# Patient Record
Sex: FEMALE | Race: WHITE | NOT HISPANIC OR LATINO | Employment: FULL TIME | URBAN - METROPOLITAN AREA
[De-identification: names, ages, dates, MRNs, and addresses within clinical notes are randomized per-mention and may not be internally consistent; named-entity substitution may affect disease eponyms.]

---

## 2017-02-13 ENCOUNTER — PATIENT MESSAGE (OUTPATIENT)
Dept: OBSTETRICS AND GYNECOLOGY | Facility: CLINIC | Age: 27
End: 2017-02-13

## 2017-02-13 RX ORDER — NORETHINDRONE ACETATE AND ETHINYL ESTRADIOL .02; 1 MG/1; MG/1
1 TABLET ORAL DAILY
Qty: 84 TABLET | Refills: 1 | Status: SHIPPED | OUTPATIENT
Start: 2017-02-13 | End: 2017-05-09 | Stop reason: SDUPTHER

## 2017-04-25 ENCOUNTER — TELEPHONE (OUTPATIENT)
Dept: OPTOMETRY | Facility: CLINIC | Age: 27
End: 2017-04-25

## 2017-04-25 NOTE — TELEPHONE ENCOUNTER
Called to confirmed  their appointment for tomorrow at [facility] location, asked pt to bring a copy of their current ctl rx

## 2017-04-26 ENCOUNTER — OFFICE VISIT (OUTPATIENT)
Dept: OPTOMETRY | Facility: CLINIC | Age: 27
End: 2017-04-26
Payer: COMMERCIAL

## 2017-04-26 DIAGNOSIS — H52.13 MYOPIA, BILATERAL: ICD-10-CM

## 2017-04-26 DIAGNOSIS — H52.13 MYOPIA, BILATERAL: Primary | ICD-10-CM

## 2017-04-26 DIAGNOSIS — Z01.00 ROUTINE EYE EXAM: Primary | ICD-10-CM

## 2017-04-26 PROCEDURE — 92310 CONTACT LENS FITTING OU: CPT | Mod: ,,, | Performed by: OPTOMETRIST

## 2017-04-26 PROCEDURE — 92004 COMPRE OPH EXAM NEW PT 1/>: CPT | Mod: S$GLB,,, | Performed by: OPTOMETRIST

## 2017-04-26 PROCEDURE — 99499 UNLISTED E&M SERVICE: CPT | Mod: S$GLB,,, | Performed by: OPTOMETRIST

## 2017-04-26 PROCEDURE — 99999 PR PBB SHADOW E&M-EST. PATIENT-LVL II: CPT | Mod: PBBFAC,,, | Performed by: OPTOMETRIST

## 2017-04-26 PROCEDURE — 92015 DETERMINE REFRACTIVE STATE: CPT | Mod: S$GLB,,, | Performed by: OPTOMETRIST

## 2017-04-26 NOTE — PROGRESS NOTES
HPI     Patient's last dilated exam was: 1 1/2 years ago  Pt states: here for updated ctl and glasses rx. Patient denies flashes,   floaters, pain and double vision.   No other ocular complaints        Last edited by BOBBY Ojeda on 4/26/2017 10:34 AM.     ROS     Negative for: Constitutional, Gastrointestinal, Neurological, Skin,   Genitourinary, Musculoskeletal, HENT, Endocrine, Cardiovascular, Eyes,   Respiratory, Psychiatric, Allergic/Imm, Heme/Lymph    Last edited by Yola Siddiqui, OD on 4/26/2017  1:19 PM. (History)        Assessment /Plan     For exam results, see Encounter Report.    Routine eye exam    Myopia, bilateral            1-2.  Contact lens trials ordered.  Continue with current glasses.  Eye health normal OU.  RTC 1 week for cl dispense.

## 2017-05-02 ENCOUNTER — OFFICE VISIT (OUTPATIENT)
Dept: OPTOMETRY | Facility: CLINIC | Age: 27
End: 2017-05-02
Payer: COMMERCIAL

## 2017-05-02 DIAGNOSIS — H52.13 MYOPIA, BILATERAL: Primary | ICD-10-CM

## 2017-05-02 PROCEDURE — 92499 UNLISTED OPH SVC/PROCEDURE: CPT | Mod: ,,, | Performed by: OPTOMETRIST

## 2017-05-02 NOTE — PROGRESS NOTES
HPI     Contact Lens Follow Up    Additional comments: CL pickup           Comments   Last eye exam was 4/26/17 with Dr. Siddiqui.         Last edited by Phyllis Lima on 5/2/2017  3:54 PM. (History)        ROS     Negative for: Constitutional, Gastrointestinal, Neurological, Skin,   Genitourinary, Musculoskeletal, HENT, Endocrine, Cardiovascular, Eyes,   Respiratory, Psychiatric, Allergic/Imm, Heme/Lymph    Last edited by Yola Siddiqui, OD on 5/2/2017  4:25 PM. (History)        Assessment /Plan     For exam results, see Encounter Report.    Myopia, bilateral            1.  Biofinity and Oasys trials dispensed.  Ok for patient to call in preference to finalize contact lens rx.  RTC 1 year for routine exam.

## 2017-05-09 RX ORDER — NORETHINDRONE ACETATE AND ETHINYL ESTRADIOL .02; 1 MG/1; MG/1
1 TABLET ORAL DAILY
Qty: 84 TABLET | Refills: 4 | Status: SHIPPED | OUTPATIENT
Start: 2017-05-09 | End: 2017-11-15 | Stop reason: SDUPTHER

## 2017-09-15 ENCOUNTER — PATIENT MESSAGE (OUTPATIENT)
Dept: OPTOMETRY | Facility: CLINIC | Age: 27
End: 2017-09-15

## 2017-09-18 ENCOUNTER — PATIENT MESSAGE (OUTPATIENT)
Dept: OPTOMETRY | Facility: CLINIC | Age: 27
End: 2017-09-18

## 2017-11-15 RX ORDER — NORETHINDRONE ACETATE AND ETHINYL ESTRADIOL .02; 1 MG/1; MG/1
1 TABLET ORAL DAILY
Qty: 84 TABLET | Refills: 0 | Status: SHIPPED | OUTPATIENT
Start: 2017-11-15 | End: 2018-01-16 | Stop reason: SDUPTHER

## 2018-01-16 ENCOUNTER — OFFICE VISIT (OUTPATIENT)
Dept: OBSTETRICS AND GYNECOLOGY | Facility: CLINIC | Age: 28
End: 2018-01-16
Attending: OBSTETRICS & GYNECOLOGY
Payer: COMMERCIAL

## 2018-01-16 VITALS
SYSTOLIC BLOOD PRESSURE: 128 MMHG | BODY MASS INDEX: 27.1 KG/M2 | DIASTOLIC BLOOD PRESSURE: 80 MMHG | HEIGHT: 64 IN | WEIGHT: 158.75 LBS

## 2018-01-16 DIAGNOSIS — Z12.4 PAP SMEAR FOR CERVICAL CANCER SCREENING: ICD-10-CM

## 2018-01-16 DIAGNOSIS — Z30.41 ENCOUNTER FOR SURVEILLANCE OF CONTRACEPTIVE PILLS: ICD-10-CM

## 2018-01-16 DIAGNOSIS — Z01.419 ENCOUNTER FOR GYNECOLOGICAL EXAMINATION WITHOUT ABNORMAL FINDING: Primary | ICD-10-CM

## 2018-01-16 PROCEDURE — 88175 CYTOPATH C/V AUTO FLUID REDO: CPT

## 2018-01-16 PROCEDURE — 99395 PREV VISIT EST AGE 18-39: CPT | Mod: S$GLB,,, | Performed by: OBSTETRICS & GYNECOLOGY

## 2018-01-16 PROCEDURE — 99999 PR PBB SHADOW E&M-EST. PATIENT-LVL III: CPT | Mod: PBBFAC,,, | Performed by: OBSTETRICS & GYNECOLOGY

## 2018-01-16 RX ORDER — NORETHINDRONE ACETATE AND ETHINYL ESTRADIOL .02; 1 MG/1; MG/1
1 TABLET ORAL DAILY
Qty: 84 TABLET | Refills: 3 | Status: SHIPPED | OUTPATIENT
Start: 2018-01-16 | End: 2019-01-09 | Stop reason: SDUPTHER

## 2018-01-16 NOTE — PROGRESS NOTES
Subjective:       Patient ID: Roxanne Hardy is a 27 y.o. female.    Chief Complaint:  Gynecologic Exam      History of Present Illness  HPI    Roxanne Hardy is a 27 y.o. female  NEW TO ME here for her annual GYN exam.    She describes her periods as regular, normal, light flow, lasting 4 days. (lighter since on OCP's)  denies break through bleeding.   denies vaginal itching or irritation.  Denies vaginal discharge.  She is sexually active. She has had 1 partner for the past 2 years .  She uses oral contraceptives (estrogen/progesterone) for contraception.   History of abnormal pap: No  Last Pap: approximate date  and was normal  denies domestic violence. She does feel safe at home.     Past Medical History:   Diagnosis Date    Bee sting allergy     Chronic idiopathic urticaria      History reviewed. No pertinent surgical history.  Social History     Social History    Marital status: Single     Spouse name: N/A    Number of children: N/A    Years of education: N/A     Occupational History    Not on file.     Social History Main Topics    Smoking status: Never Smoker    Smokeless tobacco: Never Used    Alcohol use 2.4 oz/week     4 Glasses of wine per week    Drug use: No    Sexual activity: Yes     Partners: Male     Birth control/ protection: OCP      Comment: Partner [id number]     Other Topics Concern    Not on file     Social History Narrative    No narrative on file     Family History   Problem Relation Age of Onset    Hyperlipidemia Mother     Hypothyroidism Mother     Hyperlipidemia Father     Hypertension Father     Ovarian cancer Maternal Grandmother 70    Skin cancer Paternal Grandmother     Diabetes Paternal Grandmother     Prostate cancer Paternal Grandfather     Pancreatic cancer Paternal Grandfather 83    Breast cancer Neg Hx     Colon cancer Neg Hx     Stroke Neg Hx      OB History      Para Term  AB Living    0 0 0 0 0 0    SAB TAB Ectopic Multiple Live  "Births    0 0 0 0            /80   Ht 5' 4" (1.626 m)   Wt 72 kg (158 lb 11.7 oz)   LMP 01/10/2018   BMI 27.25 kg/m²         GYN & OB History  Patient's last menstrual period was 01/10/2018.   Date of Last Pap: No result found    OB History    Para Term  AB Living   0 0 0 0 0 0   SAB TAB Ectopic Multiple Live Births   0 0 0 0               Review of Systems  Review of Systems   Constitutional: Negative for activity change, appetite change, fatigue and unexpected weight change.   HENT: Negative.    Eyes: Negative for visual disturbance.   Respiratory: Negative for shortness of breath and wheezing.    Cardiovascular: Negative for chest pain, palpitations and leg swelling.   Gastrointestinal: Negative for abdominal pain, bloating and blood in stool.   Endocrine: Negative for diabetes and hair loss.   Genitourinary: Negative for decreased libido, dyspareunia and menorrhagia.   Musculoskeletal: Negative for back pain and joint swelling.   Skin:  Negative for no acne and hair changes.   Neurological: Negative for headaches.   Hematological: Does not bruise/bleed easily.   Psychiatric/Behavioral: Negative for depression and sleep disturbance. The patient is not nervous/anxious.    Breast: Negative for breast pain and nipple discharge          Objective:    Physical Exam:   Constitutional: She is oriented to person, place, and time. She appears well-developed and well-nourished.    HENT:   Head: Normocephalic and atraumatic.    Eyes: EOM are normal. Pupils are equal, round, and reactive to light.    Neck: Normal range of motion. Neck supple.    Cardiovascular: Normal rate and regular rhythm.     Pulmonary/Chest: Effort normal and breath sounds normal.   BREASTS: Symmetrical, no skin changes or visible lesions.  No palpable masses, nipple discharge bilaterally.            Abdominal: Soft. Bowel sounds are normal.     Genitourinary: Pelvic exam was performed with patient supine.   Genitourinary " Comments: PELVIC: Normal external genitalia without lesions.  Normal hair distribution.  Adequate perineal body, normal urethral meatus.  Vagina moist and well rugated without lesions or discharge.  Cervix pink, with ectropion, without lesions, discharge or tenderness.  No significant cystocele or rectocele.  Bimanual exam shows uterus to be normal size, regular, mobile and nontender.  Adnexa without masses or tenderness.               Musculoskeletal: Normal range of motion and moves all extremeties.       Neurological: She is alert and oriented to person, place, and time.    Skin: Skin is warm and dry.    Psychiatric: She has a normal mood and affect.          Assessment:        1. Encounter for gynecological examination without abnormal finding    2. Pap smear for cervical cancer screening    3. Encounter for surveillance of contraceptive pills               Plan:        1. Encounter for gynecological examination without abnormal finding  COUNSELING:  The patient was counseled today on osteoporosis prevention, calcium supplementation, and regular weight bearing exercise. The patient was also counseled today on ACS PAP guidelines, with recommendations for yearly pelvic exams unless their uterus, cervix, and ovaries were removed for benign reasons; in that case, examinations every 3-5 years are recommended. The patient was also counseled regarding monthly breast self-examination, routine STD screening for at-risk populations, prophylactic immunizations for transmitted infections such as HPV, Pertussis, or Influenza as appropriate, and yearly mammograms when indicated by ACS guidelines. She was advised to see her primary care physician for all other health maintenance.   FOLLOW-UP with me for next routine visit.         2. Pap smear for cervical cancer screening    - Liquid-based pap smear, screening    3. Encounter for surveillance of contraceptive pills    - norethindrone-ethinyl estradiol (MICROGESTIN 1/20) 1-20  mg-mcg per tablet; Take 1 tablet by mouth once daily.  Dispense: 84 tablet; Refill: 3       Follow-up in about 1 year (around 1/16/2019).

## 2018-02-06 ENCOUNTER — TELEPHONE (OUTPATIENT)
Dept: OBSTETRICS AND GYNECOLOGY | Facility: CLINIC | Age: 28
End: 2018-02-06

## 2018-02-06 NOTE — TELEPHONE ENCOUNTER
Called to discuss pt's request for a refill of Microgestin 1/20. Pt already has a prescription that will last her through to 1/16/2019 with the [facility]. No answer. Left message requesting call back at [phone].

## 2018-03-26 ENCOUNTER — OFFICE VISIT (OUTPATIENT)
Dept: INTERNAL MEDICINE | Facility: CLINIC | Age: 28
End: 2018-03-26
Payer: COMMERCIAL

## 2018-03-26 VITALS
HEART RATE: 83 BPM | DIASTOLIC BLOOD PRESSURE: 70 MMHG | BODY MASS INDEX: 27.15 KG/M2 | HEIGHT: 65 IN | SYSTOLIC BLOOD PRESSURE: 100 MMHG | WEIGHT: 162.94 LBS | OXYGEN SATURATION: 99 %

## 2018-03-26 DIAGNOSIS — Z00.00 ANNUAL PHYSICAL EXAM: Primary | ICD-10-CM

## 2018-03-26 DIAGNOSIS — Z91.030 BEE STING ALLERGY: ICD-10-CM

## 2018-03-26 PROCEDURE — 99395 PREV VISIT EST AGE 18-39: CPT | Mod: S$GLB,,, | Performed by: NURSE PRACTITIONER

## 2018-03-26 PROCEDURE — 1158F ADVNC CARE PLAN TLK DOCD: CPT | Mod: S$GLB,,, | Performed by: NURSE PRACTITIONER

## 2018-03-26 PROCEDURE — 99999 PR PBB SHADOW E&M-EST. PATIENT-LVL III: CPT | Mod: PBBFAC,,, | Performed by: NURSE PRACTITIONER

## 2018-03-26 RX ORDER — EPINEPHRINE 0.3 MG/.3ML
1 INJECTION SUBCUTANEOUS ONCE AS NEEDED
Qty: 0.3 ML | Refills: 3 | Status: SHIPPED | OUTPATIENT
Start: 2018-03-26 | End: 2020-05-26 | Stop reason: SDUPTHER

## 2018-03-26 NOTE — PROGRESS NOTES
"Subjective:       Patient ID: Roxanne Hardy is a 27 y.o. female.    Chief Complaint: Annual Exam    Pt here for annual exam, no complaints  Gets annual eye exam, wears contacts and glasses  Works NICU RN at [employer]  Goes to dentist on regular basis  Attempts to exercise  Had flu vaccine through employee health this year  Had a tetanus prior to moving to  in 2011  Wears seatbelts  Feels safe at home      Past Medical History:   Diagnosis Date    Bee sting allergy     Chronic idiopathic urticaria      History reviewed. No pertinent surgical history.  Social History     Social History Narrative    No narrative on file     Family History   Problem Relation Age of Onset    Hyperlipidemia Mother     Hypothyroidism Mother     Hyperlipidemia Father     Hypertension Father     Ovarian cancer Maternal Grandmother 70    Skin cancer Paternal Grandmother     Diabetes Paternal Grandmother     Prostate cancer Paternal Grandfather     Pancreatic cancer Paternal Grandfather 83    Breast cancer Neg Hx     Colon cancer Neg Hx     Stroke Neg Hx      Vitals:    03/26/18 1433   BP: 100/70   Pulse: 83   SpO2: 99%   Weight: 73.9 kg (162 lb 14.7 oz)   Height: 5' 5" (1.651 m)   PainSc: 0-No pain     Outpatient Encounter Prescriptions as of 3/26/2018   Medication Sig Dispense Refill    norethindrone-ethinyl estradiol (MICROGESTIN 1/20) 1-20 mg-mcg per tablet Take 1 tablet by mouth once daily. 84 tablet 3    EPINEPHrine (EPIPEN 2-BUCK) 0.3 mg/0.3 mL AtIn Inject 0.3 mLs (0.3 mg total) into the muscle once as needed (anaphylaxis). 0.3 mL 3    [DISCONTINUED] epinephrine (EPIPEN 2-BUCK) 0.3 mg/0.3 mL (1:1,000) AtIn Inject 0.3 mLs (0.3 mg total) into the muscle once as needed (anaphylaxis). 0.3 mL 0     No facility-administered encounter medications on file as of 3/26/2018.      Wt Readings from Last 3 Encounters:   03/26/18 73.9 kg (162 lb 14.7 oz)   01/16/18 72 kg (158 lb 11.7 oz)   11/30/16 67.5 kg (148 lb 13 oz)     Last 3 sets " "of Vitals    Vitals - 1 value per visit 5/2/2017 1/16/2018 3/26/2018   SYSTOLIC - 128 100   DIASTOLIC - 80 70   PULSE - - 83   TEMPERATURE - - -   SPO2 - - 99   Weight (lb) - 158.73 162.92   Weight (kg) - 72 73.9   HEIGHT - 5' 4" 5' 5"   BODY MASS INDEX - 27.25 27.11   VISIT REPORT - - -   Pain Score  0 0 0     No results found for: CBC  Review of Systems   Constitutional: Negative for activity change, appetite change, chills and unexpected weight change.   HENT: Negative for congestion and trouble swallowing.    Respiratory: Negative for cough.    Cardiovascular: Negative for chest pain.   Gastrointestinal: Negative for abdominal pain, diarrhea and nausea.   Genitourinary: Negative for difficulty urinating.   Musculoskeletal: Negative for arthralgias and myalgias.   Skin: Negative for rash.   Hematological: Negative for adenopathy.   Psychiatric/Behavioral: Negative for sleep disturbance.       Objective:      Physical Exam   Constitutional: She is oriented to person, place, and time. She appears well-developed and well-nourished. No distress.   HENT:   Head: Normocephalic and atraumatic.   Right Ear: External ear normal.   Left Ear: External ear normal.   Nose: Nose normal.   Mouth/Throat: Oropharynx is clear and moist. No oropharyngeal exudate.   Eyes: Conjunctivae and EOM are normal. Pupils are equal, round, and reactive to light. Right eye exhibits no discharge. No scleral icterus.   Neck: Normal range of motion. Neck supple. No JVD present. No thyromegaly present.   Cardiovascular: Normal rate, regular rhythm, normal heart sounds and intact distal pulses.    No murmur heard.  Pulmonary/Chest: Effort normal and breath sounds normal. No respiratory distress. She has no wheezes.   Abdominal: Soft. Bowel sounds are normal. She exhibits no distension and no mass. There is no tenderness.   Musculoskeletal: Normal range of motion. She exhibits no edema or tenderness.   Lymphadenopathy:     She has no cervical " adenopathy.   Neurological: She is alert and oriented to person, place, and time. She has normal reflexes. No cranial nerve deficit. She exhibits normal muscle tone.   Skin: Skin is warm and dry. No rash noted. She is not diaphoretic. No erythema.   Psychiatric: She has a normal mood and affect. Her behavior is normal. Judgment and thought content normal.   Nursing note and vitals reviewed.      Assessment:       1. Annual physical exam    2. Bee sting allergy        Plan:       Patient Counseling:  --Nutrition: Stressed importance of moderation in sodium/caffeine intake, saturated fat and cholesterol, caloric balance, sufficient intake of fresh fruits, vegetables, fiber, calcium, iron, and 1 mg of folate supplement per day (for females capable of pregnancy).  --Exercise: Stressed the importance of regular exercise.   --Substance Abuse: Discussed cessation/primary prevention of tobacco, alcohol, or other drug use; driving or other dangerous activities under the influence; availability of treatment for abuse.   --Sexuality: Discussed sexually transmitted diseases, partner selection, use of condoms, avoidance of unintended pregnancy and contraceptive alternatives.   --Injury prevention: Discussed safety belts, safety helmets, smoke detector.  --Dental health: Discussed importance of regular tooth brushing, flossing, and dental visits.  --Immunizations reviewed.  Roxanne was seen today for annual exam.    Diagnoses and all orders for this visit:    Annual physical exam  -     CBC auto differential; Future  -     Comprehensive metabolic panel; Future  -     Lipid panel; Future    Bee sting allergy  -     EPINEPHrine (EPIPEN 2-BUCK) 0.3 mg/0.3 mL AtIn; Inject 0.3 mLs (0.3 mg total) into the muscle once as needed (anaphylaxis).      Patient Instructions       Prevention Guidelines, Women Ages 18 to 39  Screening tests and vaccines are an important part of managing your health. Health counseling is essential, too. Below are  guidelines for these, for women ages 18 to 39. Talk with your healthcare provider to make sure youre up-to-date on what you need.  Screening Who needs it How often   Alcohol misuse All women in this age group At routine exams   Blood pressure All women in this age group Every 2 years if your blood pressure is less than 120/80 mm Hg; yearly if your systolic blood pressure is 120 to 139 mm Hg, or your diastolic blood pressure reading is 80 to 89 mm Hg   Breast cancer All women in this age group should talk with their healthcare providers about the need for clinical breast exams (CBE)1 Clinical breast exam every 3 years1   Cervical cancer Women ages 21 and older Women between ages 21 and 29 should have a Pap test every 3 years; women between ages 30 and 65 are advised to have a Pap test plus an HPV test every 5 years   Chlamydia Sexually active women ages 24 and younger, and women at increased risk for infection Every 3 years if you're at risk or have symptoms   Depression All women in this age group At routine exams   Diabetes mellitus, type 2 Adults with no symptoms who are overweight or obese and have 1 or more other risk factors for diabetes At least every 3 years   Gonorrhea Sexually active women at increased risk for infection At routine exams   Hepatitis C Anyone at increased risk At routine exams   HIV All women At routine exams   Obesity All women in this age group At routine exams   Syphilis Women at increased risk for infection - talk with your healthcare provider At routine exams   Tuberculosis Women at increased risk for infection - talk with your healthcare provider Ask your healthcare provider   Vision All women in this age group At least 1 complete exam in your 20s, and 2 in your 30s   Vaccine2 Who needs it How often   Chickenpox (varicella) All women in this age group who have no record of this infection or vaccine 2 doses; the second dose should be given 4 to 8 weeks after the first dose   Hepatitis  A Women at increased risk for infection - talk with your healthcare provider 2 doses given at least 6 months apart   Hepatitis B Women at increased risk for infection - talk with your healthcare provider 3 doses over 6 months; second dose should be given 1 month after the first dose; the third dose should be given at least 2 months after the second dose and at least 4 months after the first dose   Haemophilus influenzae Type B (HIB) Women at increased risk for infection - talk with your healthcare provider 1 to 3 doses   Human papillomavirus (HPV) All women in this age group up to age 26 3 doses; the second dose should be given 1 to 2 months after the first dose and the third dose given 6 months after the first dose   Influenza (flu) All women in this age group Once a year   Measles, mumps, rubella (MMR) All women in this age group who have no record of these infections or vaccines 1 or 2 doses   Meningococcal Women at increased risk for infection - talk with your healthcare provider 1 or more doses   Pneumococcal conjugate vaccine (PCV13) and pneumococcal polysaccharide vaccine (PPSV23) Women at increased risk for infection - talk with your healthcare provider PCV13: 1 dose ages 19 to 65 (protects against 13 types of pneumococcal bacteria)  PPSV23: 1 to 2 doses through age 64, or 1 dose at 65 or older (protects against 23 types of pneumococcal bacteria)      Tetanus/diphtheria/pertussis (Td/Tdap) booster All women in this age group Td every 10 years, or a one-time dose of Tdap instead of a Td booster after age 18, then Td every 10 years   Counseling Who needs it How often   BRCA gene mutation testing for breast and ovarian cancer susceptibility Women with increased risk for having gene mutation When your risk is known   Breast cancer and chemoprevention Women at high risk for breast cancer When your risk is known   Diet and exercise Women who are overweight or obese When diagnosed, and then at routine exams    Domestic violence Women at the age in which they are able to have children At routine exams   Sexually transmitted infection prevention Women who are sexually active At routine exams   Skin cancer Prevention of skin cancer in fair-skinned adults through age 24 At routine exams   Use of tobacco and the health effects it can cause All women in this age group Every visit   1According to the ACS, women ages 20 to 39 years should have a clinical breast exam (CBE) as part of their routine health exam every 3 years. Breast self-exams are an option for women starting in their 20s. But the  USPSTF does not recommend CBE.  2Those who are 18 years old and not up-to-date on their childhood vaccines should get all appropriate catch-up vaccines recommended by the CDC.  Date Last Reviewed: 8/27/2015  © 9283-6163 [employer]. [address] [place] [address], [place] [address]. All rights reserved. This information is not intended as a substitute for professional medical care. Always follow your healthcare professional's instructions.

## 2018-03-26 NOTE — PATIENT INSTRUCTIONS
Prevention Guidelines, Women Ages 18 to 39  Screening tests and vaccines are an important part of managing your health. Health counseling is essential, too. Below are guidelines for these, for women ages 18 to 39. Talk with your healthcare provider to make sure youre up-to-date on what you need.  Screening Who needs it How often   Alcohol misuse All women in this age group At routine exams   Blood pressure All women in this age group Every 2 years if your blood pressure is less than 120/80 mm Hg; yearly if your systolic blood pressure is 120 to 139 mm Hg, or your diastolic blood pressure reading is 80 to 89 mm Hg   Breast cancer All women in this age group should talk with their healthcare providers about the need for clinical breast exams (CBE)1 Clinical breast exam every 3 years1   Cervical cancer Women ages 21 and older Women between ages 21 and 29 should have a Pap test every 3 years; women between ages 30 and 65 are advised to have a Pap test plus an HPV test every 5 years   Chlamydia Sexually active women ages 24 and younger, and women at increased risk for infection Every 3 years if you're at risk or have symptoms   Depression All women in this age group At routine exams   Diabetes mellitus, type 2 Adults with no symptoms who are overweight or obese and have 1 or more other risk factors for diabetes At least every 3 years   Gonorrhea Sexually active women at increased risk for infection At routine exams   Hepatitis C Anyone at increased risk At routine exams   HIV All women At routine exams   Obesity All women in this age group At routine exams   Syphilis Women at increased risk for infection - talk with your healthcare provider At routine exams   Tuberculosis Women at increased risk for infection - talk with your healthcare provider Ask your healthcare provider   Vision All women in this age group At least 1 complete exam in your 20s, and 2 in your 30s   Vaccine2 Who needs it How often   Chickenpox  (varicella) All women in this age group who have no record of this infection or vaccine 2 doses; the second dose should be given 4 to 8 weeks after the first dose   Hepatitis A Women at increased risk for infection - talk with your healthcare provider 2 doses given at least 6 months apart   Hepatitis B Women at increased risk for infection - talk with your healthcare provider 3 doses over 6 months; second dose should be given 1 month after the first dose; the third dose should be given at least 2 months after the second dose and at least 4 months after the first dose   Haemophilus influenzae Type B (HIB) Women at increased risk for infection - talk with your healthcare provider 1 to 3 doses   Human papillomavirus (HPV) All women in this age group up to age 26 3 doses; the second dose should be given 1 to 2 months after the first dose and the third dose given 6 months after the first dose   Influenza (flu) All women in this age group Once a year   Measles, mumps, rubella (MMR) All women in this age group who have no record of these infections or vaccines 1 or 2 doses   Meningococcal Women at increased risk for infection - talk with your healthcare provider 1 or more doses   Pneumococcal conjugate vaccine (PCV13) and pneumococcal polysaccharide vaccine (PPSV23) Women at increased risk for infection - talk with your healthcare provider PCV13: 1 dose ages 19 to 65 (protects against 13 types of pneumococcal bacteria)  PPSV23: 1 to 2 doses through age 64, or 1 dose at 65 or older (protects against 23 types of pneumococcal bacteria)      Tetanus/diphtheria/pertussis (Td/Tdap) booster All women in this age group Td every 10 years, or a one-time dose of Tdap instead of a Td booster after age 18, then Td every 10 years   Counseling Who needs it How often   BRCA gene mutation testing for breast and ovarian cancer susceptibility Women with increased risk for having gene mutation When your risk is known   Breast cancer and  chemoprevention Women at high risk for breast cancer When your risk is known   Diet and exercise Women who are overweight or obese When diagnosed, and then at routine exams   Domestic violence Women at the age in which they are able to have children At routine exams   Sexually transmitted infection prevention Women who are sexually active At routine exams   Skin cancer Prevention of skin cancer in fair-skinned adults through age 24 At routine exams   Use of tobacco and the health effects it can cause All women in this age group Every visit   1According to the ACS, women ages 20 to 39 years should have a clinical breast exam (CBE) as part of their routine health exam every 3 years. Breast self-exams are an option for women starting in their 20s. But the  USPSTF does not recommend CBE.  2Those who are 18 years old and not up-to-date on their childhood vaccines should get all appropriate catch-up vaccines recommended by the CDC.  Date Last Reviewed: 8/27/2015  © 0469-5252 [employer]. [address] [place] [address], [place] [address]. All rights reserved. This information is not intended as a substitute for professional medical care. Always follow your healthcare professional's instructions.

## 2018-05-15 ENCOUNTER — PATIENT MESSAGE (OUTPATIENT)
Dept: INTERNAL MEDICINE | Facility: CLINIC | Age: 28
End: 2018-05-15

## 2018-06-18 ENCOUNTER — TELEPHONE (OUTPATIENT)
Dept: PLASTIC SURGERY | Facility: CLINIC | Age: 28
End: 2018-06-18

## 2018-07-11 ENCOUNTER — OFFICE VISIT (OUTPATIENT)
Dept: PLASTIC SURGERY | Facility: CLINIC | Age: 28
End: 2018-07-11
Payer: COMMERCIAL

## 2018-07-11 VITALS
HEIGHT: 65 IN | RESPIRATION RATE: 18 BRPM | HEART RATE: 110 BPM | TEMPERATURE: 99 F | DIASTOLIC BLOOD PRESSURE: 86 MMHG | SYSTOLIC BLOOD PRESSURE: 121 MMHG | BODY MASS INDEX: 28.17 KG/M2 | WEIGHT: 169.06 LBS

## 2018-07-11 DIAGNOSIS — G89.29 CHRONIC NECK PAIN: ICD-10-CM

## 2018-07-11 DIAGNOSIS — G89.29 CHRONIC BILATERAL LOW BACK PAIN WITHOUT SCIATICA: ICD-10-CM

## 2018-07-11 DIAGNOSIS — R21 EXCORIATED RASH: ICD-10-CM

## 2018-07-11 DIAGNOSIS — M54.2 CHRONIC NECK PAIN: ICD-10-CM

## 2018-07-11 DIAGNOSIS — M54.50 CHRONIC BILATERAL LOW BACK PAIN WITHOUT SCIATICA: ICD-10-CM

## 2018-07-11 DIAGNOSIS — N62 MACROMASTIA: Primary | ICD-10-CM

## 2018-07-11 PROCEDURE — 99999 PR PBB SHADOW E&M-EST. PATIENT-LVL III: CPT | Mod: PBBFAC,,, | Performed by: SURGERY

## 2018-07-11 PROCEDURE — 3008F BODY MASS INDEX DOCD: CPT | Mod: CPTII,S$GLB,, | Performed by: SURGERY

## 2018-07-11 PROCEDURE — 99204 OFFICE O/P NEW MOD 45 MIN: CPT | Mod: S$GLB,,, | Performed by: SURGERY

## 2018-07-11 NOTE — LETTER
[facility]  [address]  [facility] [address]  Phone: [phone]  Fax: [phone] July 19, 2018      Vinny Benton IV, MD  [address]  [facility] [address]    Patient: Roxanne Hardy   MR Number: [id number]   YOB: 1990   Date of Visit: 7/11/2018     Dear Dr. Benton:    Thank you for referring Roxanne Hardy to me for evaluation. Below are the relevant portions of my assessment and plan of care.    ASSESSMENT/PLAN:      1.Symptomatic Bilateral Macromastia  2. Chronic neck pain  3. Chronic back pain  4. Chronic breast rashes     PLAN:   -Will need 1200 gm reduction per side  -Will submit paper work for insurance approval  -Photos ordered  -Risk, benefits, and alternatives explained.    If you have questions, please do not hesitate to call me. I look forward to following Roxanne along with you.    Sincerely,    Asa Mak M.D.  Section of Plastic Surgery  [facility]    MAY/juice

## 2018-07-11 NOTE — LETTER
July 12, 2018      Vinny Benton IV, MD  [address]  [facility] [address]           [facility]  [address]  [facility] [address]  Phone: [phone]  Fax: [phone]          Patient: Roxanne Hardy   MR Number: [id number]   YOB: 1990   Date of Visit: 7/11/2018       Dear Dr. Vinny Benton IV:    Thank you for referring Roxanne Hardy to me for evaluation. Attached you will find relevant portions of my assessment and plan of care.    If you have questions, please do not hesitate to call me. I look forward to following Roxanne Hardy along with you.    Sincerely,    Asa Mak MD    Enclosure  CC:  No Recipients    If you would like to receive this communication electronically, please contact [email or web address] or [phone] to request more information on [employer] Link access.    For providers and/or their staff who would like to refer a patient to [facility], please contact us through our one-stop-shop provider referral line, [facility], at [phone].    If you feel you have received this communication in error or would no longer like to receive these types of communications, please e-mail [email or web address]

## 2018-08-03 DIAGNOSIS — M54.5 CHRONIC LOW BACK PAIN, UNSPECIFIED BACK PAIN LATERALITY, WITH SCIATICA PRESENCE UNSPECIFIED: ICD-10-CM

## 2018-08-03 DIAGNOSIS — M54.5 LOW BACK PAIN, UNSPECIFIED BACK PAIN LATERALITY, UNSPECIFIED CHRONICITY, WITH SCIATICA PRESENCE UNSPECIFIED: ICD-10-CM

## 2018-08-03 DIAGNOSIS — R21 EXCORIATED RASH: ICD-10-CM

## 2018-08-03 DIAGNOSIS — M54.2 NECK PAIN: ICD-10-CM

## 2018-08-03 DIAGNOSIS — N62 MACROMASTIA: Primary | ICD-10-CM

## 2018-08-03 DIAGNOSIS — G89.29 CHRONIC LOW BACK PAIN, UNSPECIFIED BACK PAIN LATERALITY, WITH SCIATICA PRESENCE UNSPECIFIED: ICD-10-CM

## 2018-08-29 ENCOUNTER — TELEPHONE (OUTPATIENT)
Dept: PLASTIC SURGERY | Facility: CLINIC | Age: 28
End: 2018-08-29

## 2018-08-29 NOTE — TELEPHONE ENCOUNTER
----- Message from Dipesh Dhillon sent at 8/29/2018  1:38 PM CDT -----  Pt called stating she need a letter stating that her surgery date has been rescheduled.    She says it has to state the original surgery date and the date its been reschedule to.    Call [phone]

## 2018-08-30 ENCOUNTER — PATIENT MESSAGE (OUTPATIENT)
Dept: SURGERY | Facility: CLINIC | Age: 28
End: 2018-08-30

## 2018-09-18 ENCOUNTER — ANESTHESIA EVENT (OUTPATIENT)
Dept: SURGERY | Facility: HOSPITAL | Age: 28
End: 2018-09-18
Payer: COMMERCIAL

## 2018-10-01 ENCOUNTER — OFFICE VISIT (OUTPATIENT)
Dept: DERMATOLOGY | Facility: CLINIC | Age: 28
End: 2018-10-01
Payer: COMMERCIAL

## 2018-10-01 DIAGNOSIS — D48.5 NEOPLASM OF UNCERTAIN BEHAVIOR OF SKIN: Primary | ICD-10-CM

## 2018-10-01 PROCEDURE — 11100 PR BIOPSY OF SKIN LESION: CPT | Mod: S$GLB,,, | Performed by: DERMATOLOGY

## 2018-10-01 PROCEDURE — 88304 TISSUE EXAM BY PATHOLOGIST: CPT | Performed by: PATHOLOGY

## 2018-10-01 PROCEDURE — 99499 UNLISTED E&M SERVICE: CPT | Mod: S$GLB,,, | Performed by: DERMATOLOGY

## 2018-10-01 PROCEDURE — 99999 PR PBB SHADOW E&M-EST. PATIENT-LVL III: CPT | Mod: PBBFAC,,, | Performed by: DERMATOLOGY

## 2018-10-01 PROCEDURE — 88304 TISSUE EXAM BY PATHOLOGIST: CPT | Mod: 26,,, | Performed by: PATHOLOGY

## 2018-10-01 NOTE — PROGRESS NOTES
Subjective:       Patient ID:  Roxanne Hardy is a 28 y.o. female who presents for   Chief Complaint   Patient presents with    Lesion     Pt c/o lesion on right cheek x a few years. Increased in size. No bleeding, pain . Was bx over 2 years ago and was a cyst but lesion has recurred.       Lesion         Review of Systems   Skin: Negative for tendency to form keloidal scars.   Hematologic/Lymphatic: Does not bruise/bleed easily.        Objective:    Physical Exam   Constitutional: She appears well-developed and well-nourished. No distress.   Neurological: She is alert and oriented to person, place, and time. She is not disoriented.   Psychiatric: She has a normal mood and affect.   Skin:   Areas Examined (abnormalities noted in diagram):   Head / Face Inspection Performed              Diagram Legend     Erythematous scaling macule/papule c/w actinic keratosis       Vascular papule c/w angioma      Pigmented verrucoid papule/plaque c/w seborrheic keratosis      Yellow umbilicated papule c/w sebaceous hyperplasia      Irregularly shaped tan macule c/w lentigo     1-2 mm smooth white papules consistent with Milia      Movable subcutaneous cyst with punctum c/w epidermal inclusion cyst      Subcutaneous movable cyst c/w pilar cyst      Firm pink to brown papule c/w dermatofibroma      Pedunculated fleshy papule(s) c/w skin tag(s)      Evenly pigmented macule c/w junctional nevus     Mildly variegated pigmented, slightly irregular-bordered macule c/w mildly atypical nevus      Flesh colored to evenly pigmented papule c/w intradermal nevus       Pink pearly papule/plaque c/w basal cell carcinoma      Erythematous hyperkeratotic cursted plaque c/w SCC      Surgical scar with no sign of skin cancer recurrence      Open and closed comedones      Inflammatory papules and pustules      Verrucoid papule consistent consistent with wart     Erythematous eczematous patches and plaques     Dystrophic onycholytic nail with subungual  debris c/w onychomycosis     Umbilicated papule    Erythematous-base heme-crusted tan verrucoid plaque consistent with inflamed seborrheic keratosis     Erythematous Silvery Scaling Plaque c/w Psoriasis     See annotation      Assessment / Plan:      Pathology Orders:     Normal Orders This Visit    Tissue Specimen To Pathology, Dermatology     Questions:    Directional Terms:  Other(comment)    Clinical information:  r/o cyst    Specific Site:  right lateral cheek        Neoplasm of uncertain behavior of skin  Punch biopsy procedure note:  Punch biopsy performed after verbal consent obtained. Area marked and prepped with alcohol. Approximately 1cc of 1% lidocaine with epinephrine injected. 6 mm disposable punch used to remove lesion. Hemostasis obtained and biopsy site closed with 1 - 2 Prolene sutures. Wound care instructions reviewed with patient and handout given.    -     Tissue Specimen To Pathology, Dermatology             Follow-up in about 1 week (around 10/8/2018).

## 2018-10-08 ENCOUNTER — CLINICAL SUPPORT (OUTPATIENT)
Dept: DERMATOLOGY | Facility: CLINIC | Age: 28
End: 2018-10-08
Payer: COMMERCIAL

## 2018-10-08 DIAGNOSIS — Z48.02 VISIT FOR SUTURE REMOVAL: Primary | ICD-10-CM

## 2018-10-08 PROCEDURE — 99999 PR PBB SHADOW E&M-EST. PATIENT-LVL I: CPT | Mod: PBBFAC,,,

## 2018-10-08 NOTE — PROGRESS NOTES
Patient presents for suture removal. The wound is well healed without signs of infection.  The sutures are removed. Wound care and activity instructions given. Return prn.

## 2018-10-11 ENCOUNTER — TELEPHONE (OUTPATIENT)
Dept: PLASTIC SURGERY | Facility: CLINIC | Age: 28
End: 2018-10-11

## 2018-10-11 NOTE — TELEPHONE ENCOUNTER
Pt came in for pre op appointment.  Her allergies and medication list was reviewed. I verified with her that a ride needed to be arranged and where to report on day of surgery.  We went over education of pre op/post op instruction as well as LEFTY drain use and function.  All her questions were answered and contact information of the PLS office was given to her should new questions arise.

## 2018-10-19 ENCOUNTER — TELEPHONE (OUTPATIENT)
Dept: PREADMISSION TESTING | Facility: HOSPITAL | Age: 28
End: 2018-10-19

## 2018-10-19 DIAGNOSIS — Z01.818 PREOPERATIVE TESTING: Primary | ICD-10-CM

## 2018-10-19 NOTE — PRE ADMISSION SCREENING
Anesthesia Assessment: Preoperative EQUATION    Planned Procedure: Procedure(s) (LRB):  MAMMOPLASTY, REDUCTION, BILATERAL (Bilateral)  Requested Anesthesia Type:General  Surgeon: Asa Mak MD  Service: Plastics  Known or anticipated Date of Surgery:11/1/2018    Surgeon notes: reviewed    Electronic QUestionnaire Assessment completed via nurse interview with patient.        NO AQ        Triage considerations:     The patient has no apparent active cardiac condition (No unstable coronary Syndrome such as severe unstable angina or recent [<1 month] myocardial infarction, decompensated CHF, severe valvular   disease or significant arrhythmia)    Previous anesthesia records:None and (Pt has only had wisdom teeth removed)    Last PCP note: 6-12 months ago , within [facility]   Subspecialty notes: None    Other important co-morbidities: None     Tests already available:  Available tests,  6-12 months ago , within [facility] .  3/2018 Lipid, CMP, CBC            Instructions given. (See in Nurse's note)    Optimization:  Anesthesia Preop Clinic Assessment  Indicated: Not required for this procedure          Plan:    Testing:  Hemoglobin     Patient  has previously scheduled Medical Appointment: Not at this time    Navigation: Tests Scheduled.              Results will be tracked by Preop Clinic.

## 2018-10-19 NOTE — TELEPHONE ENCOUNTER
----- Message from Devi Reis RN sent at 10/19/2018 10:43 AM CDT -----  Please call Pt and schedule the following appts:    Lab      Thank you!  Devi

## 2018-10-19 NOTE — ANESTHESIA PREPROCEDURE EVALUATION
[facility]  Anesthesia Pre-Operative Evaluation         Patient Name: Roxanne Hardy  YOB: 1990  MRN: [id number]    SUBJECTIVE:     Pre-operative evaluation for Procedure(s) (LRB):  MAMMOPLASTY, REDUCTION, BILATERAL (Bilateral)     10/31/2018    Roxanne Hardy is a 28 y.o. female w/ a significant PMHx of chronic idiopathic urticaria and macromastia who now presents for the above procedure. .      LDA: None documented.    Prev airway: None documented.    Drips: None documented.    Patient Active Problem List   Diagnosis    Chronic idiopathic urticaria    Bee sting allergy       Review of patient's allergies indicates:   Allergen Reactions    Bee sting [allergen ext-venom-honey bee] Anaphylaxis       Current Outpatient Medications:  No current facility-administered medications for this encounter.     Current Outpatient Medications:     norethindrone-ethinyl estradiol (MICROGESTIN 1/20) 1-20 mg-mcg per tablet, Take 1 tablet by mouth once daily., Disp: 84 tablet, Rfl: 3    norethindrone-ethinyl estradiol (MICROGESTIN FE 1/20, 28,) 1 mg-20 mcg (21)/75 mg (7) per tablet, take 1 tablet by mouth once daily, Disp: 84 tablet, Rfl: 3    EPINEPHrine (EPIPEN 2-BUCK) 0.3 mg/0.3 mL AtIn, Inject 0.3 mLs (0.3 mg total) into the muscle once as needed (anaphylaxis)., Disp: 0.3 mL, Rfl: 3    No past surgical history on file.    Social History     Socioeconomic History    Marital status: Single     Spouse name: Not on file    Number of children: Not on file    Years of education: Not on file    Highest education level: Not on file   Social Needs    Financial resource strain: Not on file    Food insecurity - worry: Not on file    Food insecurity - inability: Not on file    Transportation needs - medical: Not on file    Transportation needs - non-medical: Not on file   Occupational History    Not on file   Tobacco Use    Smoking status: Never Smoker    Smokeless tobacco: Never Used   Substance  and Sexual Activity    Alcohol use: Yes     Alcohol/week: 2.4 oz     Types: 4 Glasses of wine per week    Drug use: No    Sexual activity: Yes     Partners: Male     Birth control/protection: OCP     Comment: Partner [email or web address]   Other Topics Concern    Not on file   Social History Narrative    Not on file       OBJECTIVE:     Vital Signs Range (Last 24H):         Significant Labs:  Lab Results   Component Value Date    WBC 6.70 03/26/2018    HGB 13.6 10/22/2018    HCT 40.5 03/26/2018     03/26/2018    CHOL 148 03/26/2018    TRIG 83 03/26/2018    HDL 69 03/26/2018    ALT 13 03/26/2018    AST 19 03/26/2018     03/26/2018    K 3.8 03/26/2018     03/26/2018    CREATININE 0.7 03/26/2018    BUN 11 03/26/2018    CO2 27 03/26/2018       Diagnostic Studies: No relevant studies.    EKG: No recent studies available.      ASSESSMENT/PLAN:       Anesthesia Evaluation         Review of Systems  Anesthesia Hx:  No previous Anesthesia Family Hx of Anesthesia complications:    Social:  Non-Smoker, Social Alcohol Use    Hematology/Oncology:  Hematology Normal   Oncology Normal     EENT/Dental:EENT/Dental Normal   Cardiovascular:    Denies Angina.  Functional Capacity good / => 4 METS    Pulmonary:   Denies Asthma.  Denies Shortness of breath.  Denies Recent URI.    Renal/:  Renal/ Normal     Hepatic/GI:  Hepatic/GI Normal    Musculoskeletal:  Musculoskeletal General/Symptoms: low back pain, neck pain. Macromastia   Neurological:  Neurology Normal    Endocrine:  Endocrine Normal    Dermatological:   Excoriated rash secondary to Macromastia   Psych:  Psychiatric Normal           Physical Exam  General:  Well nourished    Airway/Jaw/Neck:  Airway Findings: Mouth Opening: Small, but > 3cm Tongue: Normal  General Airway Assessment: Adult  Mallampati: III  Improves to II with phonation.  TM Distance: Normal, at least 6 cm  Jaw/Neck Findings:  Neck ROM: Normal ROM     Eyes/Ears/Nose:  Eyes/Ears/Nose Findings:  EOMI, mucus membranes moist    Dental:  Dental Findings: In tact   Chest/Lungs:  Chest/Lungs Findings: Clear to auscultation, Normal Respiratory Rate     Heart/Vascular:  Heart Findings: Rate: Normal  Rhythm: Regular Rhythm  Heart murmur: negative    Abdomen:  Abdomen Findings: Normal    Musculoskeletal:  Musculoskeletal Findings: Normal   Skin:  Skin Findings: Normal    Mental Status:  Mental Status Findings:  Cooperative, Alert and Oriented         Anesthesia Plan  Type of Anesthesia, risks & benefits discussed:  Anesthesia Type:  general  Patient's Preference:   Intra-op Monitoring Plan: standard ASA monitors  Intra-op Monitoring Plan Comments:   Post Op Pain Control Plan: multimodal analgesia, IV/PO Opioids PRN and per primary service following discharge from PACU  Post Op Pain Control Plan Comments:   Induction:   IV  Beta Blocker:  Patient is not currently on a Beta-Blocker (No further documentation required).       Informed Consent: Patient understands risks and agrees with Anesthesia plan.  Questions answered. Anesthesia consent signed with patient.  ASA Score: 1     Day of Surgery Review of History & Physical:    H&P update referred to the surgeon.         Ready For Surgery From Anesthesia Perspective.

## 2018-10-22 ENCOUNTER — LAB VISIT (OUTPATIENT)
Dept: LAB | Facility: OTHER | Age: 28
End: 2018-10-22
Payer: COMMERCIAL

## 2018-10-22 DIAGNOSIS — Z01.818 PREOPERATIVE TESTING: ICD-10-CM

## 2018-10-22 LAB — HGB BLD-MCNC: 13.6 G/DL

## 2018-10-22 PROCEDURE — 85018 HEMOGLOBIN: CPT

## 2018-10-22 PROCEDURE — 36415 COLL VENOUS BLD VENIPUNCTURE: CPT

## 2018-10-31 PROBLEM — N62 MACROMASTIA: Status: ACTIVE | Noted: 2018-10-31

## 2018-11-01 ENCOUNTER — ANESTHESIA (OUTPATIENT)
Dept: SURGERY | Facility: HOSPITAL | Age: 28
End: 2018-11-01
Payer: COMMERCIAL

## 2018-11-01 ENCOUNTER — HOSPITAL ENCOUNTER (OUTPATIENT)
Facility: HOSPITAL | Age: 28
Discharge: HOME | End: 2018-11-01
Attending: SURGERY | Admitting: SURGERY
Payer: COMMERCIAL

## 2018-11-01 VITALS
DIASTOLIC BLOOD PRESSURE: 81 MMHG | OXYGEN SATURATION: 99 % | TEMPERATURE: 98 F | BODY MASS INDEX: 26.52 KG/M2 | RESPIRATION RATE: 20 BRPM | SYSTOLIC BLOOD PRESSURE: 119 MMHG | WEIGHT: 165 LBS | HEART RATE: 94 BPM | HEIGHT: 66 IN

## 2018-11-01 DIAGNOSIS — N62 MACROMASTIA: Primary | ICD-10-CM

## 2018-11-01 LAB
B-HCG UR QL: NEGATIVE
CTP QC/QA: YES

## 2018-11-01 PROCEDURE — 63600175 PHARM REV CODE 636 W HCPCS: Performed by: SURGERY

## 2018-11-01 PROCEDURE — 36000706: Performed by: SURGERY

## 2018-11-01 PROCEDURE — 76942 ECHO GUIDE FOR BIOPSY: CPT | Performed by: ANESTHESIOLOGY

## 2018-11-01 PROCEDURE — 37000008 HC ANESTHESIA 1ST 15 MINUTES: Performed by: SURGERY

## 2018-11-01 PROCEDURE — 63600175 PHARM REV CODE 636 W HCPCS: Performed by: ANESTHESIOLOGY

## 2018-11-01 PROCEDURE — 71000033 HC RECOVERY, INTIAL HOUR: Performed by: SURGERY

## 2018-11-01 PROCEDURE — 36000707: Performed by: SURGERY

## 2018-11-01 PROCEDURE — 19318 BREAST REDUCTION: CPT | Mod: 50,,, | Performed by: SURGERY

## 2018-11-01 PROCEDURE — D9220A PRA ANESTHESIA: Mod: ,,, | Performed by: ANESTHESIOLOGY

## 2018-11-01 PROCEDURE — 88305 TISSUE EXAM BY PATHOLOGIST: CPT | Performed by: PATHOLOGY

## 2018-11-01 PROCEDURE — C1729 CATH, DRAINAGE: HCPCS | Performed by: SURGERY

## 2018-11-01 PROCEDURE — 88305 TISSUE EXAM BY PATHOLOGIST: CPT | Mod: 26,,, | Performed by: PATHOLOGY

## 2018-11-01 PROCEDURE — 63600175 PHARM REV CODE 636 W HCPCS: Performed by: STUDENT IN AN ORGANIZED HEALTH CARE EDUCATION/TRAINING PROGRAM

## 2018-11-01 PROCEDURE — 25000003 PHARM REV CODE 250: Performed by: SURGERY

## 2018-11-01 PROCEDURE — 25000003 PHARM REV CODE 250: Performed by: ANESTHESIOLOGY

## 2018-11-01 PROCEDURE — 64461 PVB THORACIC SINGLE INJ SITE: CPT | Mod: 59,50,, | Performed by: ANESTHESIOLOGY

## 2018-11-01 PROCEDURE — 63600175 PHARM REV CODE 636 W HCPCS: Performed by: NURSE ANESTHETIST, CERTIFIED REGISTERED

## 2018-11-01 PROCEDURE — 71000016 HC POSTOP RECOV ADDL HR: Performed by: SURGERY

## 2018-11-01 PROCEDURE — 37000009 HC ANESTHESIA EA ADD 15 MINS: Performed by: SURGERY

## 2018-11-01 PROCEDURE — 25000003 PHARM REV CODE 250: Performed by: STUDENT IN AN ORGANIZED HEALTH CARE EDUCATION/TRAINING PROGRAM

## 2018-11-01 PROCEDURE — 71000015 HC POSTOP RECOV 1ST HR: Performed by: SURGERY

## 2018-11-01 PROCEDURE — 94761 N-INVAS EAR/PLS OXIMETRY MLT: CPT

## 2018-11-01 PROCEDURE — 25000003 PHARM REV CODE 250: Performed by: NURSE ANESTHETIST, CERTIFIED REGISTERED

## 2018-11-01 RX ORDER — HYDROMORPHONE HYDROCHLORIDE 1 MG/ML
0.2 INJECTION, SOLUTION INTRAMUSCULAR; INTRAVENOUS; SUBCUTANEOUS EVERY 5 MIN PRN
Status: DISCONTINUED | OUTPATIENT
Start: 2018-11-01 | End: 2018-11-01 | Stop reason: HOSPADM

## 2018-11-01 RX ORDER — GLYCOPYRROLATE 0.2 MG/ML
INJECTION INTRAMUSCULAR; INTRAVENOUS
Status: DISCONTINUED | OUTPATIENT
Start: 2018-11-01 | End: 2018-11-01

## 2018-11-01 RX ORDER — NEOSTIGMINE METHYLSULFATE 1 MG/ML
INJECTION, SOLUTION INTRAVENOUS
Status: DISCONTINUED | OUTPATIENT
Start: 2018-11-01 | End: 2018-11-01

## 2018-11-01 RX ORDER — PHENYLEPHRINE HYDROCHLORIDE 10 MG/ML
INJECTION INTRAVENOUS
Status: DISCONTINUED | OUTPATIENT
Start: 2018-11-01 | End: 2018-11-01

## 2018-11-01 RX ORDER — SODIUM CHLORIDE 0.9 % (FLUSH) 0.9 %
3 SYRINGE (ML) INJECTION
Status: DISCONTINUED | OUTPATIENT
Start: 2018-11-01 | End: 2018-11-01 | Stop reason: HOSPADM

## 2018-11-01 RX ORDER — PROPOFOL 10 MG/ML
VIAL (ML) INTRAVENOUS
Status: DISCONTINUED | OUTPATIENT
Start: 2018-11-01 | End: 2018-11-01

## 2018-11-01 RX ORDER — FENTANYL CITRATE 50 UG/ML
INJECTION, SOLUTION INTRAMUSCULAR; INTRAVENOUS
Status: DISCONTINUED | OUTPATIENT
Start: 2018-11-01 | End: 2018-11-01

## 2018-11-01 RX ORDER — ONDANSETRON 2 MG/ML
4 INJECTION INTRAMUSCULAR; INTRAVENOUS ONCE
Status: COMPLETED | OUTPATIENT
Start: 2018-11-01 | End: 2018-11-01

## 2018-11-01 RX ORDER — DEXAMETHASONE SODIUM PHOSPHATE 4 MG/ML
INJECTION, SOLUTION INTRA-ARTICULAR; INTRALESIONAL; INTRAMUSCULAR; INTRAVENOUS; SOFT TISSUE
Status: DISCONTINUED | OUTPATIENT
Start: 2018-11-01 | End: 2018-11-01

## 2018-11-01 RX ORDER — OXYCODONE AND ACETAMINOPHEN 5; 325 MG/1; MG/1
TABLET ORAL
Status: DISCONTINUED
Start: 2018-11-01 | End: 2018-11-01 | Stop reason: HOSPADM

## 2018-11-01 RX ORDER — LIDOCAINE HYDROCHLORIDE AND EPINEPHRINE 10; 10 MG/ML; UG/ML
INJECTION, SOLUTION INFILTRATION; PERINEURAL
Status: DISCONTINUED | OUTPATIENT
Start: 2018-11-01 | End: 2018-11-01 | Stop reason: HOSPADM

## 2018-11-01 RX ORDER — ACETAMINOPHEN 10 MG/ML
INJECTION, SOLUTION INTRAVENOUS
Status: DISCONTINUED | OUTPATIENT
Start: 2018-11-01 | End: 2018-11-01

## 2018-11-01 RX ORDER — BUPIVACAINE HYDROCHLORIDE AND EPINEPHRINE 5; 5 MG/ML; UG/ML
INJECTION, SOLUTION EPIDURAL; INTRACAUDAL; PERINEURAL
Status: COMPLETED | OUTPATIENT
Start: 2018-11-01 | End: 2018-11-01

## 2018-11-01 RX ORDER — FENTANYL CITRATE 50 UG/ML
100 INJECTION, SOLUTION INTRAMUSCULAR; INTRAVENOUS EVERY 5 MIN PRN
Status: DISCONTINUED | OUTPATIENT
Start: 2018-11-01 | End: 2018-11-01 | Stop reason: HOSPADM

## 2018-11-01 RX ORDER — SODIUM CHLORIDE 9 MG/ML
INJECTION, SOLUTION INTRAVENOUS CONTINUOUS PRN
Status: DISCONTINUED | OUTPATIENT
Start: 2018-11-01 | End: 2018-11-01

## 2018-11-01 RX ORDER — SODIUM CHLORIDE 0.9 % (FLUSH) 0.9 %
5 SYRINGE (ML) INJECTION
Status: DISCONTINUED | OUTPATIENT
Start: 2018-11-01 | End: 2018-11-01 | Stop reason: HOSPADM

## 2018-11-01 RX ORDER — MIDAZOLAM HYDROCHLORIDE 1 MG/ML
0.5 INJECTION INTRAMUSCULAR; INTRAVENOUS
Status: DISCONTINUED | OUTPATIENT
Start: 2018-11-01 | End: 2018-11-01 | Stop reason: HOSPADM

## 2018-11-01 RX ORDER — HEPARIN SODIUM 5000 [USP'U]/ML
5000 INJECTION, SOLUTION INTRAVENOUS; SUBCUTANEOUS EVERY 8 HOURS
Status: DISCONTINUED | OUTPATIENT
Start: 2018-11-01 | End: 2018-11-01 | Stop reason: HOSPADM

## 2018-11-01 RX ORDER — ROCURONIUM BROMIDE 10 MG/ML
INJECTION, SOLUTION INTRAVENOUS
Status: DISCONTINUED | OUTPATIENT
Start: 2018-11-01 | End: 2018-11-01

## 2018-11-01 RX ORDER — ONDANSETRON 8 MG/1
8 TABLET, ORALLY DISINTEGRATING ORAL EVERY 8 HOURS PRN
Status: DISCONTINUED | OUTPATIENT
Start: 2018-11-01 | End: 2018-11-01 | Stop reason: HOSPADM

## 2018-11-01 RX ORDER — CEPHALEXIN 500 MG/1
500 CAPSULE ORAL EVERY 6 HOURS
Qty: 40 CAPSULE | Refills: 0 | Status: SHIPPED | OUTPATIENT
Start: 2018-11-01 | End: 2018-11-01 | Stop reason: SDUPTHER

## 2018-11-01 RX ORDER — OXYCODONE AND ACETAMINOPHEN 5; 325 MG/1; MG/1
1 TABLET ORAL ONCE AS NEEDED
Status: COMPLETED | OUTPATIENT
Start: 2018-11-01 | End: 2018-11-01

## 2018-11-01 RX ORDER — LIDOCAINE HYDROCHLORIDE 10 MG/ML
1 INJECTION, SOLUTION EPIDURAL; INFILTRATION; INTRACAUDAL; PERINEURAL ONCE
Status: COMPLETED | OUTPATIENT
Start: 2018-11-01 | End: 2018-11-01

## 2018-11-01 RX ORDER — HYDROMORPHONE HYDROCHLORIDE 1 MG/ML
INJECTION, SOLUTION INTRAMUSCULAR; INTRAVENOUS; SUBCUTANEOUS
Status: DISCONTINUED
Start: 2018-11-01 | End: 2018-11-01 | Stop reason: HOSPADM

## 2018-11-01 RX ORDER — LIDOCAINE HCL/PF 100 MG/5ML
SYRINGE (ML) INTRAVENOUS
Status: DISCONTINUED | OUTPATIENT
Start: 2018-11-01 | End: 2018-11-01

## 2018-11-01 RX ORDER — HYDROCODONE BITARTRATE AND ACETAMINOPHEN 5; 325 MG/1; MG/1
1 TABLET ORAL EVERY 4 HOURS PRN
Status: DISCONTINUED | OUTPATIENT
Start: 2018-11-01 | End: 2018-11-01 | Stop reason: HOSPADM

## 2018-11-01 RX ORDER — BACITRACIN ZINC 500 UNIT/G
OINTMENT (GRAM) TOPICAL 3 TIMES DAILY
Status: DISCONTINUED | OUTPATIENT
Start: 2018-11-01 | End: 2018-11-01 | Stop reason: HOSPADM

## 2018-11-01 RX ORDER — ESMOLOL HYDROCHLORIDE 10 MG/ML
INJECTION INTRAVENOUS
Status: DISCONTINUED | OUTPATIENT
Start: 2018-11-01 | End: 2018-11-01

## 2018-11-01 RX ORDER — OXYCODONE HYDROCHLORIDE 5 MG/1
5 CAPSULE ORAL EVERY 4 HOURS PRN
Qty: 31 CAPSULE | Refills: 0 | Status: SHIPPED | OUTPATIENT
Start: 2018-11-01 | End: 2019-01-18

## 2018-11-01 RX ORDER — ONDANSETRON 2 MG/ML
INJECTION INTRAMUSCULAR; INTRAVENOUS
Status: DISCONTINUED | OUTPATIENT
Start: 2018-11-01 | End: 2018-11-01

## 2018-11-01 RX ORDER — CEPHALEXIN 500 MG/1
500 CAPSULE ORAL EVERY 6 HOURS
Qty: 40 CAPSULE | Refills: 0 | Status: SHIPPED | OUTPATIENT
Start: 2018-11-01 | End: 2018-11-11

## 2018-11-01 RX ORDER — KETAMINE HYDROCHLORIDE 10 MG/ML
INJECTION, SOLUTION INTRAMUSCULAR; INTRAVENOUS
Status: DISCONTINUED | OUTPATIENT
Start: 2018-11-01 | End: 2018-11-01

## 2018-11-01 RX ORDER — OXYCODONE HYDROCHLORIDE 5 MG/1
5 CAPSULE ORAL EVERY 4 HOURS PRN
Qty: 31 CAPSULE | Refills: 0 | Status: SHIPPED | OUTPATIENT
Start: 2018-11-01 | End: 2018-11-01 | Stop reason: SDUPTHER

## 2018-11-01 RX ORDER — CEFAZOLIN SODIUM 1 G/3ML
2 INJECTION, POWDER, FOR SOLUTION INTRAMUSCULAR; INTRAVENOUS
Status: COMPLETED | OUTPATIENT
Start: 2018-11-01 | End: 2018-11-01

## 2018-11-01 RX ADMIN — CEFAZOLIN 2 G: 330 INJECTION, POWDER, FOR SOLUTION INTRAMUSCULAR; INTRAVENOUS at 08:11

## 2018-11-01 RX ADMIN — PHENYLEPHRINE HYDROCHLORIDE 200 MCG: 10 INJECTION INTRAVENOUS at 09:11

## 2018-11-01 RX ADMIN — ONDANSETRON 4 MG: 2 INJECTION INTRAMUSCULAR; INTRAVENOUS at 12:11

## 2018-11-01 RX ADMIN — ROCURONIUM BROMIDE 40 MG: 10 INJECTION, SOLUTION INTRAVENOUS at 07:11

## 2018-11-01 RX ADMIN — PHENYLEPHRINE HYDROCHLORIDE 100 MCG: 10 INJECTION INTRAVENOUS at 10:11

## 2018-11-01 RX ADMIN — FENTANYL CITRATE 25 MCG: 50 INJECTION, SOLUTION INTRAMUSCULAR; INTRAVENOUS at 09:11

## 2018-11-01 RX ADMIN — KETAMINE HYDROCHLORIDE 10 MG: 10 INJECTION, SOLUTION INTRAMUSCULAR; INTRAVENOUS at 10:11

## 2018-11-01 RX ADMIN — HEPARIN SODIUM 5000 UNITS: 5000 INJECTION, SOLUTION INTRAVENOUS; SUBCUTANEOUS at 06:11

## 2018-11-01 RX ADMIN — SODIUM CHLORIDE, SODIUM GLUCONATE, SODIUM ACETATE, POTASSIUM CHLORIDE, MAGNESIUM CHLORIDE, SODIUM PHOSPHATE, DIBASIC, AND POTASSIUM PHOSPHATE: .53; .5; .37; .037; .03; .012; .00082 INJECTION, SOLUTION INTRAVENOUS at 08:11

## 2018-11-01 RX ADMIN — PROPOFOL 170 MG: 10 INJECTION, EMULSION INTRAVENOUS at 07:11

## 2018-11-01 RX ADMIN — LIDOCAINE HYDROCHLORIDE 1 MG: 10 INJECTION, SOLUTION EPIDURAL; INFILTRATION; INTRACAUDAL; PERINEURAL at 06:11

## 2018-11-01 RX ADMIN — OXYCODONE HYDROCHLORIDE AND ACETAMINOPHEN 1 TABLET: 5; 325 TABLET ORAL at 12:11

## 2018-11-01 RX ADMIN — HYDROMORPHONE HYDROCHLORIDE 0.2 MG: 1 INJECTION, SOLUTION INTRAMUSCULAR; INTRAVENOUS; SUBCUTANEOUS at 12:11

## 2018-11-01 RX ADMIN — ONDANSETRON 4 MG: 2 INJECTION INTRAMUSCULAR; INTRAVENOUS at 11:11

## 2018-11-01 RX ADMIN — ROCURONIUM BROMIDE 10 MG: 10 INJECTION, SOLUTION INTRAVENOUS at 08:11

## 2018-11-01 RX ADMIN — FENTANYL CITRATE 25 MCG: 50 INJECTION, SOLUTION INTRAMUSCULAR; INTRAVENOUS at 11:11

## 2018-11-01 RX ADMIN — BUPIVACAINE HYDROCHLORIDE AND EPINEPHRINE BITARTRATE 40 ML: 5; .0091 INJECTION, SOLUTION EPIDURAL; INTRACAUDAL; PERINEURAL at 07:11

## 2018-11-01 RX ADMIN — PHENYLEPHRINE HYDROCHLORIDE 200 MCG: 10 INJECTION INTRAVENOUS at 10:11

## 2018-11-01 RX ADMIN — HYDROMORPHONE HYDROCHLORIDE 0.2 MG: 1 INJECTION, SOLUTION INTRAMUSCULAR; INTRAVENOUS; SUBCUTANEOUS at 11:11

## 2018-11-01 RX ADMIN — KETAMINE HYDROCHLORIDE 10 MG: 10 INJECTION, SOLUTION INTRAMUSCULAR; INTRAVENOUS at 09:11

## 2018-11-01 RX ADMIN — KETAMINE HYDROCHLORIDE 30 MG: 10 INJECTION, SOLUTION INTRAMUSCULAR; INTRAVENOUS at 08:11

## 2018-11-01 RX ADMIN — ESMOLOL HYDROCHLORIDE 20 MG: 10 INJECTION INTRAVENOUS at 08:11

## 2018-11-01 RX ADMIN — ACETAMINOPHEN 1000 MG: 10 INJECTION, SOLUTION INTRAVENOUS at 08:11

## 2018-11-01 RX ADMIN — NEOSTIGMINE METHYLSULFATE 4.5 MG: 1 INJECTION INTRAVENOUS at 11:11

## 2018-11-01 RX ADMIN — SODIUM CHLORIDE: 0.9 INJECTION, SOLUTION INTRAVENOUS at 07:11

## 2018-11-01 RX ADMIN — FENTANYL CITRATE 100 MCG: 50 INJECTION, SOLUTION INTRAMUSCULAR; INTRAVENOUS at 07:11

## 2018-11-01 RX ADMIN — LIDOCAINE HYDROCHLORIDE 75 MG: 20 INJECTION, SOLUTION INTRAVENOUS at 07:11

## 2018-11-01 RX ADMIN — GLYCOPYRROLATE 0.5 MG: 0.2 INJECTION, SOLUTION INTRAMUSCULAR; INTRAVENOUS at 11:11

## 2018-11-01 RX ADMIN — SODIUM CHLORIDE, SODIUM GLUCONATE, SODIUM ACETATE, POTASSIUM CHLORIDE, MAGNESIUM CHLORIDE, SODIUM PHOSPHATE, DIBASIC, AND POTASSIUM PHOSPHATE: .53; .5; .37; .037; .03; .012; .00082 INJECTION, SOLUTION INTRAVENOUS at 10:11

## 2018-11-01 RX ADMIN — FENTANYL CITRATE 50 MCG: 50 INJECTION INTRAMUSCULAR; INTRAVENOUS at 07:11

## 2018-11-01 RX ADMIN — MIDAZOLAM HYDROCHLORIDE 2 MG: 1 INJECTION, SOLUTION INTRAMUSCULAR; INTRAVENOUS at 07:11

## 2018-11-01 RX ADMIN — FENTANYL CITRATE 50 MCG: 50 INJECTION, SOLUTION INTRAMUSCULAR; INTRAVENOUS at 08:11

## 2018-11-01 RX ADMIN — DEXAMETHASONE SODIUM PHOSPHATE 8 MG: 4 INJECTION, SOLUTION INTRAMUSCULAR; INTRAVENOUS at 08:11

## 2018-11-01 NOTE — SUBJECTIVE & OBJECTIVE
No current facility-administered medications on file prior to encounter.      Current Outpatient Medications on File Prior to Encounter   Medication Sig    norethindrone-ethinyl estradiol (MICROGESTIN 1/20) 1-20 mg-mcg per tablet Take 1 tablet by mouth once daily.    norethindrone-ethinyl estradiol (MICROGESTIN FE 1/20, 28,) 1 mg-20 mcg (21)/75 mg (7) per tablet take 1 tablet by mouth once daily    EPINEPHrine (EPIPEN 2-BUCK) 0.3 mg/0.3 mL AtIn Inject 0.3 mLs (0.3 mg total) into the muscle once as needed (anaphylaxis).       Review of patient's allergies indicates:   Allergen Reactions    Bee sting [allergen ext-venom-honey bee] Anaphylaxis       Past Medical History:   Diagnosis Date    Bee sting allergy     Chronic idiopathic urticaria      No past surgical history on file.  Family History     Problem Relation (Age of Onset)    Diabetes Paternal Grandmother    Hyperlipidemia Mother, Father    Hypertension Father    Hypothyroidism Mother    Ovarian cancer Maternal Grandmother (70)    Pancreatic cancer Paternal Grandfather (83)    Prostate cancer Paternal Grandfather    Skin cancer Paternal Grandmother        Tobacco Use    Smoking status: Never Smoker    Smokeless tobacco: Never Used   Substance and Sexual Activity    Alcohol use: Yes     Alcohol/week: 2.4 oz     Types: 4 Glasses of wine per week    Drug use: No    Sexual activity: Yes     Partners: Male     Birth control/protection: OCP     Comment: Partner [email or web address]     Review of Systems   Constitutional: Negative for chills and fever.   HENT: Negative for sore throat.    Eyes: Negative for redness.   Respiratory: Negative for shortness of breath.    Cardiovascular: Negative for chest pain.   Gastrointestinal: Negative for abdominal pain.   Genitourinary: Negative for dysuria.   Musculoskeletal: Positive for back pain and neck pain.   Skin: Positive for rash.   Neurological: Positive for headaches.   Psychiatric/Behavioral: Negative for agitation.      Objective:     Vital Signs (Most Recent):    Vital Signs (24h Range):           There is no height or weight on file to calculate BMI.    Physical Exam   Constitutional: She appears well-developed and well-nourished. No distress.   HENT:   Head: Normocephalic and atraumatic.   Eyes: EOM are normal.   Neck: Normal range of motion.   Cardiovascular: Normal rate.   Pulmonary/Chest: Effort normal.   Abdominal: Soft.   Musculoskeletal: Normal range of motion.   Large pedunculated breasts.    Neurological: She is alert.   Skin: Skin is warm.   Psychiatric: She has a normal mood and affect. Her behavior is normal.   Nursing note and vitals reviewed.      Significant Labs:  None    Significant Diagnostics:  NOne

## 2018-11-01 NOTE — H&P
[facility]  Plastic Surgery  History & Physical    Patient Name: Roxanne Hardy  MRN: [id number]  Admission Date: (Not on file)  Attending Physician: Asa Mak, *  Primary Care Provider: Tess Quintero DO    Patient information was obtained from patient.     Subjective:     Chief Complaint/Reason for Admission: macromastia    History of Present Illness:  Patient is a 28 y.o. female presents with symptomatic bilateral large pendulous  breasts. She is a 32H. States that she has back and neck pain for greater than 8 years. Takes ibuprofen, and tylenol for this. She also participates an exercise program. Complaints of shoulder grooving from jerry straps and rashes under the breast for which she uses lotions and hydrocortisone cream.She is active. Works as a NICU nurse. By the end of the day, she reports hand/arm numbness/tingling and has difficulty sitting up straight as a result of her large breasts.     No current facility-administered medications on file prior to encounter.      Current Outpatient Medications on File Prior to Encounter   Medication Sig    norethindrone-ethinyl estradiol (MICROGESTIN 1/20) 1-20 mg-mcg per tablet Take 1 tablet by mouth once daily.    norethindrone-ethinyl estradiol (MICROGESTIN FE 1/20, 28,) 1 mg-20 mcg (21)/75 mg (7) per tablet take 1 tablet by mouth once daily    EPINEPHrine (EPIPEN 2-BUCK) 0.3 mg/0.3 mL AtIn Inject 0.3 mLs (0.3 mg total) into the muscle once as needed (anaphylaxis).       Review of patient's allergies indicates:   Allergen Reactions    Bee sting [allergen ext-venom-honey bee] Anaphylaxis       Past Medical History:   Diagnosis Date    Bee sting allergy     Chronic idiopathic urticaria      No past surgical history on file.  Family History     Problem Relation (Age of Onset)    Diabetes Paternal Grandmother    Hyperlipidemia Mother, Father    Hypertension Father    Hypothyroidism Mother    Ovarian cancer Maternal Grandmother (70)     Pancreatic cancer Paternal Grandfather (83)    Prostate cancer Paternal Grandfather    Skin cancer Paternal Grandmother        Tobacco Use    Smoking status: Never Smoker    Smokeless tobacco: Never Used   Substance and Sexual Activity    Alcohol use: Yes     Alcohol/week: 2.4 oz     Types: 4 Glasses of wine per week    Drug use: No    Sexual activity: Yes     Partners: Male     Birth control/protection: OCP     Comment: Partner [email or web address]     Review of Systems   Constitutional: Negative for chills and fever.   HENT: Negative for sore throat.    Eyes: Negative for redness.   Respiratory: Negative for shortness of breath.    Cardiovascular: Negative for chest pain.   Gastrointestinal: Negative for abdominal pain.   Genitourinary: Negative for dysuria.   Musculoskeletal: Positive for back pain and neck pain.   Skin: Positive for rash.   Neurological: Positive for headaches.   Psychiatric/Behavioral: Negative for agitation.     Objective:     Vital Signs (Most Recent):    Vital Signs (24h Range):           There is no height or weight on file to calculate BMI.    Physical Exam   Constitutional: She appears well-developed and well-nourished. No distress.   HENT:   Head: Normocephalic and atraumatic.   Eyes: EOM are normal.   Neck: Normal range of motion.   Cardiovascular: Normal rate.   Pulmonary/Chest: Effort normal.   Abdominal: Soft.   Musculoskeletal: Normal range of motion.   Large pedunculated breasts.    Neurological: She is alert.   Skin: Skin is warm.   Psychiatric: She has a normal mood and affect. Her behavior is normal.   Nursing note and vitals reviewed.      Significant Labs:  None    Significant Diagnostics:  NOne    Assessment/Plan:     Macromastia    28-year-old female with macromastia.   -undergoing bilateral breast reduction surgery today.        VTE Risk Mitigation (From admission, onward)    None          Rc Yan MD  Plastic Surgery  [facility]

## 2018-11-01 NOTE — PROGRESS NOTES
Mother and pt's boyfriend instructed on how to strip LEFTY drains and how to empty and measure output and record.  All verbalized understanding

## 2018-11-01 NOTE — DISCHARGE INSTRUCTIONS
Discharge Instructions for Breast Reduction  You had a procedure called breast reduction, or reduction mammoplasty. Breast reduction is a surgical procedure to decrease the size of a womans breast. Women choose breast reduction to relieve back pain, to decrease the size of the breasts for appearance, or to balance a difference in breast size.  Home care  · Take your medication exactly as directed.  · Keep an ice pack on your chest to relieve discomfort and to avoid extra swelling. Put the ice pack on for 20 minutes. Then leave it off for 20 minutes. Repeat as often as necessary.  · Wear the special bra or bandage you were given before discharge as directed by your health care provider. Expect to wear the bra or wrap 24 hours a day for about 3 to 4 weeks. You may remove it when you shower, starting 2 days after your surgery.  · Shower as necessary, starting 2 days after surgery. Gently wash your incision site. Pat the incision dry. Dont apply lotions, oils, or creams.  · Do not submerge your incision in a tub bath until it is completely closed. Doing so may introduce bacteria and cause an infection.  · You will have a dressing over your incisions. Be sure to ask your healthcare provider how to care for your dressing. Your stitches may dissolve on their own. Or, they may be removed at a follow-up appointment. If you have small, white adhesive strips at your incision sites, do not remove them. They will come off on their own.  · Make an appointment to have your stitches or staples removed in 7 to 10 days.  Activity  · Dont raise your arms above breast level until your health care provider says its OK.  · Dont lift, push, or pull anything heavier than 10 pounds for at least 5 to 7 days.  · Sleep on your back. Use pillows to keep the upper part of your body elevated.  · Dont drive until your healthcare provider says its OK.  Follow-up  Make a follow-up appointment as directed by our staff.  When to call your  healthcare provider  Call your healthcare provider right away if you have any of the following:  · Trouble breathing, sudden shortness of breath or gradual shortness of breath that gets worse  · Bleeding or drainage through the special bra or bandage  · Pain that is not relieved by medication  · More soreness, swelling, or bruising on 1 breast than the other  · Redness in the breasts or warmth to the touch  · Any rapid swelling in 1 area or breast  · Sudden chest pain  · Fever of 100.4°F (38°C) or higher, or chills  · Increasing pain with or without activity   Date Last Reviewed: 4/20/2015  © 7885-0590 [employer]. [address] [place] [address], [place] [address]. All rights reserved. This information is not intended as a substitute for professional medical care. Always follow your healthcare professional's instructions.      Discharge Instructions: Caring for Your River-Lee Drainage Tube  Your doctor discharges you with a River-Lee drainage tube. Doctors commonly leave this drain within the abdominal cavity after surgery. It helps drain and collect blood and body fluid after surgery. This can prevent swelling and reduces the risk for infection. The tube is held in place by a few stitches. It is covered with a bandage. Your doctor will remove the drain when he or she determines you no longer need it.  Home care  · Dont sleep on the same side as the tube.  · Secure the tube and bag inside your clothing with a safety pin. This helps keep the tube from being pulled out.  · Empty your drain at least twice a day. Empty it more often if the drain is full. Wash  and dry your hands before emptying the drain.  ¨ Lift the opening on the drain.  ¨ Drain the fluid into a measuring cup.  ¨ Record the amount of fluid each time you empty the drain. Include the date and time it was emptied. Share this information with your doctor on your next visit.  ¨ Squeeze the bulb with your hands until you hear air coming out  of the bulb if your doctor has instructed you to do so (sometimes the bulb is used as a reservoir without suction). Check with your doctor about specific drain instructions.  ¨ Close the opening.  · Change the dressing around the tube every day.  ¨ Wash your hands.  ¨ Remove the old bandage.  ¨ Wash your hands again.  ¨ Wet a cotton swab and clean the skin around the incision and tube site. Use normal saline solution (salt and water). Or, you can use warm, soapy water.  ¨ Put a new bandage on the incision and tube site. Make the bandage large enough to cover the whole incision area.  ¨ Tape the bandage in place.  · Keep the bandage and tube site dry when you shower. Ask your healthcare provider about the best way to do this.  · Stripping the tube helps keep blood clots from blocking the tube. Ask your nurse how often you should strip the tube. Stripping may not be needed, depending on where and why your doctor placed the tube. It may even be dangerous in some cases.   ¨ Hold the tubing where it leaves the skin, with one hand. This keeps it from pulling on the skin.  ¨ Pinch the tubing with the thumb and first finger of your other hand.  ¨ Slowly and firmly pull your thumb and first finger down the tubing. You may find it helpful to hold an alcohol swab between your fingers and the tube to lubricate the tubing.  ¨ If the pulling hurts or feels like its coming out of the skin, stop. Begin again more gently.  Follow-up care  Make a follow-up appointment as directed by our staff.     When to seek medical care  Call your healthcare provider right away if you have any of the following:  · New or increased pain around the tube  · Redness, swelling, or warmth around the incision or tube  · Drainage that is foul-smelling  · Vomiting  · Fever of 100.4°F (38°C)  · Fluid leaking around the tube  · Incision seems not to be healing  · Stitches become loose  · Tube falls out or breaks  · Drainage that changes from light pink to  dark red  · Blood clots in the drainage bulb  · A sudden increase or decrease in the amount of drainage (over 30 mL)   Date Last Reviewed: 2/1/2017 © 2000-2017 [employer]. [address] [place] [address], [place] [address]. All rights reserved. This information is not intended as a substitute for professional medical care. Always follow your healthcare professional's instructions.        Anesthesia: General Anesthesia     You are watched continuously during your procedure by your anesthesia provider.     Youre due to have surgery. During surgery, youll be given medicine called anesthesia or anesthetic. This will keep you comfortable and pain-free. Your anesthesia provider will use general anesthesia.  What is general anesthesia?  General anesthesia puts you into a state like deep sleep. It goes into the bloodstream (IV anesthetics), into the lungs (gas anesthetics), or both. You feel nothing during the procedure. You will not remember it. During the procedure, the anesthesia provider monitors you continuously. He or she checks your heart rate and rhythm, blood pressure, breathing, and blood oxygen.  · IV anesthetics. IV anesthetics are given through an IV line in your arm. Theyre often given first. This is so you are asleep before a gas anesthetic is started. Some kinds of IV anesthetics relieve pain. Others relax you. Your doctor will decide which kind is best in your case.  · Gas anesthetics. Gas anesthetics are breathed into the lungs. They are often used to keep you asleep. They can be given through a facemask or a tube placed in your larynx or trachea (breathing tube).  ¨ If you have a facemask, your anesthesia provider will most likely place it over your nose and mouth while youre still awake. Youll breathe oxygen through the mask as your IV anesthetic is started. Gas anesthetic may be added through the mask.  ¨ If you have a tube in the larynx or trachea, it will be inserted into your throat after  youre asleep.  Anesthesia tools and medicines  You will likely have:  · IV anesthetics. These are put into an IV line into your bloodstream.  · Gas anesthetics. You breathe these anesthetics into your lungs, where they pass into your bloodstream.  · Pulse oximeter. This is a small clip that is attached to the end of your finger. This measures your blood oxygen level.  · Electrocardiography leads (electrodes). These are small sticky pads that are placed on your chest. They record your heart rate and rhythm.  · Blood pressure cuff. This reads your blood pressure.  Risks and possible complications  General anesthesia has some risks. These include:  · Breathing problems  · Nausea and vomiting  · Sore throat or hoarseness (usually temporary)  · Allergic reaction to the anesthetic  · Irregular heartbeat (rare)  · Cardiac arrest (rare)   Anesthesia safety  · Follow all instructions you are given for how long not to eat or drink before your procedure.  · Be sure your doctor knows what medicines and drugs you take. This includes over-the-counter medicines, herbs, supplements, alcohol or other drugs. You will be asked when those were last taken.  · Have an adult family member or friend drive you home after the procedure.  · For the first 24 hours after your surgery:  ¨ Do not drive or use heavy equipment.  ¨ Do not make important decisions or sign legal documents. If important decisions or signing legal documents is necessary during the first 24 hours after surgery, have a trusted family member or spouse act on your behalf.  ¨ Avoid alcohol.  ¨ Have a responsible adult stay with you. He or she can watch for problems and help keep you safe.  Date Last Reviewed: 12/1/2016 © 2000-2017 [employer]. [address] [place] [address], [place] [address]. All rights reserved. This information is not intended as a substitute for professional medical care. Always follow your healthcare professional's instructions.

## 2018-11-01 NOTE — HPI
Patient is a 28 y.o. female presents with symptomatic bilateral large pendulous  breasts. She is a 32H. States that she has back and neck pain for greater than 8 years. Takes ibuprofen, and tylenol for this. She also participates an exercise program. Complaints of shoulder grooving from jerry straps and rashes under the breast for which she uses lotions and hydrocortisone cream.She is active. Works as a NICU nurse. By the end of the day, she reports hand/arm numbness/tingling and has difficulty sitting up straight as a result of her large breasts.

## 2018-11-01 NOTE — PLAN OF CARE
Anesthesia MD at bedside obtaining consent. Pt needs site brisa, h/p update and surgical consent. Block team notified that pt pre-op is complete. Pt. is aware that she is scheduled for a nerve block prior to surgery.

## 2018-11-01 NOTE — ANESTHESIA PROCEDURE NOTES
Erector Spinae Plane Single Injection Block    Patient location during procedure: pre-op   Block not for primary anesthetic.  Reason for block: at surgeon's request and post-op pain management   Post-op Pain Location: bilateral breast pain  Start time: 11/1/2018 7:30 AM  Timeout: 11/1/2018 7:30 AM   End time: 11/1/2018 7:40 AM  Staffing  Anesthesiologist: Constantine Mcneal MD  Other anesthesia staff: Gurjit Ruiz MD  Performed: other anesthesia staff   Preanesthetic Checklist  Completed: patient identified, site marked, surgical consent, pre-op evaluation, timeout performed, IV checked, risks and benefits discussed and monitors and equipment checked  Peripheral Block  Patient position: sitting  Prep: ChloraPrep  Patient monitoring: heart rate, cardiac monitor, continuous pulse ox, continuous capnometry and frequent blood pressure checks  Block type: Other (Erector Spinae Plane Block)  Laterality: bilateral  Injection technique: single shot  Needle  Needle type: Stimuplex   Needle gauge: 20 G  Needle length: 4 in  Needle localization: anatomical landmarks and ultrasound guidance   -ultrasound image captured on disc.  Assessment  Injection assessment: negative aspiration, negative parasthesia and local visualized surrounding nerve  Paresthesia pain: none  Heart rate change: no  Slow fractionated injection: yes  Additional Notes  Added 1 mg of decadron and 50 mcg of clonidine to each 20 cc block.  Patient tolerated well.  See [facility] RN record for vitals.

## 2018-11-01 NOTE — BRIEF OP NOTE
[facility]  Brief Operative Note     SUMMARY     Surgery Date: 11/1/2018     Surgeon(s) and Role:     * Asa Mak MD - Primary     * Gualberto Khan MD - Resident - Assisting     * Rc Yan MD - Resident - Assisting        Pre-op Diagnosis:  Macromastia [N62]  Neck pain [M54.2]  Excoriated rash [R21]  Low back pain, unspecified back pain laterality, unspecified chronicity, with sciatica presence unspecified [M54.5]  Chronic low back pain, unspecified back pain laterality, with sciatica presence unspecified [M54.5, G89.29]    Post-op Diagnosis:  Post-Op Diagnosis Codes:     * Macromastia [N62]     * Neck pain [M54.2]     * Excoriated rash [R21]     * Low back pain, unspecified back pain laterality, unspecified chronicity, with sciatica presence unspecified [M54.5]     * Chronic low back pain, unspecified back pain laterality, with sciatica presence unspecified [M54.5, G89.29]    Procedure(s) (LRB):  MAMMOPLASTY, REDUCTION, BILATERAL (Bilateral)    Anesthesia: General    Description of the findings of the procedure: bilateral breast reduction     Findings/Key Components: approximately 1300g breast tissue removed right and 700g on left    Estimated Blood Loss: minimal         Specimens:   Specimen (12h ago, onward)    Start     Ordered    11/01/18 0924  Specimen to Pathology - Surgery  Once     Comments:  1. Right breast tissue (perm)2. Left breast tissue (perm)     Start Status   11/01/18 0924 Needs to be Collected       11/01/18 0923          Discharge Note    SUMMARY     Admit Date: 11/1/2018    Discharge Date and Time:  11/01/2018 11:59 AM    Hospital Course (synopsis of major diagnoses, care, treatment, and services provided during the course of the hospital stay): Patient tolerated the procedure well and there were no complications. She awoke from anesthesia without issue, tolerated a diet, and was comfortable going home with PO pain meds and follow up to clinic.      Final  Diagnosis: Post-Op Diagnosis Codes:     * Macromastia [N62]     * Neck pain [M54.2]     * Excoriated rash [R21]     * Low back pain, unspecified back pain laterality, unspecified chronicity, with sciatica presence unspecified [M54.5]     * Chronic low back pain, unspecified back pain laterality, with sciatica presence unspecified [M54.5, G89.29]    Disposition: Home or Self Care    Follow Up/Patient Instructions:     Medications:  Reconciled Home Medications:      Medication List      START taking these medications    cephALEXin 500 MG capsule  Commonly known as:  KEFLEX  Take 1 capsule (500 mg total) by mouth every 6 (six) hours. for 10 days     oxyCODONE 5 mg Cap  Commonly known as:  OXY-IR  Take 1 capsule (5 mg total) by mouth every 4 (four) hours as needed for Pain.        CONTINUE taking these medications    EPINEPHrine 0.3 mg/0.3 mL Atin  Commonly known as:  EPIPEN 2-BUCK  Inject 0.3 mLs (0.3 mg total) into the muscle once as needed (anaphylaxis).     norethindrone-ethinyl estradiol 1 mg-20 mcg (21)/75 mg (7) per tablet  Commonly known as:  JUNEL FE 1/20  take 1 tablet by mouth once daily     norethindrone-ethinyl estradiol 1-20 mg-mcg per tablet  Commonly known as:  MICROGESTIN 1/20  Take 1 tablet by mouth once daily.          Discharge Procedure Orders   Diet general     Call MD for:     Call MD for:  extreme fatigue     Call MD for:  persistent dizziness or light-headedness     Call MD for:  hives     Call MD for:  redness, tenderness, or signs of infection (pain, swelling, redness, odor or green/yellow discharge around incision site)     Call MD for:  difficulty breathing, headache or visual disturbances     Call MD for:  severe uncontrolled pain     Call MD for:  persistent nausea and vomiting     Call MD for:  temperature >100.4     Wound care routine (specify)   Order Comments: You can shower after 48 hours. In the shower, let warm soapy water rinse over your wounds. Do not scrub your wounds, pat dry.  Wear the surgical bra at all times unless showering. Three times a day, apply bacitracin ointment to your nipples. Please record your drain output daily.     Activity as tolerated     Follow-up Information     Asa Mak MD On 11/7/2018.    Specialty:  Plastic Surgery  Contact information:  [address]  [facility] [address]  [phone]

## 2018-11-02 ENCOUNTER — PATIENT MESSAGE (OUTPATIENT)
Dept: ADMINISTRATIVE | Facility: OTHER | Age: 28
End: 2018-11-02

## 2018-11-02 NOTE — OP NOTE
DATE OF PROCEDURE:  11/01/2018    PREOPERATIVE DIAGNOSES:  1.  Macromastia.  2.  Chronic neck pain.  3.  Chronic back pain.  4.  Chronic macerating rashes.    POSTOPERATIVE DIAGNOSES:  1.  Macromastia.  2.  Chronic neck pain.  3.  Chronic back pain.  4.  Chronic macerating rashes.    PROCEDURE PERFORMED:  Bilateral breast reduction.    SURGEON:  Asa Mak M.D., [facility]    ANESTHESIA:  General.    DESCRIPTION OF PROCEDURE:  The patient was evaluated in the preoperative holding   area in the standing position, markings were made for the inferior pedicle   breast reduction technique.  The patient stated she would like to do a B and C   cup.  I stated I would attempt to do so, but could not guarantee it.  It was   also pointed out that the patient's right breast was almost twice as large as   the left breast.  The patient was taken to the Operating Room, placed in the   supine position.  After adequate general endotracheal anesthesia, was prepped   and draped in the normal sterile fashion.  New nipple-areolar complex was marked   measuring 42 mm.  Inferior pedicle was marked measuring 9 cm at its base.  The   inferior pedicle was deepithelialized.  Good punctate bleeding was noted.  Then,   good punctate bleeding was noted.  The superomedial and superolateral flaps   were elevated to the clavicle superiorly, to the mid axillary line laterally and   the lateral border of the sternum medially.  Medial and lateral skin wedges   were excised.  The inferior pedicle was debulked.  A similar procedure was   performed on the opposite side.  The incisions were closed over drains using   interrupted 3-0 Monocryl followed by running 4-0 Monocryl subcuticular suture   for the inframammary incision.  Vertical limb and nipple-areolar complex were   closed using interrupted 4-0 Monocryl followed by running 4-0 Monocryl   subcuticular suture.  There were no complications with this procedure.  The   patient tolerated well.   Blood loss was approximately 100 mL.    SPECIMENS SENT:  1.  Right breast.  2.  Left breast.      CRB/HN  dd: 11/02/2018 13:33:31 (CDT)  td: 11/02/2018 13:58:46 (CDT)  Doc ID   #[id number]  Job ID #[id number]    CC:

## 2018-11-02 NOTE — ANESTHESIA POSTPROCEDURE EVALUATION
"Anesthesia Post Evaluation    Patient: Roxanne Hardy    Procedure(s) Performed: Procedure(s) (LRB):  MAMMOPLASTY, REDUCTION, BILATERAL (Bilateral)    Final Anesthesia Type: general  Patient location during evaluation: PACU  Patient participation: Yes- Able to Participate  Level of consciousness: awake and alert  Post-procedure vital signs: reviewed and stable  Pain management: adequate  Airway patency: patent  PONV status at discharge: No PONV  Anesthetic complications: no      Cardiovascular status: blood pressure returned to baseline  Respiratory status: unassisted  Hydration status: euvolemic  Follow-up not needed.        Visit Vitals  /81 (BP Location: Right arm, Patient Position: Lying)   Pulse 94   Temp 36.6 °C (97.9 °F) (Temporal)   Resp 20   Ht 5' 6" (1.676 m)   Wt 74.8 kg (165 lb)   LMP 10/18/2018   SpO2 99%   Breastfeeding? No   BMI 26.63 kg/m²       Pain/Ramirez Score: Pain Assessment Performed: Yes (11/1/2018  1:58 PM)  Presence of Pain: complains of pain/discomfort (11/1/2018  1:58 PM)  Pain Rating Prior to Med Admin: 6 (11/1/2018 12:10 PM)  Pain Rating Post Med Admin: 4 (11/1/2018 12:38 PM)  Ramirez Score: 10 (11/1/2018 12:38 PM)        "

## 2018-11-05 ENCOUNTER — PATIENT MESSAGE (OUTPATIENT)
Dept: INTERNAL MEDICINE | Facility: CLINIC | Age: 28
End: 2018-11-05

## 2018-11-05 ENCOUNTER — TELEPHONE (OUTPATIENT)
Dept: PLASTIC SURGERY | Facility: CLINIC | Age: 28
End: 2018-11-05

## 2018-11-05 ENCOUNTER — OFFICE VISIT (OUTPATIENT)
Dept: INTERNAL MEDICINE | Facility: CLINIC | Age: 28
End: 2018-11-05
Payer: COMMERCIAL

## 2018-11-05 ENCOUNTER — HOSPITAL ENCOUNTER (OUTPATIENT)
Dept: RADIOLOGY | Facility: HOSPITAL | Age: 28
Discharge: HOME | End: 2018-11-05
Attending: INTERNAL MEDICINE
Payer: COMMERCIAL

## 2018-11-05 VITALS
SYSTOLIC BLOOD PRESSURE: 122 MMHG | HEART RATE: 111 BPM | DIASTOLIC BLOOD PRESSURE: 64 MMHG | BODY MASS INDEX: 26.84 KG/M2 | WEIGHT: 167 LBS | HEIGHT: 66 IN | OXYGEN SATURATION: 97 % | TEMPERATURE: 98 F

## 2018-11-05 DIAGNOSIS — R50.9 FEVER, UNSPECIFIED FEVER CAUSE: ICD-10-CM

## 2018-11-05 DIAGNOSIS — B37.0 THRUSH: Primary | ICD-10-CM

## 2018-11-05 DIAGNOSIS — J02.9 SORE THROAT: ICD-10-CM

## 2018-11-05 LAB — DEPRECATED S PYO AG THROAT QL EIA: NEGATIVE

## 2018-11-05 PROCEDURE — 99214 OFFICE O/P EST MOD 30 MIN: CPT | Mod: S$GLB,,, | Performed by: INTERNAL MEDICINE

## 2018-11-05 PROCEDURE — 71046 X-RAY EXAM CHEST 2 VIEWS: CPT | Mod: TC

## 2018-11-05 PROCEDURE — 3008F BODY MASS INDEX DOCD: CPT | Mod: CPTII,S$GLB,, | Performed by: INTERNAL MEDICINE

## 2018-11-05 PROCEDURE — 87081 CULTURE SCREEN ONLY: CPT

## 2018-11-05 PROCEDURE — 99999 PR PBB SHADOW E&M-EST. PATIENT-LVL V: CPT | Mod: PBBFAC,,, | Performed by: INTERNAL MEDICINE

## 2018-11-05 PROCEDURE — 87880 STREP A ASSAY W/OPTIC: CPT

## 2018-11-05 PROCEDURE — 71046 X-RAY EXAM CHEST 2 VIEWS: CPT | Mod: 26,,, | Performed by: RADIOLOGY

## 2018-11-05 RX ORDER — NYSTATIN 100000 [USP'U]/ML
4 SUSPENSION ORAL 4 TIMES DAILY
Qty: 160 ML | Refills: 0 | Status: SHIPPED | OUTPATIENT
Start: 2018-11-05 | End: 2018-11-15

## 2018-11-05 NOTE — TELEPHONE ENCOUNTER
Spoke to patient. Patient is informed of rapid strep throat screening and to continue nystatin and please contact the office if throat pain persists. Patient sent you a message via portal asking if spiked fever was due to thrush?     Please advise. Thanks!

## 2018-11-05 NOTE — TELEPHONE ENCOUNTER
Called pt in response to portal messaeg regarding fever, mother answered and states pt is mildly febrile with sore throat. States incisions look great, denies surgical pain, denies NVD, able to eat and drink/swallow ok. Reported to MD and instructed to call PCP Dr Quintero with verbal understanding. LAUREN, LPN      ----- Message from Josefina Mckeon sent at 11/5/2018  8:08 AM CST -----  Contact: Pt mother Lola Hardy   Requesting a callback from office says pt has a fever and need to speak with someone at [phone]    Thanks

## 2018-11-05 NOTE — PROGRESS NOTES
"Subjective:       Patient ID: Roxanne Hardy is a 28 y.o. female.    Chief Complaint: Sore Throat (ongoing since procedure on last Thurs, sx like sore throat, low-high grade fever (101.2) and minor nausea. patient was prescribed Keflex to help relieve.) and Procedure (patient was seen in Gen Surgery for breast reduction on 11/01. )    HPI   29 yo F here for urgent eval of sore throat with fever 101.2.  She had breast reduction on 11/2.   Sx started yesterday. Multiple episodes of strep throat but not in last few years.   No cough.  Body aches.  No sick contacts.   Keflex and on oxy and apap/advil post surgical.  Uncomplicated intubation and anesthesia.  Denies wound drainage. Denies calf swelling or calf pain.     Review of Systems   Constitutional: Positive for fever.   HENT: Positive for sore throat.    Respiratory: Negative for shortness of breath.    Cardiovascular: Negative for chest pain.   Musculoskeletal: Negative.    Skin: Positive for rash.       Objective:   /64 (BP Location: Right arm, Patient Position: Sitting, BP Method: Medium (Manual))   Pulse (!) 111   Temp 98.4 °F (36.9 °C) (Oral)   Ht 5' 6" (1.676 m)   Wt 75.8 kg (167 lb)   LMP 10/18/2018   SpO2 97%   BMI 26.95 kg/m²      Physical Exam   Constitutional: She is oriented to person, place, and time. She appears well-developed and well-nourished. No distress.   HENT:   Head: Normocephalic and atraumatic.   Mouth/Throat: Oropharyngeal exudate present.   Throat mildly erythematous, white plaque on tongue that does scrape off partially.   Very slight tonsillar exudate vs thrush on tonsil.   Neck:   Shotty cervical LAD   Cardiovascular: Normal rate and regular rhythm.   Pulmonary/Chest: Effort normal. No respiratory distress. She has no wheezes. She has no rales.   Neurological: She is alert and oriented to person, place, and time.   Skin: Skin is warm and dry. She is not diaphoretic.   Psychiatric: She has a normal mood and affect. Her behavior " is normal.       Assessment:       1. Thrush    2. Fever, unspecified fever cause    3. Sore throat        Plan:       Roxanne was seen today for sore throat and procedure.    Diagnoses and all orders for this visit:    Thrush  -     nystatin (MYCOSTATIN) 100,000 unit/mL suspension; Take 4 mLs (400,000 Units total) by mouth 4 (four) times daily. Swish and spit for 10 days    Fever, post op  -     X-Ray Chest PA And Lateral; Future  -     THROAT SCREEN, RAPID    Sore throat  -     THROAT SCREEN, RAPID    Continue keflex. If throat screen positive with rx Augmentin.  To follow up with surgeon this week. Alert surgeon and I if fever persists.

## 2018-11-06 ENCOUNTER — PATIENT MESSAGE (OUTPATIENT)
Dept: DERMATOLOGY | Facility: CLINIC | Age: 28
End: 2018-11-06

## 2018-11-06 ENCOUNTER — NURSE TRIAGE (OUTPATIENT)
Dept: ADMINISTRATIVE | Facility: CLINIC | Age: 28
End: 2018-11-06

## 2018-11-06 ENCOUNTER — PATIENT MESSAGE (OUTPATIENT)
Dept: INTERNAL MEDICINE | Facility: CLINIC | Age: 28
End: 2018-11-06

## 2018-11-06 DIAGNOSIS — J02.9 SORE THROAT: Primary | ICD-10-CM

## 2018-11-07 ENCOUNTER — PATIENT MESSAGE (OUTPATIENT)
Dept: PLASTIC SURGERY | Facility: CLINIC | Age: 28
End: 2018-11-07

## 2018-11-07 ENCOUNTER — PATIENT MESSAGE (OUTPATIENT)
Dept: INTERNAL MEDICINE | Facility: CLINIC | Age: 28
End: 2018-11-07

## 2018-11-07 ENCOUNTER — OFFICE VISIT (OUTPATIENT)
Dept: PLASTIC SURGERY | Facility: CLINIC | Age: 28
End: 2018-11-07
Payer: COMMERCIAL

## 2018-11-07 VITALS
DIASTOLIC BLOOD PRESSURE: 80 MMHG | HEART RATE: 119 BPM | WEIGHT: 164 LBS | TEMPERATURE: 99 F | HEIGHT: 66 IN | SYSTOLIC BLOOD PRESSURE: 115 MMHG | BODY MASS INDEX: 26.36 KG/M2

## 2018-11-07 DIAGNOSIS — J02.9 SORE THROAT: ICD-10-CM

## 2018-11-07 DIAGNOSIS — Z09 SURGERY FOLLOW-UP EXAMINATION: Primary | ICD-10-CM

## 2018-11-07 LAB — BACTERIA THROAT CULT: NORMAL

## 2018-11-07 PROCEDURE — 99024 POSTOP FOLLOW-UP VISIT: CPT | Mod: S$GLB,,, | Performed by: SURGERY

## 2018-11-07 PROCEDURE — 99999 PR PBB SHADOW E&M-EST. PATIENT-LVL III: CPT | Mod: PBBFAC,,, | Performed by: SURGERY

## 2018-11-07 NOTE — TELEPHONE ENCOUNTER
Reason for Disposition   Surgical incision symptoms and questions   [1] Clear or blood-tinged fluid draining from wound AND [2] no fever    Answer Assessment - Initial Assessment Questions  Pt had breast surgery 11/1/18. Tonight her drain in right breast got pulled a little. She later noted an incision near her right nipple had some serous to serosanguinous drainage. Suture is out and has an small opening. Does have appt tomorrow with     Protocols used: ST POST-OP SYMPTOMS AND [id number],  POST-OP INCISION SYMPTOMS-A-AH

## 2018-11-07 NOTE — PROGRESS NOTES
History & Physical  Plastic Surgery Clinic    SUBJECTIVE:     Chief complaint: post op    History of Present Illness:  Patient is a 28 y.o. female 1 week s/p bilateral breast reduction.   states she had been doing well until last night when she noted that a portion of her incision was draining and looked like it had pulled apart and had some associated drainage. She also had a subjective fever 4 days ago and has been treated for thrush by PCP with good results. Drains putting out 5cc in each bulb yesterday and 15cc the day prior. Denies new fevers, chills, or any other issues at this time. Has been taking keflex.     Past Medical History:  Past Medical History:   Diagnosis Date    Bee sting allergy     Chronic idiopathic urticaria        Past Surgical History:  Past Surgical History:   Procedure Laterality Date    MAMMOPLASTY, REDUCTION, BILATERAL Bilateral 11/1/2018    Performed by Aas Mak MD at [facility] OR [address] [place] [address]    REDUCTION OF BOTH BREASTS Bilateral 11/1/2018    Procedure: MAMMOPLASTY, REDUCTION, BILATERAL;  Surgeon: Asa Mak MD;  Location: [facility] OR [address] [place] [address];  Service: Plastics;  Laterality: Bilateral;       Family History:  Family History   Problem Relation Age of Onset    Hyperlipidemia Mother     Hypothyroidism Mother     Hyperlipidemia Father     Hypertension Father     Ovarian cancer Maternal Grandmother 70    Skin cancer Paternal Grandmother     Diabetes Paternal Grandmother     Prostate cancer Paternal Grandfather     Pancreatic cancer Paternal Grandfather 83    Breast cancer Neg Hx     Colon cancer Neg Hx     Stroke Neg Hx        Social History:  Social History     Socioeconomic History    Marital status: Single     Spouse name: None    Number of children: None    Years of education: None    Highest education level: None   Social Needs    Financial resource strain: None    Food insecurity - worry: None    Food insecurity - inability: None     "Transportation needs - medical: None    Transportation needs - non-medical: None   Occupational History    None   Tobacco Use    Smoking status: Never Smoker    Smokeless tobacco: Never Used   Substance and Sexual Activity    Alcohol use: Yes     Alcohol/week: 2.4 oz     Types: 4 Glasses of wine per week    Drug use: No    Sexual activity: Yes     Partners: Male     Birth control/protection: OCP     Comment: Partner [email or web address]   Other Topics Concern    None   Social History Narrative    None       Medications:  Current Outpatient Medications   Medication Sig Dispense Refill    cephALEXin (KEFLEX) 500 MG capsule Take 1 capsule (500 mg total) by mouth every 6 (six) hours. for 10 days 40 capsule 0    EPINEPHrine (EPIPEN 2-BUCK) 0.3 mg/0.3 mL AtIn Inject 0.3 mLs (0.3 mg total) into the muscle once as needed (anaphylaxis). 0.3 mL 3    norethindrone-ethinyl estradiol (MICROGESTIN 1/20) 1-20 mg-mcg per tablet Take 1 tablet by mouth once daily. 84 tablet 3    norethindrone-ethinyl estradiol (MICROGESTIN FE 1/20, 28,) 1 mg-20 mcg (21)/75 mg (7) per tablet take 1 tablet by mouth once daily 84 tablet 3    nystatin (MYCOSTATIN) 100,000 unit/mL suspension Take 4 mLs (400,000 Units total) by mouth 4 (four) times daily. Swish and spit for 10 days 160 mL 0    oxyCODONE (OXY-IR) 5 mg Cap Take 1 capsule (5 mg total) by mouth every 4 (four) hours as needed for Pain. 31 capsule 0     No current facility-administered medications for this visit.        Allergies:  Review of patient's allergies indicates:   Allergen Reactions    Bee sting [allergen ext-venom-honey bee] Anaphylaxis       Review of Systems:  Negative except for HPI.      OBJECTIVE:     /80   Pulse (!) 119   Temp 98.8 °F (37.1 °C)   Ht 5' 6" (1.676 m)   Wt 74.4 kg (164 lb 0.4 oz)   LMP 10/18/2018   BMI 26.47 kg/m²     Physical Exam:  Constitutional: Oriented to person, place, and time. Appears well-developed and well-nourished.   Left breast " incisions are Clean, dry, intact, no evidence of erythema, induration, or drainage.  Left breast is soft with minimal ecchymosis and nipple is healthy and viable with intact sensation  Right breast incisions are Clean, dry, intact, no evidence of erythema, induration, or drainage.EXCEPT the vertical limb.  This area has 6cm by 1.5 cm area of tissue loss and underlying necrosis.  No evidence of erythema or purulent drainage.    Right breast is soft with minimal ecchymosis and nipple is healthy and viable with diminished sensation.          ASSESSMENT/PLAN:     28 y.o. female as above. The medial portion of her vertical limb has tissue loss.      ---right breast was prepped and draped in the usual sterile fashion.  The vertical limb was infiltrated with 9cc of 1%lidocain with Epi.  Necrotic tissue was removed and the wound was cleaned.  Healthy skin edges were noted.  The superior portion of the incision was re approximated using nylon suture and the inferior 1.5cm of dermis was sutured to the underlying pedical with vicryl.  The incision was dressed with bacitracin.     --patient reports drains are putting out <20cc each for the last three days and drains were removed.    Patient and her significant other instructed on wound care instruction, she may shower like normal and use bacitracin on the small opening at the inferior portion of the vertical limb. All of there questions were answered and they will follow up in one week or sooner if needed    LEIDA Khan MD, [facility]  Plastic Surgery Fellow

## 2018-11-12 ENCOUNTER — PATIENT MESSAGE (OUTPATIENT)
Dept: ADMINISTRATIVE | Facility: OTHER | Age: 28
End: 2018-11-12

## 2018-11-12 ENCOUNTER — OFFICE VISIT (OUTPATIENT)
Dept: PLASTIC SURGERY | Facility: CLINIC | Age: 28
End: 2018-11-12
Payer: COMMERCIAL

## 2018-11-12 VITALS — BODY MASS INDEX: 25.86 KG/M2 | HEIGHT: 66 IN | RESPIRATION RATE: 18 BRPM | HEART RATE: 100 BPM | WEIGHT: 160.94 LBS

## 2018-11-12 DIAGNOSIS — Z09 SURGERY FOLLOW-UP EXAMINATION: Primary | ICD-10-CM

## 2018-11-12 PROCEDURE — 99024 POSTOP FOLLOW-UP VISIT: CPT | Mod: S$GLB,,, | Performed by: SURGERY

## 2018-11-12 PROCEDURE — 99999 PR PBB SHADOW E&M-EST. PATIENT-LVL III: CPT | Mod: PBBFAC,,, | Performed by: SURGERY

## 2018-11-12 NOTE — ADDENDUM NOTE
Addendum  created 11/12/18 5569 by Gurjit Ruiz MD    Diagnosis association updated, Intraprocedure Blocks edited, LDA updated via procedure documentation, Sign clinical note

## 2018-11-13 NOTE — PROGRESS NOTES
Ms. Agudelo presents after having a bilateral breast reduction.  She did lose some   skin at the vertical limb of her right breast reduction.  The majority of this   was debrided and closed in clinic last week.  She did have an opening, 2 cm x 1   cm, which is superficial, excellent granulation tissue.  This should heal nicely   with no problems.  She was given PolyMem.  She will use PolyMem dressings.  I   expect this to heal within three to four weeks.      CRB/[facility]  dd: 11/13/2018 16:37:32 (CST)  td: 11/13/2018 21:23:44 (CST)  Doc ID   #[id number]  Job ID #[id number]    CC:

## 2018-11-14 NOTE — H&P
Ms. Agudelo presents after having a bilateral breast reduction.  She did lose some   skin at the vertical limb of her right breast reduction.  The majority of this   was debrided and closed in clinic last week.  She did have an opening, 2 cm x 1   cm, which is superficial, excellent granulation tissue.  This should heal nicely   with no problems.  She was given PolyMem.  She will use PolyMem dressings.  I   expect this to heal within three to four weeks.      CRB/[facility]  dd: 11/13/2018 16:37:32 (CST)  td: 11/13/2018 21:23:44 (CST)  Doc ID   #[id number]  Job ID #[id number]    CC:

## 2018-11-21 ENCOUNTER — OFFICE VISIT (OUTPATIENT)
Dept: PLASTIC SURGERY | Facility: CLINIC | Age: 28
End: 2018-11-21
Payer: COMMERCIAL

## 2018-11-21 VITALS
BODY MASS INDEX: 26.49 KG/M2 | DIASTOLIC BLOOD PRESSURE: 85 MMHG | HEART RATE: 95 BPM | HEIGHT: 66 IN | TEMPERATURE: 99 F | SYSTOLIC BLOOD PRESSURE: 123 MMHG | WEIGHT: 164.81 LBS

## 2018-11-21 DIAGNOSIS — Z09 SURGERY FOLLOW-UP EXAMINATION: Primary | ICD-10-CM

## 2018-11-21 PROCEDURE — 99024 POSTOP FOLLOW-UP VISIT: CPT | Mod: S$GLB,,, | Performed by: SURGERY

## 2018-11-21 PROCEDURE — 99999 PR PBB SHADOW E&M-EST. PATIENT-LVL III: CPT | Mod: PBBFAC,,, | Performed by: SURGERY

## 2018-11-21 NOTE — LETTER
November 21, 2018      Roxanne Hardy  [address] [address]             [facility] [place]  [address] [facility] [address]  Phone: [phone]  Fax: [phone] Patient: Roxanne Hardy  MRN: [id number]  YOB: 1990  Date of Visit: 11/21/2018    To Whom It May Concern:    Roxanne Campos was seen in the Plastic Surgery Clinic on 11/21/2018. She may return to work/school on Sunday, November 25, 2018 with restrictions. No lifting, pushing or pulling greater than 10 pounds until her next evaluation. If you have any questions or concerns, or if I can be of further assistance, please do not hesitate to contact my office.    Sincerely,          Asa Mak MD  Section of Plastic & Reconstructive Surgery  [facility]

## 2018-11-21 NOTE — PROGRESS NOTES
Improved since her last visit.  She has a 2 by 2 cm wound which needs to re-epithelialized.  Has been using polymem.  Healing slowly.  No sign of infection.  Will see in 4 weeks

## 2018-11-27 ENCOUNTER — TELEPHONE (OUTPATIENT)
Dept: PLASTIC SURGERY | Facility: CLINIC | Age: 28
End: 2018-11-27

## 2018-11-27 NOTE — TELEPHONE ENCOUNTER
Called to confirm appt for 12/4, simple wound care and bra ok with no underwire. TOM AGUILAR    ----- Message from Gina Reid sent at 11/27/2018 12:08 PM CST -----  Contact: Pt  Needs Advice    Reason for call: Calling to speak with a nurse regarding verifying if she needs to be seen this week instead of next week due to her stating the doctor wanted to see her weekly until the wound healed. Pt also stated she has questions she would like to ask as well.         Communication Preference: [phone]    Additional Information: N/A

## 2018-11-27 NOTE — TELEPHONE ENCOUNTER
Called pt back regarding stitches, denies s/s of infection at this time, informed ok to leave stitches until next appt if no other issues. TOM AGUILAR    ----- Message from Ruby Chao sent at 11/27/2018  3:05 PM CST -----  Contact: self  Pt called in about wanting to speak with anyone from Dr team. Pt has question about stitches that was put in a week post-op          Pt can be reached at [phone]      TY

## 2018-12-04 ENCOUNTER — OFFICE VISIT (OUTPATIENT)
Dept: PLASTIC SURGERY | Facility: CLINIC | Age: 28
End: 2018-12-04
Payer: COMMERCIAL

## 2018-12-04 VITALS
DIASTOLIC BLOOD PRESSURE: 72 MMHG | SYSTOLIC BLOOD PRESSURE: 118 MMHG | WEIGHT: 165.38 LBS | HEIGHT: 66 IN | HEART RATE: 96 BPM | BODY MASS INDEX: 26.58 KG/M2 | TEMPERATURE: 98 F

## 2018-12-04 DIAGNOSIS — Z09 SURGERY FOLLOW-UP EXAMINATION: Primary | ICD-10-CM

## 2018-12-04 PROCEDURE — 99024 POSTOP FOLLOW-UP VISIT: CPT | Mod: S$GLB,,, | Performed by: PHYSICIAN ASSISTANT

## 2018-12-04 PROCEDURE — 99999 PR PBB SHADOW E&M-EST. PATIENT-LVL III: CPT | Mod: PBBFAC,,, | Performed by: PHYSICIAN ASSISTANT

## 2018-12-04 NOTE — PROGRESS NOTES
Roxanne Hardy presents to Plastic Surgery Clinic on 12/4/2018 for a follow up visit status post bilateral breast reduction on 11/01/2018. She is here today for sutures removal from right breast. She is doing well with no issues. She denies fever, chills, nausea, vomiting, or any systemic signs of infection.     Review of patient's allergies indicates:   Allergen Reactions    Bee sting [allergen ext-venom-honey bee] Anaphylaxis     Current Outpatient Medications on File Prior to Visit   Medication Sig Dispense Refill    norethindrone-ethinyl estradiol (MICROGESTIN 1/20) 1-20 mg-mcg per tablet Take 1 tablet by mouth once daily. 84 tablet 3    (Magic mouthwash) 1:1:1 Benadryl 12.5mg/5ml liq, aluminum & magnesium hydroxide-simehticone (Maalox), lidocaine viscous 2% Swish and spit 5 mLs every 4 (four) hours as needed (sore throat). for mouth sores 100 mL 0    EPINEPHrine (EPIPEN 2-BUCK) 0.3 mg/0.3 mL AtIn Inject 0.3 mLs (0.3 mg total) into the muscle once as needed (anaphylaxis). 0.3 mL 3    norethindrone-ethinyl estradiol (MICROGESTIN FE 1/20, 28,) 1 mg-20 mcg (21)/75 mg (7) per tablet take 1 tablet by mouth once daily 84 tablet 3    oxyCODONE (OXY-IR) 5 mg Cap Take 1 capsule (5 mg total) by mouth every 4 (four) hours as needed for Pain. 31 capsule 0     No current facility-administered medications on file prior to visit.      Patient Active Problem List   Diagnosis    Chronic idiopathic urticaria    Bee sting allergy    Macromastia     Past Surgical History:   Procedure Laterality Date    MAMMOPLASTY, REDUCTION, BILATERAL Bilateral 11/1/2018    Performed by Asa Mak MD at [facility] OR [address] [place] [address]    REDUCTION OF BOTH BREASTS Bilateral 11/1/2018    Procedure: MAMMOPLASTY, REDUCTION, BILATERAL;  Surgeon: Asa Mak MD;  Location: [facility] OR [address] [place] [address];  Service: Plastics;  Laterality: Bilateral;     Social History     Socioeconomic History    Marital status: Single     Spouse name: Not on file     Number of children: Not on file    Years of education: Not on file    Highest education level: Not on file   Social Needs    Financial resource strain: Not on file    Food insecurity - worry: Not on file    Food insecurity - inability: Not on file    Transportation needs - medical: Not on file    Transportation needs - non-medical: Not on file   Occupational History    Not on file   Tobacco Use    Smoking status: Never Smoker    Smokeless tobacco: Never Used   Substance and Sexual Activity    Alcohol use: Yes     Alcohol/week: 2.4 oz     Types: 4 Glasses of wine per week    Drug use: No    Sexual activity: Yes     Partners: Male     Birth control/protection: OCP     Comment: Partner [email or web address]   Other Topics Concern    Not on file   Social History Narrative    Not on file     PHYSICAL EXAMINATION  Vitals:    12/04/18 1427   BP: 118/72   Pulse: 96   Temp: 98.2 °F (36.8 °C)     WD WN NAD  VSS  Normal resp effort  R breast - incision CDI, no erythema/drainage, small opening is clean with granulation tissue, nipple viable + sensation  L breast - incision CDI, no erythema/drainage, nipple viable + sensation    ASSESSMENT/PLAN  28 y.o. F s/p bilateral breast reduction  - Doing well, no issues. Pleased with outcome.   - All sutures were removed, continue with PolyMem daily.   - RTC x 1 week.     All questions were answered. The patient was advised to call the clinic with any questions or concerns prior to their next visit.

## 2018-12-11 ENCOUNTER — OFFICE VISIT (OUTPATIENT)
Dept: PLASTIC SURGERY | Facility: CLINIC | Age: 28
End: 2018-12-11
Payer: COMMERCIAL

## 2018-12-11 DIAGNOSIS — Z09 SURGERY FOLLOW-UP EXAMINATION: Primary | ICD-10-CM

## 2018-12-11 PROCEDURE — 99024 POSTOP FOLLOW-UP VISIT: CPT | Mod: S$GLB,,, | Performed by: PHYSICIAN ASSISTANT

## 2018-12-11 PROCEDURE — 99999 PR PBB SHADOW E&M-EST. PATIENT-LVL II: CPT | Mod: PBBFAC,,, | Performed by: PHYSICIAN ASSISTANT

## 2018-12-11 NOTE — PROGRESS NOTES
Roxanne Hardy presents to Plastic Surgery Clinic on 12/11/2018 for a follow up visit status post bilateral breast reduction on 11/01/2018. She isoing well with no issues. She denies fever, chills, nausea, vomiting, or any systemic signs of infection.     Review of patient's allergies indicates:   Allergen Reactions    Bee sting [allergen ext-venom-honey bee] Anaphylaxis     Current Outpatient Medications on File Prior to Visit   Medication Sig Dispense Refill    (Magic mouthwash) 1:1:1 Benadryl 12.5mg/5ml liq, aluminum & magnesium hydroxide-simehticone (Maalox), lidocaine viscous 2% Swish and spit 5 mLs every 4 (four) hours as needed (sore throat). for mouth sores 100 mL 0    EPINEPHrine (EPIPEN 2-BUCK) 0.3 mg/0.3 mL AtIn Inject 0.3 mLs (0.3 mg total) into the muscle once as needed (anaphylaxis). 0.3 mL 3    norethindrone-ethinyl estradiol (MICROGESTIN 1/20) 1-20 mg-mcg per tablet Take 1 tablet by mouth once daily. 84 tablet 3    norethindrone-ethinyl estradiol (MICROGESTIN FE 1/20, 28,) 1 mg-20 mcg (21)/75 mg (7) per tablet take 1 tablet by mouth once daily 84 tablet 3    oxyCODONE (OXY-IR) 5 mg Cap Take 1 capsule (5 mg total) by mouth every 4 (four) hours as needed for Pain. 31 capsule 0     No current facility-administered medications on file prior to visit.      Patient Active Problem List   Diagnosis    Chronic idiopathic urticaria    Bee sting allergy    Macromastia     Past Surgical History:   Procedure Laterality Date    MAMMOPLASTY, REDUCTION, BILATERAL Bilateral 11/1/2018    Performed by Asa Mak MD at [facility] OR [address] [place] [address]    REDUCTION OF BOTH BREASTS Bilateral 11/1/2018    Procedure: MAMMOPLASTY, REDUCTION, BILATERAL;  Surgeon: Asa Mak MD;  Location: [facility] OR [address] [place] [address];  Service: Plastics;  Laterality: Bilateral;     Social History     Socioeconomic History    Marital status: Single     Spouse name: Not on file    Number of children: Not on file    Years of education: Not  on file    Highest education level: Not on file   Social Needs    Financial resource strain: Not on file    Food insecurity - worry: Not on file    Food insecurity - inability: Not on file    Transportation needs - medical: Not on file    Transportation needs - non-medical: Not on file   Occupational History    Not on file   Tobacco Use    Smoking status: Never Smoker    Smokeless tobacco: Never Used   Substance and Sexual Activity    Alcohol use: Yes     Alcohol/week: 2.4 oz     Types: 4 Glasses of wine per week    Drug use: No    Sexual activity: Yes     Partners: Male     Birth control/protection: OCP     Comment: Partner [email or web address]   Other Topics Concern    Not on file   Social History Narrative    Not on file     PHYSICAL EXAMINATION  WD WN NAD  Normal resp effort  R breast - incision CDI, no erythema/drainage, no open wounds, nipple viable, + rash along IMF incision  L breast - incision CDI, no erythema/drainage, nipple viable, + rash along IMF incision    ASSESSMENT/PLAN  28 y.o. F s/p bilateral breast reduction  - Doing well, no issues. Pleased with outcome.   - Can d/c Polymem dressing  - Apply topical hydrocortisone cream to rash and oral Benadryl PRN. If not improved by Friday with OTC treatment, will prescribe Medrol dosepak. Patient to call with update.     All questions were answered. The patient was advised to call the clinic with any questions or concerns prior to their next visit.

## 2018-12-14 RX ORDER — METHYLPREDNISOLONE 4 MG/1
TABLET ORAL
Qty: 21 TABLET | Refills: 0 | Status: SHIPPED | OUTPATIENT
Start: 2018-12-14 | End: 2019-01-18

## 2019-01-09 ENCOUNTER — OFFICE VISIT (OUTPATIENT)
Dept: PLASTIC SURGERY | Facility: CLINIC | Age: 29
End: 2019-01-09
Payer: COMMERCIAL

## 2019-01-09 ENCOUNTER — TELEPHONE (OUTPATIENT)
Dept: OBSTETRICS AND GYNECOLOGY | Facility: CLINIC | Age: 29
End: 2019-01-09

## 2019-01-09 VITALS
WEIGHT: 166.88 LBS | DIASTOLIC BLOOD PRESSURE: 84 MMHG | HEIGHT: 66 IN | TEMPERATURE: 99 F | SYSTOLIC BLOOD PRESSURE: 122 MMHG | BODY MASS INDEX: 26.82 KG/M2 | HEART RATE: 94 BPM

## 2019-01-09 DIAGNOSIS — Z09 SURGERY FOLLOW-UP EXAMINATION: Primary | ICD-10-CM

## 2019-01-09 DIAGNOSIS — Z30.41 ENCOUNTER FOR SURVEILLANCE OF CONTRACEPTIVE PILLS: ICD-10-CM

## 2019-01-09 PROCEDURE — 99024 PR POST-OP FOLLOW-UP VISIT: ICD-10-PCS | Mod: S$GLB,,, | Performed by: SURGERY

## 2019-01-09 PROCEDURE — 99999 PR PBB SHADOW E&M-EST. PATIENT-LVL III: ICD-10-PCS | Mod: PBBFAC,,, | Performed by: SURGERY

## 2019-01-09 PROCEDURE — 99024 POSTOP FOLLOW-UP VISIT: CPT | Mod: S$GLB,,, | Performed by: SURGERY

## 2019-01-09 PROCEDURE — 99999 PR PBB SHADOW E&M-EST. PATIENT-LVL III: CPT | Mod: PBBFAC,,, | Performed by: SURGERY

## 2019-01-09 RX ORDER — NORETHINDRONE ACETATE AND ETHINYL ESTRADIOL .02; 1 MG/1; MG/1
1 TABLET ORAL DAILY
Qty: 84 TABLET | Refills: 0 | Status: SHIPPED | OUTPATIENT
Start: 2019-01-09 | End: 2019-01-18 | Stop reason: SDUPTHER

## 2019-01-09 NOTE — PROGRESS NOTES
Subjective:      Roxanne Hardy is a 28 y.o. year old female who [facility] for follow up visit status post bilateral breast reduction on 11/1/18. Denies fever, chills, nausea, vomiting, or other systemic signs of infection. She states that she has had some itching over the past month in the inferior portions of the breast, which has since resolved.     Vitals:    01/09/19 0828   BP: 122/84   Pulse: 94   Temp: 98.6 °F (37 °C)        Review of patient's allergies indicates:   Allergen Reactions    Bee sting [allergen ext-venom-honey bee] Anaphylaxis       Current Outpatient Medications on File Prior to Visit   Medication Sig Dispense Refill    norethindrone-ethinyl estradiol (MICROGESTIN 1/20) 1-20 mg-mcg per tablet Take 1 tablet by mouth once daily. 84 tablet 3    (Magic mouthwash) 1:1:1 Benadryl 12.5mg/5ml liq, aluminum & magnesium hydroxide-simehticone (Maalox), lidocaine viscous 2% Swish and spit 5 mLs every 4 (four) hours as needed (sore throat). for mouth sores 100 mL 0    EPINEPHrine (EPIPEN 2-BUCK) 0.3 mg/0.3 mL AtIn Inject 0.3 mLs (0.3 mg total) into the muscle once as needed (anaphylaxis). 0.3 mL 3    methylPREDNISolone (MEDROL DOSEPACK) 4 mg tablet use as directed on package 21 tablet 0    norethindrone-ethinyl estradiol (MICROGESTIN FE 1/20, 28,) 1 mg-20 mcg (21)/75 mg (7) per tablet take 1 tablet by mouth once daily 84 tablet 3    oxyCODONE (OXY-IR) 5 mg Cap Take 1 capsule (5 mg total) by mouth every 4 (four) hours as needed for Pain. 31 capsule 0     No current facility-administered medications on file prior to visit.        Patient Active Problem List   Diagnosis    Chronic idiopathic urticaria    Bee sting allergy    Macromastia       [unfilled]    Social History     Socioeconomic History    Marital status: Single     Spouse name: Not on file    Number of children: Not on file    Years of education: Not on file    Highest education level: Not on file    Social Needs    Financial resource strain: Not on file    Food insecurity - worry: Not on file    Food insecurity - inability: Not on file    Transportation needs - medical: Not on file    Transportation needs - non-medical: Not on file   Occupational History    Not on file   Tobacco Use    Smoking status: Never Smoker    Smokeless tobacco: Never Used   Substance and Sexual Activity    Alcohol use: Yes     Alcohol/week: 2.4 oz     Types: 4 Glasses of wine per week    Drug use: No    Sexual activity: Yes     Partners: Male     Birth control/protection: OCP     Comment: Partner [email or web address]   Other Topics Concern    Not on file   Social History Narrative    Not on file           Review of Systems:   Constitutional: negative for fevers  Eyes: negative visual changes  ENT: negative for hearing loss  Respiratory: negative for dyspnea  Cardiovascular: negative for chest pain  Gastrointestinal: negative for abdominal pain  Genitourinary: negative for dysuria  Neurological: negative for headaches  Behavioral/Psych: negative for hallucinations  Endocrine: negative for temperature intolerance      Objective:     Physical Exam:  Vitals:    01/09/19 0828   BP: 122/84   Pulse: 94   Temp: 98.6 °F (37 °C)       WD WN NAD  VSS  Normal resp effort    Bilateral breast incisions have healed appropriately  No SOI  Breast appear symmetric  Wound opening complication previously documented has healed appropriately  She has full sensation to the nipples            Plan:   28 y.o. female status post bilateral breast reduction on 11/1/18  - Doing well, she has no complaints, she is happy with her results  - her wound opening previously followed has healed appropriately  - She is not interested in scar revision at this time  - Patient will call in May to book f/u appt    The patient was advised to call the clinic with any questions or concerns prior to their next visit.

## 2019-01-09 NOTE — TELEPHONE ENCOUNTER
Called pt and informed her that her annual isnt due until next week. Pt needs refill on ocp. Pt verbalized understanding.

## 2019-01-18 ENCOUNTER — OFFICE VISIT (OUTPATIENT)
Dept: OBSTETRICS AND GYNECOLOGY | Facility: CLINIC | Age: 29
End: 2019-01-18
Payer: COMMERCIAL

## 2019-01-18 VITALS
DIASTOLIC BLOOD PRESSURE: 62 MMHG | BODY MASS INDEX: 26.19 KG/M2 | WEIGHT: 162.94 LBS | HEIGHT: 66 IN | SYSTOLIC BLOOD PRESSURE: 118 MMHG

## 2019-01-18 DIAGNOSIS — Z01.419 ENCOUNTER FOR GYNECOLOGICAL EXAMINATION WITHOUT ABNORMAL FINDING: Primary | ICD-10-CM

## 2019-01-18 DIAGNOSIS — Z30.41 ENCOUNTER FOR SURVEILLANCE OF CONTRACEPTIVE PILLS: ICD-10-CM

## 2019-01-18 PROCEDURE — 99395 PREV VISIT EST AGE 18-39: CPT | Mod: S$GLB,,, | Performed by: OBSTETRICS & GYNECOLOGY

## 2019-01-18 PROCEDURE — 99395 PR PREVENTIVE VISIT,EST,18-39: ICD-10-PCS | Mod: S$GLB,,, | Performed by: OBSTETRICS & GYNECOLOGY

## 2019-01-18 PROCEDURE — 99999 PR PBB SHADOW E&M-EST. PATIENT-LVL II: ICD-10-PCS | Mod: PBBFAC,,, | Performed by: OBSTETRICS & GYNECOLOGY

## 2019-01-18 PROCEDURE — 99999 PR PBB SHADOW E&M-EST. PATIENT-LVL II: CPT | Mod: PBBFAC,,, | Performed by: OBSTETRICS & GYNECOLOGY

## 2019-01-18 RX ORDER — NORETHINDRONE ACETATE AND ETHINYL ESTRADIOL .02; 1 MG/1; MG/1
1 TABLET ORAL DAILY
Qty: 84 TABLET | Refills: 3 | Status: SHIPPED | OUTPATIENT
Start: 2019-01-18 | End: 2020-03-31 | Stop reason: SDUPTHER

## 2019-01-18 NOTE — PROGRESS NOTES
PT HERE FOR ANNUAL.  NEEDS REFILL OCP.    ROS:  GENERAL: No fever, chills, fatigability or weight loss.  VULVAR: No pain, no lesions and no itching.  VAGINAL: No relaxation, no itching, no discharge, no abnormal bleeding and no lesions.  ABDOMEN: No abdominal pain. Denies nausea. Denies vomiting. No diarrhea. No constipation  BREAST: Denies pain. No lumps. No discharge.  URINARY: No incontinence, no nocturia, no frequency and no dysuria.  CARDIOVASCULAR: No chest pain. No shortness of breath. No leg cramps.  NEUROLOGICAL: No headaches. No vision changes.  The remainder of the review of systems was negative.    PE:  General Appearance: overweight And Well developed. Well nourished. In no acute distress.  Vulva: Lesions: No.  Urethral Meatus: Normal size. Normal location. No lesions. No prolapse.  Urethra: No masses. No tenderness. No prolapse. No scarring.  Bladder: No masses. No tenderness.  Vagina: Mucosa NI:yes discharge no, atrophy no, cystocele no or rectocele no.  Cervix: Lesion: no  Stenotic: no Cervical motion tenderness: no  Uterus: Uterus size: 4 weeks. Support good. Uterus size: Normal  Adnexa: Masses: No Tenderness: No CDS Nodularity: No  Abdomen: overweight No masses. No tenderness.  Breasts: SUPERFICIAL EXAM. POST OP 2 MONTHS.  Neck: No thyroid enlargement. No thyroid masses.  Skin: Rashes: No      PROCEDURES:    PLAN:     DIAGNOSIS:  1. Encounter for gynecological examination without abnormal finding    2. Encounter for surveillance of contraceptive pills        MEDICATIONS & ORDERS:  Orders Placed This Encounter    norethindrone-ethinyl estradiol (MICROGESTIN 1/20) 1-20 mg-mcg per tablet       Patient was counseled today on the new ACS guidelines for cervical cytology screening as well as the current recommendations for breast cancer screening. She was counseled to follow up with her PCP for other routine health maintenance. Counseling session lasted approximately 10 minutes, and all her questions  were answered.         FOLLOW-UP: With me in 12 month

## 2019-05-14 ENCOUNTER — OFFICE VISIT (OUTPATIENT)
Dept: PLASTIC SURGERY | Facility: CLINIC | Age: 29
End: 2019-05-14
Payer: COMMERCIAL

## 2019-05-14 VITALS
HEART RATE: 81 BPM | DIASTOLIC BLOOD PRESSURE: 83 MMHG | HEIGHT: 66 IN | WEIGHT: 162.94 LBS | SYSTOLIC BLOOD PRESSURE: 123 MMHG | BODY MASS INDEX: 26.19 KG/M2

## 2019-05-14 DIAGNOSIS — Z98.890 S/P BILATERAL BREAST REDUCTION: Primary | ICD-10-CM

## 2019-05-14 PROCEDURE — 3008F PR BODY MASS INDEX (BMI) DOCUMENTED: ICD-10-PCS | Mod: CPTII,S$GLB,, | Performed by: PHYSICIAN ASSISTANT

## 2019-05-14 PROCEDURE — 99212 OFFICE O/P EST SF 10 MIN: CPT | Mod: S$GLB,,, | Performed by: PHYSICIAN ASSISTANT

## 2019-05-14 PROCEDURE — 99999 PR PBB SHADOW E&M-EST. PATIENT-LVL III: ICD-10-PCS | Mod: PBBFAC,,, | Performed by: PHYSICIAN ASSISTANT

## 2019-05-14 PROCEDURE — 99999 PR PBB SHADOW E&M-EST. PATIENT-LVL III: CPT | Mod: PBBFAC,,, | Performed by: PHYSICIAN ASSISTANT

## 2019-05-14 PROCEDURE — 99212 PR OFFICE/OUTPT VISIT, EST, LEVL II, 10-19 MIN: ICD-10-PCS | Mod: S$GLB,,, | Performed by: PHYSICIAN ASSISTANT

## 2019-05-14 PROCEDURE — 3008F BODY MASS INDEX DOCD: CPT | Mod: CPTII,S$GLB,, | Performed by: PHYSICIAN ASSISTANT

## 2019-05-14 NOTE — PROGRESS NOTES
Roxanne Hardy [facility] for a follow up visit status post bilateral breast reduction on 11/01/2018. She is doing well with no issues since her last visit. She is here today to discuss scar revision of B vertical limb. She was seen and evaluated by myself and Dr. Asa Mak   She denies fever, chills, nausea, vomiting, or any systemic signs of infection.     Review of patient's allergies indicates:   Allergen Reactions    Bee sting [allergen ext-venom-honey bee] Anaphylaxis     Current Outpatient Medications on File Prior to Visit   Medication Sig Dispense Refill    EPINEPHrine (EPIPEN 2-BUCK) 0.3 mg/0.3 mL AtIn Inject 0.3 mLs (0.3 mg total) into the muscle once as needed (anaphylaxis). 0.3 mL 3    norethindrone-ethinyl estradiol (MICROGESTIN 1/20) 1-20 mg-mcg per tablet Take 1 tablet by mouth once daily. 84 tablet 3     No current facility-administered medications on file prior to visit.      Patient Active Problem List   Diagnosis    Chronic idiopathic urticaria    Bee sting allergy    Macromastia     Past Surgical History:   Procedure Laterality Date    MAMMOPLASTY, REDUCTION, BILATERAL Bilateral 11/1/2018    Performed by Asa Mak MD at [facility] OR [address] [place] [address]     Social History     Socioeconomic History    Marital status: Single     Spouse name: Not on file    Number of children: Not on file    Years of education: Not on file    Highest education level: Not on file   Occupational History    Not on file   Social Needs    Financial resource strain: Not on file    Food insecurity:     Worry: Not on file     Inability: Not on file    Transportation needs:     Medical: Not on file     Non-medical: Not on file   Tobacco Use    Smoking status: Never Smoker    Smokeless tobacco: Never Used   Substance and Sexual Activity    Alcohol use: Yes     Alcohol/week: 2.4 oz     Types: 4 Glasses of wine per week    Drug use: No    Sexual activity: Yes      Partners: Male     Birth control/protection: OCP     Comment: Partner [email or web address]   Lifestyle    Physical activity:     Days per week: Not on file     Minutes per session: Not on file    Stress: Not on file   Relationships    Social connections:     Talks on phone: Not on file     Gets together: Not on file     Attends Mosque service: Not on file     Active member of club or organization: Not on file     Attends meetings of clubs or organizations: Not on file     Relationship status: Not on file   Other Topics Concern    Not on file   Social History Narrative    Not on file     PHYSICAL EXAMINATION  WD WN NAD  Normal resp effort  R breast - incision CDI, no erythema/drainage, nipple viable  L breast - incision CDI, no erythema/drainage, nipple viable    ASSESSMENT/PLAN  28 y.o. F s/p bilateral breast reduction  - Doing well, no issues. Pleased with outcome.   - Will need scar revision of B vertical limb under local anesthesia.   - Office staff to call and coordinate minor procedure. Patient voiced understanding and agreed to proceed with current plan.     All questions were answered. The patient was advised to call the clinic with any questions or concerns prior to their next visit.

## 2019-06-03 ENCOUNTER — PATIENT MESSAGE (OUTPATIENT)
Dept: PLASTIC SURGERY | Facility: CLINIC | Age: 29
End: 2019-06-03

## 2019-07-16 ENCOUNTER — PATIENT MESSAGE (OUTPATIENT)
Dept: PLASTIC SURGERY | Facility: CLINIC | Age: 29
End: 2019-07-16

## 2019-08-01 ENCOUNTER — PATIENT MESSAGE (OUTPATIENT)
Dept: PLASTIC SURGERY | Facility: CLINIC | Age: 29
End: 2019-08-01

## 2019-09-20 NOTE — PLAN OF CARE
Patient tolerating oral liquids without difficulty. No apparent s&s of distress noted at this time, no complaints voiced at this time. Discharge instructions reviewed with patient/family/friend with good verbal feedback received. Patient ready for discharge   CLL (chronic lymphocytic leukemia)

## 2019-09-27 ENCOUNTER — PATIENT MESSAGE (OUTPATIENT)
Dept: PLASTIC SURGERY | Facility: CLINIC | Age: 29
End: 2019-09-27

## 2019-10-17 ENCOUNTER — OFFICE VISIT (OUTPATIENT)
Dept: OPTOMETRY | Facility: CLINIC | Age: 29
End: 2019-10-17
Payer: COMMERCIAL

## 2019-10-17 DIAGNOSIS — H52.13 MYOPIA WITH ASTIGMATISM, BILATERAL: Primary | ICD-10-CM

## 2019-10-17 DIAGNOSIS — H52.203 MYOPIA WITH ASTIGMATISM, BILATERAL: Primary | ICD-10-CM

## 2019-10-17 DIAGNOSIS — Z46.0 FITTING AND ADJUSTMENT OF SPECTACLES AND CONTACT LENSES: ICD-10-CM

## 2019-10-17 PROCEDURE — 92014 PR EYE EXAM, EST PATIENT,COMPREHESV: ICD-10-PCS | Mod: S$GLB,,, | Performed by: OPTOMETRIST

## 2019-10-17 PROCEDURE — 92015 PR REFRACTION: ICD-10-PCS | Mod: S$GLB,,, | Performed by: OPTOMETRIST

## 2019-10-17 PROCEDURE — 92014 COMPRE OPH EXAM EST PT 1/>: CPT | Mod: S$GLB,,, | Performed by: OPTOMETRIST

## 2019-10-17 PROCEDURE — 92015 DETERMINE REFRACTIVE STATE: CPT | Mod: S$GLB,,, | Performed by: OPTOMETRIST

## 2019-10-17 PROCEDURE — 99999 PR PBB SHADOW E&M-EST. PATIENT-LVL II: CPT | Mod: PBBFAC,,, | Performed by: OPTOMETRIST

## 2019-10-17 PROCEDURE — 92310 PR CONTACT LENS FITTING (NO CHANGE): ICD-10-PCS | Mod: CSM,,, | Performed by: OPTOMETRIST

## 2019-10-17 PROCEDURE — 92310 CONTACT LENS FITTING OU: CPT | Mod: CSM,,, | Performed by: OPTOMETRIST

## 2019-10-17 PROCEDURE — 99999 PR PBB SHADOW E&M-EST. PATIENT-LVL II: ICD-10-PCS | Mod: PBBFAC,,, | Performed by: OPTOMETRIST

## 2019-10-18 NOTE — PROGRESS NOTES
HPI     LEXIS:05/2017  Glasses? no  Contacts? Yes   H/o eye surgery, injections or laser: no  H/o eye injury: no  Known eye conditions? no  Family h/o eye conditions? no  Eye gtts?no    Past month OS contact has not been fitting correctly   (-) Flashes (-) Floaters (-) Mucous   (-) Tearing (-) Itching (-) Burning   (-) Headaches (-) Eye Pain/discomfort (-) Irritation   (-) Redness (-) Double vision (-) Blurry vision    Diabetic? (-)  A1c?  (No results found for: HGBA1C)        Last edited by Carmenza Hickman on 10/17/2019  1:47 PM. (History)        ROS     Negative for: Constitutional, Gastrointestinal, Neurological, Skin,   Genitourinary, Musculoskeletal, HENT, Endocrine, Cardiovascular, Eyes,   Respiratory, Psychiatric, Allergic/Imm, Heme/Lymph    Last edited by Valdemar Cardona, OD on 10/17/2019  2:22 PM. (History)        Assessment /Plan     For exam results, see Encounter Report.    Myopia with astigmatism, bilateral      CLRx and SRx released to patient. Patient educated on lens options. Normal ocular health. RTC 1 year for routine exam.

## 2019-12-14 NOTE — TRANSFER OF CARE
"Anesthesia Transfer of Care Note    Patient: Roxanne Hardy    Procedure(s) Performed: Procedure(s) (LRB):  MAMMOPLASTY, REDUCTION, BILATERAL (Bilateral)    Patient location: PACU    Anesthesia Type: general    Transport from OR: Transported from OR on 6-10 L/min O2 by face mask with adequate spontaneous ventilation    Post pain: adequate analgesia    Post assessment: no apparent anesthetic complications    Post vital signs: stable    Level of consciousness: awake and alert    Nausea/Vomiting: no nausea/vomiting    Complications: none    Transfer of care protocol was followed      Last vitals:   Visit Vitals  /64 (BP Location: Left arm, Patient Position: Lying)   Pulse (!) 113   Temp 36.9 °C (98.4 °F) (Oral)   Resp 14   Ht 5' 6" (1.676 m)   Wt 74.8 kg (165 lb)   LMP 10/18/2018   SpO2 100%   Breastfeeding? No   BMI 26.63 kg/m²     " Family

## 2020-03-16 ENCOUNTER — OFFICE VISIT (OUTPATIENT)
Dept: INTERNAL MEDICINE | Facility: CLINIC | Age: 30
End: 2020-03-16
Payer: COMMERCIAL

## 2020-03-16 ENCOUNTER — LAB VISIT (OUTPATIENT)
Dept: LAB | Facility: HOSPITAL | Age: 30
End: 2020-03-16
Payer: COMMERCIAL

## 2020-03-16 VITALS
HEART RATE: 80 BPM | HEIGHT: 66 IN | DIASTOLIC BLOOD PRESSURE: 60 MMHG | SYSTOLIC BLOOD PRESSURE: 92 MMHG | WEIGHT: 164.88 LBS | BODY MASS INDEX: 26.5 KG/M2 | OXYGEN SATURATION: 97 % | TEMPERATURE: 98 F

## 2020-03-16 DIAGNOSIS — Z11.1 SCREENING-PULMONARY TB: ICD-10-CM

## 2020-03-16 DIAGNOSIS — E66.3 OVERWEIGHT (BMI 25.0-29.9): ICD-10-CM

## 2020-03-16 DIAGNOSIS — Z02.0 ENCOUNTER FOR SCHOOL HISTORY AND PHYSICAL EXAMINATION: Primary | ICD-10-CM

## 2020-03-16 PROCEDURE — 99395 PREV VISIT EST AGE 18-39: CPT | Mod: S$GLB,,, | Performed by: NURSE PRACTITIONER

## 2020-03-16 PROCEDURE — 99999 PR PBB SHADOW E&M-EST. PATIENT-LVL IV: CPT | Mod: PBBFAC,,, | Performed by: NURSE PRACTITIONER

## 2020-03-16 PROCEDURE — 99999 PR PBB SHADOW E&M-EST. PATIENT-LVL IV: ICD-10-PCS | Mod: PBBFAC,,, | Performed by: NURSE PRACTITIONER

## 2020-03-16 PROCEDURE — 86480 TB TEST CELL IMMUN MEASURE: CPT

## 2020-03-16 PROCEDURE — 36415 COLL VENOUS BLD VENIPUNCTURE: CPT

## 2020-03-16 PROCEDURE — 99395 PR PREVENTIVE VISIT,EST,18-39: ICD-10-PCS | Mod: S$GLB,,, | Performed by: NURSE PRACTITIONER

## 2020-03-16 NOTE — PROGRESS NOTES
Subjective:       Patient ID: Roxanne Hardy is a 29 y.o. female.    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 States she needs a Quantiferon Gold Tb screening test.    No complaints.    Review of Systems   Constitutional: Negative for activity change, appetite change and unexpected weight change.   HENT: Negative for dental problem, hearing loss, rhinorrhea and trouble swallowing.    Eyes: Negative for discharge and visual disturbance.   Respiratory: Negative for apnea, cough, chest tightness, shortness of breath and wheezing.    Cardiovascular: Negative for chest pain, palpitations and leg swelling.   Gastrointestinal: Negative for abdominal distention, abdominal pain, anal bleeding, blood in stool, constipation, diarrhea, nausea, rectal pain and vomiting.   Endocrine: Negative for cold intolerance, heat intolerance, polydipsia, polyphagia and polyuria.   Genitourinary: Negative for difficulty urinating, dysuria, hematuria, menstrual problem, pelvic pain and vaginal pain.   Musculoskeletal: Negative for arthralgias, joint swelling and neck pain.   Skin: Negative for color change.   Allergic/Immunologic: Negative for environmental allergies, food allergies and immunocompromised state.   Neurological: Negative for dizziness, speech difficulty, weakness and headaches.   Hematological: Negative for adenopathy. Does not bruise/bleed easily.   Psychiatric/Behavioral: Negative for agitation, behavioral problems, confusion, dysphoric mood, sleep disturbance and suicidal ideas.       Review of patient's allergies indicates:   Allergen Reactions    Bee sting [allergen ext-venom-honey bee] Anaphylaxis       Current Outpatient Medications:     EPINEPHrine (EPIPEN 2-BUCK) 0.3 mg/0.3 mL AtIn, Inject 0.3 mLs (0.3 mg total) into the muscle once as needed (anaphylaxis)., Disp: 0.3 mL, Rfl: 3    norethindrone-ethinyl estradiol (MICROGESTIN 1/20) 1-20 mg-mcg per tablet, Take 1 tablet by  mouth once daily., Disp: 84 tablet, Rfl: 3    Patient Active Problem List   Diagnosis    Chronic idiopathic urticaria    Bee sting allergy    Macromastia     Past Medical History:   Diagnosis Date    Bee sting allergy     Chronic idiopathic urticaria      Past Surgical History:   Procedure Laterality Date    REDUCTION OF BOTH BREASTS Bilateral 11/1/2018    Procedure: MAMMOPLASTY, REDUCTION, BILATERAL;  Surgeon: Asa Mak MD;  Location: [address];  Service: Plastics;  Laterality: Bilateral;     Social History     Socioeconomic History    Marital status: Single     Spouse name: Not on file    Number of children: Not on file    Years of education: Not on file    Highest education level: Not on file   Occupational History    Not on file   Social Needs    Financial resource strain: Not hard at all    Food insecurity:     Worry: Never true     Inability: Never true    Transportation needs:     Medical: No     Non-medical: No   Tobacco Use    Smoking status: Never Smoker    Smokeless tobacco: Never Used   Substance and Sexual Activity    Alcohol use: Yes     Alcohol/week: 4.0 standard drinks     Types: 4 Glasses of wine per week     Frequency: 2-3 times a week     Drinks per session: 3 or 4     Binge frequency: Less than monthly    Drug use: No    Sexual activity: Yes     Partners: Male     Birth control/protection: OCP     Comment: Partner [email or web address]   Lifestyle    Physical activity:     Days per week: 3 days     Minutes per session: 60 min    Stress: Not at all   Relationships    Social connections:     Talks on phone: Three times a week     Gets together: Twice a week     Attends Rastafarian service: Not on file     Active member of club or organization: No     Attends meetings of clubs or organizations: Never     Relationship status: Never    Other Topics Concern    Not on file   Social History Narrative    Not on file     Family History   Problem Relation Age of Onset  "   Hyperlipidemia Mother     Hypothyroidism Mother     Hyperlipidemia Father     Hypertension Father     Ovarian cancer Maternal Grandmother 70    Skin cancer Paternal Grandmother     Diabetes Paternal Grandmother     Prostate cancer Paternal Grandfather     Pancreatic cancer Paternal Grandfather 83    Breast cancer Neg Hx     Colon cancer Neg Hx     Stroke Neg Hx        Objective:       Vitals:    03/16/20 0834   BP: 92/60   Pulse: 80   Temp: 98 °F (36.7 °C)   SpO2: 97%   Weight: 74.8 kg (164 lb 14.5 oz)   Height: 5' 6" (1.676 m)   PainSc: 0-No pain       Body mass index is 26.62 kg/m².    Physical Exam   Constitutional: She is oriented to person, place, and time. Vital signs are normal. She appears well-developed and well-nourished.   overweight   HENT:   Head: Normocephalic.   Right Ear: Hearing, tympanic membrane, external ear and ear canal normal.   Left Ear: Hearing, tympanic membrane, external ear and ear canal normal.   Eyes: Pupils are equal, round, and reactive to light. Conjunctivae, EOM and lids are normal. Lids are everted and swept, no foreign bodies found.   Neck: Trachea normal, normal range of motion and full passive range of motion without pain. Neck supple. No JVD present. Carotid bruit is not present.   Cardiovascular: Normal rate, regular rhythm, S1 normal, S2 normal, normal heart sounds, intact distal pulses and normal pulses.   Pulmonary/Chest: Effort normal and breath sounds normal.   Abdominal: Soft. Normal appearance and bowel sounds are normal. There is no hepatosplenomegaly.   Musculoskeletal: Normal range of motion.   Neurological: She is alert and oriented to person, place, and time. She has normal strength and normal reflexes.   Skin: Skin is warm, dry and intact. Capillary refill takes less than 2 seconds.   Psychiatric: She has a normal mood and affect. Her speech is normal and behavior is normal. Judgment and thought content normal. Cognition and memory are normal. "   Nursing note and vitals reviewed.      Assessment:       1. Encounter for school history and physical examination    2. BMI 26.0-26.9,adult    3. Overweight (BMI 25.0-29.9)    4. Screening-pulmonary TB        Plan:       Roxanne was seen today for annual exam.    Diagnoses and all orders for this visit:    Encounter for school history and physical examination  PE form filled out for school, Exam completed    BMI 26.0-26.9,adult  BMI reviewed    Overweight (BMI 25.0-29.9)  BMI reviewed.    Diet and exercise to lose weight.    Screening-pulmonary TB  -     Quantiferon Gold TB; Future    Follow up in about 1 year (around 3/16/2021), or for annual or sooner as needed .

## 2020-03-16 NOTE — PATIENT INSTRUCTIONS
PE form filled out for school, Exam completed    Tb blood test pending as pt stated this is the test she needs

## 2020-03-17 LAB
GAMMA INTERFERON BACKGROUND BLD IA-ACNC: 0.04 IU/ML
M TB IFN-G CD4+ BCKGRND COR BLD-ACNC: 0.03 IU/ML
MITOGEN IGNF BCKGRD COR BLD-ACNC: 8.91 IU/ML
TB GOLD PLUS: NEGATIVE
TB2 - NIL: 0.02 IU/ML

## 2020-03-25 ENCOUNTER — TELEPHONE (OUTPATIENT)
Dept: OBSTETRICS AND GYNECOLOGY | Facility: CLINIC | Age: 30
End: 2020-03-25

## 2020-03-31 ENCOUNTER — PATIENT MESSAGE (OUTPATIENT)
Dept: PLASTIC SURGERY | Facility: CLINIC | Age: 30
End: 2020-03-31

## 2020-03-31 ENCOUNTER — PATIENT MESSAGE (OUTPATIENT)
Dept: OBSTETRICS AND GYNECOLOGY | Facility: CLINIC | Age: 30
End: 2020-03-31

## 2020-03-31 DIAGNOSIS — Z30.41 ENCOUNTER FOR SURVEILLANCE OF CONTRACEPTIVE PILLS: ICD-10-CM

## 2020-03-31 RX ORDER — NORETHINDRONE ACETATE AND ETHINYL ESTRADIOL .02; 1 MG/1; MG/1
1 TABLET ORAL DAILY
Qty: 84 TABLET | Refills: 1 | Status: SHIPPED | OUTPATIENT
Start: 2020-03-31 | End: 2020-05-26 | Stop reason: SDUPTHER

## 2020-04-21 DIAGNOSIS — Z01.84 ANTIBODY RESPONSE EXAMINATION: ICD-10-CM

## 2020-04-22 ENCOUNTER — LAB VISIT (OUTPATIENT)
Dept: LAB | Facility: OTHER | Age: 30
End: 2020-04-22
Attending: INTERNAL MEDICINE
Payer: COMMERCIAL

## 2020-04-22 DIAGNOSIS — Z01.84 ANTIBODY RESPONSE EXAMINATION: ICD-10-CM

## 2020-04-22 LAB — SARS-COV-2 IGG SERPL QL IA: NEGATIVE

## 2020-04-22 PROCEDURE — 36415 COLL VENOUS BLD VENIPUNCTURE: CPT

## 2020-04-22 PROCEDURE — 86769 SARS-COV-2 COVID-19 ANTIBODY: CPT

## 2020-05-26 ENCOUNTER — OFFICE VISIT (OUTPATIENT)
Dept: OBSTETRICS AND GYNECOLOGY | Facility: CLINIC | Age: 30
End: 2020-05-26
Payer: COMMERCIAL

## 2020-05-26 VITALS
SYSTOLIC BLOOD PRESSURE: 102 MMHG | WEIGHT: 167.56 LBS | DIASTOLIC BLOOD PRESSURE: 64 MMHG | BODY MASS INDEX: 27.04 KG/M2

## 2020-05-26 DIAGNOSIS — Z01.419 ENCOUNTER FOR GYNECOLOGICAL EXAMINATION WITHOUT ABNORMAL FINDING: Primary | ICD-10-CM

## 2020-05-26 DIAGNOSIS — Z30.41 ENCOUNTER FOR SURVEILLANCE OF CONTRACEPTIVE PILLS: ICD-10-CM

## 2020-05-26 DIAGNOSIS — Z12.4 PAP SMEAR FOR CERVICAL CANCER SCREENING: ICD-10-CM

## 2020-05-26 DIAGNOSIS — Z91.030 BEE STING ALLERGY: ICD-10-CM

## 2020-05-26 PROCEDURE — 99999 PR PBB SHADOW E&M-EST. PATIENT-LVL III: CPT | Mod: PBBFAC,,, | Performed by: NURSE PRACTITIONER

## 2020-05-26 PROCEDURE — 99395 PREV VISIT EST AGE 18-39: CPT | Mod: S$GLB,,, | Performed by: NURSE PRACTITIONER

## 2020-05-26 PROCEDURE — 99999 PR PBB SHADOW E&M-EST. PATIENT-LVL III: ICD-10-PCS | Mod: PBBFAC,,, | Performed by: NURSE PRACTITIONER

## 2020-05-26 PROCEDURE — 88175 CYTOPATH C/V AUTO FLUID REDO: CPT

## 2020-05-26 PROCEDURE — 99395 PR PREVENTIVE VISIT,EST,18-39: ICD-10-PCS | Mod: S$GLB,,, | Performed by: NURSE PRACTITIONER

## 2020-05-26 RX ORDER — NORETHINDRONE ACETATE AND ETHINYL ESTRADIOL .02; 1 MG/1; MG/1
1 TABLET ORAL DAILY
Qty: 84 TABLET | Refills: 3 | Status: SHIPPED | OUTPATIENT
Start: 2020-05-26 | End: 2021-05-27 | Stop reason: SDUPTHER

## 2020-05-26 RX ORDER — EPINEPHRINE 0.3 MG/.3ML
1 INJECTION SUBCUTANEOUS ONCE AS NEEDED
Qty: 2 EACH | Refills: 3 | Status: SHIPPED | OUTPATIENT
Start: 2020-05-26 | End: 2022-06-15 | Stop reason: SDUPTHER

## 2020-05-26 NOTE — PROGRESS NOTES
CC: Annual  HPI: Pt is a 30 y.o.  female who presents for routine annual exam. New patient to me. Last seen by Dr. Muñiz in 2019. She uses OCPs for contraception. She is happy with them and needs a refill today. She does not want STD screening. Requesting refill of epi pen to have at home-allergic to bee stings. Just started NP school, working in the [employer] at [employer] right now. Getting  in September.    FH:   Breast cancer: none  Colon cancer: none  Ovarian cancer: MGM, in her 70s at diagnosis    Flu vaccine: na   HPV vaccine:yes  Last pap smear: 2018 negative  Colonoscopy: na  DEXA: na  Mammogram: na  STD history: none  Birth control: OCPs    ROS:  GENERAL: Feeling well overall. Denies fever or chills.   SKIN: Denies rash or lesions.   HEAD: Denies head injury or headache.   NODES: Denies enlarged lymph nodes.   CHEST: Denies chest pain or shortness of breath.   CARDIOVASCULAR: Denies palpitations or left sided chest pain.   ABDOMEN: No abdominal pain, constipation, diarrhea, nausea, vomiting or rectal bleeding.   URINARY: No dysuria, hematuria, or burning on urination.  REPRODUCTIVE: See HPI.   BREASTS: Denies pain, lumps, or nipple discharge.   HEMATOLOGIC: No easy bruisability or excessive bleeding.   MUSCULOSKELETAL: Denies joint pain or swelling.   NEUROLOGIC: Denies syncope or weakness.   PSYCHIATRIC: Denies depression, anxiety or mood swings.    PE:   APPEARANCE: Well nourished, well developed, White female in no acute distress.  NODES: no cervical, supraclavicular, or inguinal lymphadenopathy  BREASTS: Symmetrical, no skin changes or visible lesions. No palpable masses, nipple discharge or adenopathy bilaterally.  ABDOMEN: Soft. No tenderness or masses. No distention. No hernias palpated. No CVA tenderness.  VULVA: No lesions. Normal external female genitalia.  URETHRAL MEATUS: Normal size and location, no lesions, no prolapse.  URETHRA: No masses, tenderness, or prolapse.  VAGINA: Moist. No lesions or  lacerations noted. No abnormal discharge present. No odor present.   CERVIX: No lesions or discharge. No cervical motion tenderness. FRIABLE   UTERUS: Normal size, regular shape, mobile, non-tender.  ADNEXA: No tenderness. No fullness or masses palpated in the adnexal regions.   ANUS PERINEUM: Normal.      Diagnosis:  1. Encounter for gynecological examination without abnormal finding    2. Encounter for surveillance of contraceptive pills    3. Bee sting allergy    4. Pap smear for cervical cancer screening        Plan:     Orders Placed This Encounter    Liquid-Based Pap Smear, Screening    norethindrone-ethinyl estradiol (MICROGESTIN 1/20) 1-20 mg-mcg per tablet    EPINEPHrine (EPIPEN 2-BUCK) 0.3 mg/0.3 mL AtIn     1. OCPs and Epipen refilled  2. Pap/HPV    Patient was counseled today on the new ACS guidelines for cervical cytology screening as well as the current recommendations for breast cancer screening. She was counseled to follow up with her PCP for other routine health maintenance. Counseling session lasted approximately 10 minutes, and all her questions were answered.    Follow-up with me in 1 year for routine exam; pap in 3 years.

## 2020-05-28 LAB
FINAL PATHOLOGIC DIAGNOSIS: NORMAL
Lab: NORMAL

## 2020-08-18 ENCOUNTER — TELEPHONE (OUTPATIENT)
Dept: OPTOMETRY | Facility: CLINIC | Age: 30
End: 2020-08-18

## 2020-08-18 NOTE — TELEPHONE ENCOUNTER
Redness and irritation has been going on for the past month. Still having irritation after using new contacts. Scheduled pt to be seen for symptoms 08/21 @ [address]

## 2020-08-21 ENCOUNTER — OFFICE VISIT (OUTPATIENT)
Dept: OPTOMETRY | Facility: CLINIC | Age: 30
End: 2020-08-21
Payer: COMMERCIAL

## 2020-08-21 DIAGNOSIS — H10.89 GPC (GIANT PAPILLARY CONJUNCTIVITIS): Primary | ICD-10-CM

## 2020-08-21 PROCEDURE — 92012 PR EYE EXAM, EST PATIENT,INTERMED: ICD-10-PCS | Mod: S$GLB,,, | Performed by: OPTOMETRIST

## 2020-08-21 PROCEDURE — 99999 PR PBB SHADOW E&M-EST. PATIENT-LVL III: ICD-10-PCS | Mod: PBBFAC,,, | Performed by: OPTOMETRIST

## 2020-08-21 PROCEDURE — 99999 PR PBB SHADOW E&M-EST. PATIENT-LVL III: CPT | Mod: PBBFAC,,, | Performed by: OPTOMETRIST

## 2020-08-21 PROCEDURE — 92012 INTRM OPH EXAM EST PATIENT: CPT | Mod: S$GLB,,, | Performed by: OPTOMETRIST

## 2020-09-18 ENCOUNTER — OFFICE VISIT (OUTPATIENT)
Dept: OPTOMETRY | Facility: CLINIC | Age: 30
End: 2020-09-18
Payer: COMMERCIAL

## 2020-09-18 DIAGNOSIS — H10.89 GPC (GIANT PAPILLARY CONJUNCTIVITIS): Primary | ICD-10-CM

## 2020-09-18 PROCEDURE — 92012 PR EYE EXAM, EST PATIENT,INTERMED: ICD-10-PCS | Mod: S$GLB,,, | Performed by: OPTOMETRIST

## 2020-09-18 PROCEDURE — 99999 PR PBB SHADOW E&M-EST. PATIENT-LVL II: ICD-10-PCS | Mod: PBBFAC,,, | Performed by: OPTOMETRIST

## 2020-09-18 PROCEDURE — 92012 INTRM OPH EXAM EST PATIENT: CPT | Mod: S$GLB,,, | Performed by: OPTOMETRIST

## 2020-09-18 PROCEDURE — 99999 PR PBB SHADOW E&M-EST. PATIENT-LVL II: CPT | Mod: PBBFAC,,, | Performed by: OPTOMETRIST

## 2020-09-18 NOTE — PROGRESS NOTES
HPI     LEXIS: 08/21/2020  Chief complaint (CC): follow up IOP/ GPC  Glasses? YES  Contacts? NO  H/o eye surgery, injections or laser: NO  H/o eye injury: NO  Known eye conditions? NO  Family h/o eye conditions? NO  Eye gtts? PF QD OU today is last day.      (-) Flashes (-)  Floaters (-) Mucous   (-)  Tearing (-) Itching (-) Burning   (-) Headaches (-) Eye Pain/discomfort (-) Irritation   (-)  Redness (-) Double vision (-) Blurry vision    Diabetic? -  A1c? NO      Last edited by Valdemar [email or web address], OD on 9/18/2020  9:11 AM. (History)            Assessment /Plan     For exam results, see Encounter Report.    GPC (giant papillary conjunctivitis)      Finish Pred Forte QD today.   Resume CL wear  Consider dailies.   RTC 1 mo Routine

## 2020-12-17 ENCOUNTER — IMMUNIZATION (OUTPATIENT)
Dept: INTERNAL MEDICINE | Facility: CLINIC | Age: 30
End: 2020-12-17
Payer: COMMERCIAL

## 2020-12-17 DIAGNOSIS — Z23 NEED FOR VACCINATION: ICD-10-CM

## 2020-12-17 PROCEDURE — 0001A COVID-19, MRNA, LNP-S, PF, 30 MCG/0.3 ML DOSE VACCINE: ICD-10-PCS | Mod: CV19,,, | Performed by: INTERNAL MEDICINE

## 2020-12-17 PROCEDURE — 0001A COVID-19, MRNA, LNP-S, PF, 30 MCG/0.3 ML DOSE VACCINE: CPT | Mod: CV19,,, | Performed by: INTERNAL MEDICINE

## 2020-12-17 PROCEDURE — 91300 COVID-19, MRNA, LNP-S, PF, 30 MCG/0.3 ML DOSE VACCINE: CPT | Mod: ,,, | Performed by: INTERNAL MEDICINE

## 2020-12-17 PROCEDURE — 91300 COVID-19, MRNA, LNP-S, PF, 30 MCG/0.3 ML DOSE VACCINE: ICD-10-PCS | Mod: ,,, | Performed by: INTERNAL MEDICINE

## 2021-01-07 ENCOUNTER — IMMUNIZATION (OUTPATIENT)
Dept: INTERNAL MEDICINE | Facility: CLINIC | Age: 31
End: 2021-01-07
Payer: COMMERCIAL

## 2021-01-07 DIAGNOSIS — Z23 NEED FOR VACCINATION: ICD-10-CM

## 2021-01-07 PROCEDURE — 0002A COVID-19, MRNA, LNP-S, PF, 30 MCG/0.3 ML DOSE VACCINE: CPT | Mod: PBBFAC | Performed by: INTERNAL MEDICINE

## 2021-01-07 PROCEDURE — 91300 COVID-19, MRNA, LNP-S, PF, 30 MCG/0.3 ML DOSE VACCINE: CPT | Mod: PBBFAC | Performed by: INTERNAL MEDICINE

## 2021-01-27 ENCOUNTER — PATIENT MESSAGE (OUTPATIENT)
Dept: INTERNAL MEDICINE | Facility: CLINIC | Age: 31
End: 2021-01-27

## 2021-01-27 ENCOUNTER — OFFICE VISIT (OUTPATIENT)
Dept: INTERNAL MEDICINE | Facility: CLINIC | Age: 31
End: 2021-01-27
Payer: COMMERCIAL

## 2021-01-27 VITALS
SYSTOLIC BLOOD PRESSURE: 108 MMHG | HEART RATE: 82 BPM | BODY MASS INDEX: 29 KG/M2 | OXYGEN SATURATION: 98 % | DIASTOLIC BLOOD PRESSURE: 70 MMHG | WEIGHT: 179.69 LBS

## 2021-01-27 DIAGNOSIS — R43.9 PROBLEMS WITH SMELL AND TASTE: ICD-10-CM

## 2021-01-27 DIAGNOSIS — J02.9 SORE THROAT: Primary | ICD-10-CM

## 2021-01-27 DIAGNOSIS — R68.83 CHILLS: ICD-10-CM

## 2021-01-27 PROCEDURE — 3008F BODY MASS INDEX DOCD: CPT | Mod: CPTII,S$GLB,, | Performed by: INTERNAL MEDICINE

## 2021-01-27 PROCEDURE — 99999 PR PBB SHADOW E&M-EST. PATIENT-LVL III: CPT | Mod: PBBFAC,,, | Performed by: INTERNAL MEDICINE

## 2021-01-27 PROCEDURE — 99213 PR OFFICE/OUTPT VISIT, EST, LEVL III, 20-29 MIN: ICD-10-PCS | Mod: S$GLB,,, | Performed by: INTERNAL MEDICINE

## 2021-01-27 PROCEDURE — 99213 OFFICE O/P EST LOW 20 MIN: CPT | Mod: S$GLB,,, | Performed by: INTERNAL MEDICINE

## 2021-01-27 PROCEDURE — 1125F PR PAIN SEVERITY QUANTIFIED, PAIN PRESENT: ICD-10-PCS | Mod: S$GLB,,, | Performed by: INTERNAL MEDICINE

## 2021-01-27 PROCEDURE — 99999 PR PBB SHADOW E&M-EST. PATIENT-LVL III: ICD-10-PCS | Mod: PBBFAC,,, | Performed by: INTERNAL MEDICINE

## 2021-01-27 PROCEDURE — U0003 INFECTIOUS AGENT DETECTION BY NUCLEIC ACID (DNA OR RNA); SEVERE ACUTE RESPIRATORY SYNDROME CORONAVIRUS 2 (SARS-COV-2) (CORONAVIRUS DISEASE [COVID-19]), AMPLIFIED PROBE TECHNIQUE, MAKING USE OF HIGH THROUGHPUT TECHNOLOGIES AS DESCRIBED BY CMS-2020-01-R: HCPCS

## 2021-01-27 PROCEDURE — 1125F AMNT PAIN NOTED PAIN PRSNT: CPT | Mod: S$GLB,,, | Performed by: INTERNAL MEDICINE

## 2021-01-27 PROCEDURE — 3008F PR BODY MASS INDEX (BMI) DOCUMENTED: ICD-10-PCS | Mod: CPTII,S$GLB,, | Performed by: INTERNAL MEDICINE

## 2021-01-27 RX ORDER — AMOXICILLIN 875 MG/1
875 TABLET, FILM COATED ORAL EVERY 12 HOURS
Qty: 20 TABLET | Refills: 0 | Status: SHIPPED | OUTPATIENT
Start: 2021-01-27 | End: 2021-02-06

## 2021-01-28 LAB — SARS-COV-2 RNA RESP QL NAA+PROBE: NOT DETECTED

## 2021-02-22 ENCOUNTER — PATIENT MESSAGE (OUTPATIENT)
Dept: GASTROENTEROLOGY | Facility: CLINIC | Age: 31
End: 2021-02-22

## 2021-02-23 ENCOUNTER — OFFICE VISIT (OUTPATIENT)
Dept: OPTOMETRY | Facility: CLINIC | Age: 31
End: 2021-02-23
Payer: COMMERCIAL

## 2021-02-23 DIAGNOSIS — Z46.0 FITTING AND ADJUSTMENT OF SPECTACLES AND CONTACT LENSES: ICD-10-CM

## 2021-02-23 DIAGNOSIS — Z04.9 DISEASE RULED OUT AFTER EXAMINATION: ICD-10-CM

## 2021-02-23 DIAGNOSIS — H52.203 MYOPIA WITH ASTIGMATISM, BILATERAL: Primary | ICD-10-CM

## 2021-02-23 DIAGNOSIS — H52.13 MYOPIA WITH ASTIGMATISM, BILATERAL: Primary | ICD-10-CM

## 2021-02-23 DIAGNOSIS — H10.89 GPC (GIANT PAPILLARY CONJUNCTIVITIS): ICD-10-CM

## 2021-02-23 DIAGNOSIS — Z46.0 FITTING AND ADJUSTMENT OF SPECTACLES AND CONTACT LENSES: Primary | ICD-10-CM

## 2021-02-23 PROCEDURE — 92310 PR CONTACT LENS FITTING (NO CHANGE): ICD-10-PCS | Mod: ,,, | Performed by: OPTOMETRIST

## 2021-02-23 PROCEDURE — 92014 COMPRE OPH EXAM EST PT 1/>: CPT | Mod: S$GLB,,, | Performed by: OPTOMETRIST

## 2021-02-23 PROCEDURE — 99999 PR PBB SHADOW E&M-EST. PATIENT-LVL II: CPT | Mod: PBBFAC,,, | Performed by: OPTOMETRIST

## 2021-02-23 PROCEDURE — 92014 PR EYE EXAM, EST PATIENT,COMPREHESV: ICD-10-PCS | Mod: S$GLB,,, | Performed by: OPTOMETRIST

## 2021-02-23 PROCEDURE — 92015 PR REFRACTION: ICD-10-PCS | Mod: S$GLB,,, | Performed by: OPTOMETRIST

## 2021-02-23 PROCEDURE — 92310 CONTACT LENS FITTING OU: CPT | Mod: ,,, | Performed by: OPTOMETRIST

## 2021-02-23 PROCEDURE — 99999 PR PBB SHADOW E&M-EST. PATIENT-LVL II: ICD-10-PCS | Mod: PBBFAC,,, | Performed by: OPTOMETRIST

## 2021-02-23 PROCEDURE — 92015 DETERMINE REFRACTIVE STATE: CPT | Mod: S$GLB,,, | Performed by: OPTOMETRIST

## 2021-03-02 ENCOUNTER — OFFICE VISIT (OUTPATIENT)
Dept: GASTROENTEROLOGY | Facility: CLINIC | Age: 31
End: 2021-03-02
Payer: COMMERCIAL

## 2021-03-02 VITALS — WEIGHT: 170 LBS | HEIGHT: 66 IN | BODY MASS INDEX: 27.32 KG/M2

## 2021-03-02 DIAGNOSIS — R19.7 DIARRHEA IN ADULT PATIENT: ICD-10-CM

## 2021-03-02 DIAGNOSIS — K92.1 BLOOD IN STOOL: Primary | ICD-10-CM

## 2021-03-02 DIAGNOSIS — K59.09 CONSTIPATION, CHRONIC: ICD-10-CM

## 2021-03-02 DIAGNOSIS — K92.1 HEMATOCHEZIA: ICD-10-CM

## 2021-03-02 PROCEDURE — 3008F PR BODY MASS INDEX (BMI) DOCUMENTED: ICD-10-PCS | Mod: CPTII,,, | Performed by: INTERNAL MEDICINE

## 2021-03-02 PROCEDURE — 1126F PR PAIN SEVERITY QUANTIFIED, NO PAIN PRESENT: ICD-10-PCS | Mod: ,,, | Performed by: INTERNAL MEDICINE

## 2021-03-02 PROCEDURE — 3008F BODY MASS INDEX DOCD: CPT | Mod: CPTII,,, | Performed by: INTERNAL MEDICINE

## 2021-03-02 PROCEDURE — 99204 OFFICE O/P NEW MOD 45 MIN: CPT | Mod: 95,,, | Performed by: INTERNAL MEDICINE

## 2021-03-02 PROCEDURE — 99204 PR OFFICE/OUTPT VISIT, NEW, LEVL IV, 45-59 MIN: ICD-10-PCS | Mod: 95,,, | Performed by: INTERNAL MEDICINE

## 2021-03-02 PROCEDURE — 1126F AMNT PAIN NOTED NONE PRSNT: CPT | Mod: ,,, | Performed by: INTERNAL MEDICINE

## 2021-03-03 ENCOUNTER — PATIENT MESSAGE (OUTPATIENT)
Dept: GASTROENTEROLOGY | Facility: CLINIC | Age: 31
End: 2021-03-03

## 2021-03-03 ENCOUNTER — LAB VISIT (OUTPATIENT)
Dept: LAB | Facility: OTHER | Age: 31
End: 2021-03-03
Attending: INTERNAL MEDICINE
Payer: COMMERCIAL

## 2021-03-03 DIAGNOSIS — K92.1 BLOOD IN STOOL: ICD-10-CM

## 2021-03-03 DIAGNOSIS — K59.09 CONSTIPATION, CHRONIC: ICD-10-CM

## 2021-03-03 DIAGNOSIS — R19.7 DIARRHEA IN ADULT PATIENT: ICD-10-CM

## 2021-03-03 DIAGNOSIS — K92.1 HEMATOCHEZIA: ICD-10-CM

## 2021-03-03 LAB
25(OH)D3+25(OH)D2 SERPL-MCNC: 21 NG/ML (ref 30–96)
ALBUMIN SERPL BCP-MCNC: 3.8 G/DL (ref 3.5–5.2)
ALP SERPL-CCNC: 84 U/L (ref 55–135)
ALT SERPL W/O P-5'-P-CCNC: 19 U/L (ref 10–44)
ANION GAP SERPL CALC-SCNC: 8 MMOL/L (ref 8–16)
AST SERPL-CCNC: 21 U/L (ref 10–40)
BASOPHILS # BLD AUTO: 0.05 K/UL (ref 0–0.2)
BASOPHILS NFR BLD: 0.6 % (ref 0–1.9)
BILIRUB SERPL-MCNC: 0.5 MG/DL (ref 0.1–1)
BUN SERPL-MCNC: 12 MG/DL (ref 6–20)
CALCIUM SERPL-MCNC: 9.8 MG/DL (ref 8.7–10.5)
CHLORIDE SERPL-SCNC: 105 MMOL/L (ref 95–110)
CO2 SERPL-SCNC: 26 MMOL/L (ref 23–29)
CREAT SERPL-MCNC: 0.7 MG/DL (ref 0.5–1.4)
CRP SERPL-MCNC: 7.2 MG/L (ref 0–8.2)
DIFFERENTIAL METHOD: NORMAL
EOSINOPHIL # BLD AUTO: 0.1 K/UL (ref 0–0.5)
EOSINOPHIL NFR BLD: 1.5 % (ref 0–8)
ERYTHROCYTE [DISTWIDTH] IN BLOOD BY AUTOMATED COUNT: 12.7 % (ref 11.5–14.5)
EST. GFR  (AFRICAN AMERICAN): >60 ML/MIN/1.73 M^2
EST. GFR  (NON AFRICAN AMERICAN): >60 ML/MIN/1.73 M^2
GLUCOSE SERPL-MCNC: 95 MG/DL (ref 70–110)
HCG INTACT+B SERPL-ACNC: <1.2 MIU/ML
HCT VFR BLD AUTO: 46.5 % (ref 37–48.5)
HGB BLD-MCNC: 15.4 G/DL (ref 12–16)
IMM GRANULOCYTES # BLD AUTO: 0.03 K/UL (ref 0–0.04)
IMM GRANULOCYTES NFR BLD AUTO: 0.4 % (ref 0–0.5)
LIPASE SERPL-CCNC: 39 U/L (ref 4–60)
LYMPHOCYTES # BLD AUTO: 2.7 K/UL (ref 1–4.8)
LYMPHOCYTES NFR BLD: 31.1 % (ref 18–48)
MCH RBC QN AUTO: 29.3 PG (ref 27–31)
MCHC RBC AUTO-ENTMCNC: 33.1 G/DL (ref 32–36)
MCV RBC AUTO: 88 FL (ref 82–98)
MONOCYTES # BLD AUTO: 0.7 K/UL (ref 0.3–1)
MONOCYTES NFR BLD: 8.1 % (ref 4–15)
NEUTROPHILS # BLD AUTO: 5 K/UL (ref 1.8–7.7)
NEUTROPHILS NFR BLD: 58.3 % (ref 38–73)
NRBC BLD-RTO: 0 /100 WBC
PLATELET # BLD AUTO: 273 K/UL (ref 150–350)
PMV BLD AUTO: 9.4 FL (ref 9.2–12.9)
POTASSIUM SERPL-SCNC: 3.9 MMOL/L (ref 3.5–5.1)
PROT SERPL-MCNC: 7.4 G/DL (ref 6–8.4)
RBC # BLD AUTO: 5.26 M/UL (ref 4–5.4)
SODIUM SERPL-SCNC: 139 MMOL/L (ref 136–145)
TSH SERPL DL<=0.005 MIU/L-ACNC: 1.48 UIU/ML (ref 0.4–4)
WBC # BLD AUTO: 8.54 K/UL (ref 3.9–12.7)

## 2021-03-03 PROCEDURE — 85025 COMPLETE CBC W/AUTO DIFF WBC: CPT

## 2021-03-03 PROCEDURE — 86706 HEP B SURFACE ANTIBODY: CPT | Performed by: INTERNAL MEDICINE

## 2021-03-03 PROCEDURE — 84443 ASSAY THYROID STIM HORMONE: CPT

## 2021-03-03 PROCEDURE — 83540 ASSAY OF IRON: CPT | Performed by: INTERNAL MEDICINE

## 2021-03-03 PROCEDURE — 82306 VITAMIN D 25 HYDROXY: CPT | Performed by: INTERNAL MEDICINE

## 2021-03-03 PROCEDURE — 84702 CHORIONIC GONADOTROPIN TEST: CPT

## 2021-03-03 PROCEDURE — 82784 ASSAY IGA/IGD/IGG/IGM EACH: CPT | Performed by: INTERNAL MEDICINE

## 2021-03-03 PROCEDURE — 87207 SMEAR SPECIAL STAIN: CPT | Performed by: INTERNAL MEDICINE

## 2021-03-03 PROCEDURE — 86140 C-REACTIVE PROTEIN: CPT

## 2021-03-03 PROCEDURE — 87798 DETECT AGENT NOS DNA AMP: CPT | Performed by: INTERNAL MEDICINE

## 2021-03-03 PROCEDURE — 87015 SPECIMEN INFECT AGNT CONCNTJ: CPT | Performed by: INTERNAL MEDICINE

## 2021-03-03 PROCEDURE — 82728 ASSAY OF FERRITIN: CPT | Performed by: INTERNAL MEDICINE

## 2021-03-03 PROCEDURE — 80053 COMPREHEN METABOLIC PANEL: CPT

## 2021-03-03 PROCEDURE — 80074 ACUTE HEPATITIS PANEL: CPT | Performed by: INTERNAL MEDICINE

## 2021-03-03 PROCEDURE — 86790 VIRUS ANTIBODY NOS: CPT | Performed by: INTERNAL MEDICINE

## 2021-03-03 PROCEDURE — 86753 PROTOZOA ANTIBODY NOS: CPT | Performed by: INTERNAL MEDICINE

## 2021-03-03 PROCEDURE — 86682 HELMINTH ANTIBODY: CPT | Performed by: INTERNAL MEDICINE

## 2021-03-03 PROCEDURE — 83690 ASSAY OF LIPASE: CPT

## 2021-03-03 PROCEDURE — 83516 IMMUNOASSAY NONANTIBODY: CPT | Performed by: INTERNAL MEDICINE

## 2021-03-04 ENCOUNTER — TELEPHONE (OUTPATIENT)
Dept: GASTROENTEROLOGY | Facility: CLINIC | Age: 31
End: 2021-03-04

## 2021-03-04 ENCOUNTER — PATIENT MESSAGE (OUTPATIENT)
Dept: GASTROENTEROLOGY | Facility: CLINIC | Age: 31
End: 2021-03-04

## 2021-03-04 DIAGNOSIS — E55.9 VITAMIN D INSUFFICIENCY: Primary | ICD-10-CM

## 2021-03-04 LAB
E HISTOLYT AB SER IA-ACNC: NEGATIVE
FERRITIN SERPL-MCNC: 26 NG/ML (ref 20–300)
IGA SERPL-MCNC: 367 MG/DL (ref 40–350)
IRON SERPL-MCNC: 102 UG/DL (ref 30–160)
SATURATED IRON: 24 % (ref 20–50)
TOTAL IRON BINDING CAPACITY: 426 UG/DL (ref 250–450)
TRANSFERRIN SERPL-MCNC: 288 MG/DL (ref 200–375)

## 2021-03-04 RX ORDER — ACETAMINOPHEN 500 MG
1 TABLET ORAL DAILY
Qty: 90 CAPSULE | Refills: 3 | Status: SHIPPED | OUTPATIENT
Start: 2021-03-04 | End: 2022-03-04

## 2021-03-04 RX ORDER — ERGOCALCIFEROL 1.25 MG/1
50000 CAPSULE ORAL
Qty: 12 CAPSULE | Refills: 0 | Status: SHIPPED | OUTPATIENT
Start: 2021-03-04 | End: 2021-03-14 | Stop reason: SDUPTHER

## 2021-03-05 LAB
HAV IGM SERPL QL IA: NEGATIVE
HBV CORE IGM SERPL QL IA: NEGATIVE
HBV SURFACE AB SER QL IA: POSITIVE
HBV SURFACE AB SERPL IA-ACNC: 25 MIU/ML
HBV SURFACE AG SERPL QL IA: NEGATIVE
HCV AB SERPL QL IA: NEGATIVE
HEPATITIS A ANTIBODY, IGG: NEGATIVE
STRONGYLOIDES ANTIBODY IGG: NEGATIVE
TTG IGA SER-ACNC: 7 UNITS

## 2021-03-06 LAB — CYCLOSPORA STL SAFRANIN STN: NORMAL

## 2021-03-07 LAB
ENCEPHALITOZOON BIENEUSI: NEGATIVE
ENCEPHALITOZOON SPECIES: NEGATIVE
MICROSPORIDIA SPECIMEN SOURCE: NORMAL

## 2021-03-10 ENCOUNTER — TELEPHONE (OUTPATIENT)
Dept: GASTROENTEROLOGY | Facility: CLINIC | Age: 31
End: 2021-03-10

## 2021-03-10 ENCOUNTER — PATIENT MESSAGE (OUTPATIENT)
Dept: ORTHOPEDICS | Facility: CLINIC | Age: 31
End: 2021-03-10

## 2021-03-10 ENCOUNTER — HOSPITAL ENCOUNTER (OUTPATIENT)
Dept: RADIOLOGY | Facility: HOSPITAL | Age: 31
Discharge: HOME | End: 2021-03-10
Attending: PHYSICIAN ASSISTANT
Payer: COMMERCIAL

## 2021-03-10 ENCOUNTER — OFFICE VISIT (OUTPATIENT)
Dept: INTERNAL MEDICINE | Facility: CLINIC | Age: 31
End: 2021-03-10
Payer: COMMERCIAL

## 2021-03-10 VITALS
SYSTOLIC BLOOD PRESSURE: 106 MMHG | TEMPERATURE: 98 F | WEIGHT: 175.06 LBS | HEIGHT: 66 IN | DIASTOLIC BLOOD PRESSURE: 80 MMHG | BODY MASS INDEX: 28.14 KG/M2 | OXYGEN SATURATION: 99 % | RESPIRATION RATE: 18 BRPM | HEART RATE: 87 BPM

## 2021-03-10 DIAGNOSIS — M25.531 RIGHT WRIST PAIN: ICD-10-CM

## 2021-03-10 DIAGNOSIS — R93.89 ABNORMAL X-RAY: ICD-10-CM

## 2021-03-10 DIAGNOSIS — Z12.11 SPECIAL SCREENING FOR MALIGNANT NEOPLASMS, COLON: Primary | ICD-10-CM

## 2021-03-10 DIAGNOSIS — M25.531 RIGHT WRIST PAIN: Primary | ICD-10-CM
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
## 2021-03-10 DIAGNOSIS — Z01.818 PRE-OP TESTING: ICD-10-CM

## 2021-03-10 PROCEDURE — 99213 PR OFFICE/OUTPT VISIT, EST, LEVL III, 20-29 MIN: ICD-10-PCS | Mod: S$GLB,,, | Performed by: PHYSICIAN ASSISTANT

## 2021-03-10 PROCEDURE — 73110 X-RAY EXAM OF WRIST: CPT | Mod: TC,RT

## 2021-03-10 PROCEDURE — 73110 XR WRIST COMPLETE 3 VIEWS RIGHT: ICD-10-PCS | Mod: 26,RT,, | Performed by: RADIOLOGY

## 2021-03-10 PROCEDURE — 99999 PR PBB SHADOW E&M-EST. PATIENT-LVL V: CPT | Mod: PBBFAC,,, | Performed by: PHYSICIAN ASSISTANT

## 2021-03-10 PROCEDURE — 99213 OFFICE O/P EST LOW 20 MIN: CPT | Mod: S$GLB,,, | Performed by: PHYSICIAN ASSISTANT

## 2021-03-10 PROCEDURE — 73110 X-RAY EXAM OF WRIST: CPT | Mod: 26,RT,, | Performed by: RADIOLOGY

## 2021-03-10 PROCEDURE — 1125F PR PAIN SEVERITY QUANTIFIED, PAIN PRESENT: ICD-10-PCS | Mod: S$GLB,,, | Performed by: PHYSICIAN ASSISTANT

## 2021-03-10 PROCEDURE — 1125F AMNT PAIN NOTED PAIN PRSNT: CPT | Mod: S$GLB,,, | Performed by: PHYSICIAN ASSISTANT

## 2021-03-10 PROCEDURE — 3008F BODY MASS INDEX DOCD: CPT | Mod: CPTII,S$GLB,, | Performed by: PHYSICIAN ASSISTANT

## 2021-03-10 PROCEDURE — 99999 PR PBB SHADOW E&M-EST. PATIENT-LVL V: ICD-10-PCS | Mod: PBBFAC,,, | Performed by: PHYSICIAN ASSISTANT

## 2021-03-10 PROCEDURE — 3008F PR BODY MASS INDEX (BMI) DOCUMENTED: ICD-10-PCS | Mod: CPTII,S$GLB,, | Performed by: PHYSICIAN ASSISTANT

## 2021-03-10 RX ORDER — SODIUM, POTASSIUM,MAG SULFATES 17.5-3.13G
1 SOLUTION, RECONSTITUTED, ORAL ORAL DAILY
Qty: 1 KIT | Refills: 0 | Status: SHIPPED | OUTPATIENT
Start: 2021-03-10 | End: 2021-03-12

## 2021-03-11 ENCOUNTER — TELEPHONE (OUTPATIENT)
Dept: GASTROENTEROLOGY | Facility: CLINIC | Age: 31
End: 2021-03-11

## 2021-03-11 ENCOUNTER — OFFICE VISIT (OUTPATIENT)
Dept: ORTHOPEDICS | Facility: CLINIC | Age: 31
End: 2021-03-11
Payer: COMMERCIAL

## 2021-03-11 ENCOUNTER — HOSPITAL ENCOUNTER (OUTPATIENT)
Dept: RADIOLOGY | Facility: OTHER | Age: 31
Discharge: HOME | End: 2021-03-11
Attending: ORTHOPAEDIC SURGERY
Payer: COMMERCIAL

## 2021-03-11 VITALS
WEIGHT: 175.06 LBS | HEART RATE: 120 BPM | HEIGHT: 66 IN | DIASTOLIC BLOOD PRESSURE: 98 MMHG | SYSTOLIC BLOOD PRESSURE: 141 MMHG | BODY MASS INDEX: 28.14 KG/M2

## 2021-03-11 DIAGNOSIS — M25.531 RIGHT WRIST PAIN: ICD-10-CM

## 2021-03-11 DIAGNOSIS — S62.021A DISPLACED FRACTURE OF MIDDLE THIRD OF NAVICULAR (SCAPHOID) BONE OF RIGHT WRIST, INITIAL ENCOUNTER FOR CLOSED FRACTURE: ICD-10-CM

## 2021-03-11 DIAGNOSIS — S62.021A DISPLACED FRACTURE OF MIDDLE THIRD OF NAVICULAR (SCAPHOID) BONE OF RIGHT WRIST, INITIAL ENCOUNTER FOR CLOSED FRACTURE: Primary | ICD-10-CM
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
## 2021-03-11 DIAGNOSIS — R93.89 ABNORMAL X-RAY: ICD-10-CM

## 2021-03-11 DIAGNOSIS — R19.7 DIARRHEA, UNSPECIFIED TYPE: Primary | ICD-10-CM

## 2021-03-11 PROCEDURE — 1126F PR PAIN SEVERITY QUANTIFIED, NO PAIN PRESENT: ICD-10-PCS | Mod: S$GLB,,, | Performed by: ORTHOPAEDIC SURGERY

## 2021-03-11 PROCEDURE — 1126F AMNT PAIN NOTED NONE PRSNT: CPT | Mod: S$GLB,,, | Performed by: ORTHOPAEDIC SURGERY

## 2021-03-11 PROCEDURE — 99204 PR OFFICE/OUTPT VISIT, NEW, LEVL IV, 45-59 MIN: ICD-10-PCS | Mod: S$GLB,,, | Performed by: ORTHOPAEDIC SURGERY

## 2021-03-11 PROCEDURE — 73200 CT UPPER EXTREMITY W/O DYE: CPT | Mod: 26,RT,, | Performed by: RADIOLOGY

## 2021-03-11 PROCEDURE — 99999 PR PBB SHADOW E&M-EST. PATIENT-LVL III: CPT | Mod: PBBFAC,,, | Performed by: ORTHOPAEDIC SURGERY

## 2021-03-11 PROCEDURE — 73200 CT WRIST WITHOUT CONTRAST RIGHT: ICD-10-PCS | Mod: 26,RT,, | Performed by: RADIOLOGY

## 2021-03-11 PROCEDURE — 3008F BODY MASS INDEX DOCD: CPT | Mod: CPTII,S$GLB,, | Performed by: ORTHOPAEDIC SURGERY

## 2021-03-11 PROCEDURE — 3008F PR BODY MASS INDEX (BMI) DOCUMENTED: ICD-10-PCS | Mod: CPTII,S$GLB,, | Performed by: ORTHOPAEDIC SURGERY

## 2021-03-11 PROCEDURE — 99999 PR PBB SHADOW E&M-EST. PATIENT-LVL III: ICD-10-PCS | Mod: PBBFAC,,, | Performed by: ORTHOPAEDIC SURGERY

## 2021-03-11 PROCEDURE — 73200 CT UPPER EXTREMITY W/O DYE: CPT | Mod: TC,RT

## 2021-03-11 PROCEDURE — 99204 OFFICE O/P NEW MOD 45 MIN: CPT | Mod: S$GLB,,, | Performed by: ORTHOPAEDIC SURGERY

## 2021-03-12 ENCOUNTER — TELEPHONE (OUTPATIENT)
Dept: ORTHOPEDICS | Facility: CLINIC | Age: 31
End: 2021-03-12

## 2021-03-14 DIAGNOSIS — K92.1 BLOOD IN STOOL: Primary | ICD-10-CM

## 2021-03-14 DIAGNOSIS — E55.9 VITAMIN D INSUFFICIENCY: ICD-10-CM

## 2021-03-14 RX ORDER — ERGOCALCIFEROL 1.25 MG/1
50000 CAPSULE ORAL
Qty: 12 CAPSULE | Refills: 0 | Status: SHIPPED | OUTPATIENT
Start: 2021-03-14 | End: 2021-05-31

## 2021-03-14 RX ORDER — ACETAMINOPHEN 500 MG
1 TABLET ORAL DAILY
Qty: 90 CAPSULE | Refills: 3 | Status: SHIPPED | OUTPATIENT
Start: 2021-03-14 | End: 2022-03-14

## 2021-03-15 ENCOUNTER — PATIENT MESSAGE (OUTPATIENT)
Dept: OPTOMETRY | Facility: CLINIC | Age: 31
End: 2021-03-15

## 2021-03-16 ENCOUNTER — TELEPHONE (OUTPATIENT)
Dept: GASTROENTEROLOGY | Facility: CLINIC | Age: 31
End: 2021-03-16

## 2021-03-16 ENCOUNTER — PATIENT MESSAGE (OUTPATIENT)
Dept: ENDOSCOPY | Facility: HOSPITAL | Age: 31
End: 2021-03-16

## 2021-03-16 ENCOUNTER — OFFICE VISIT (OUTPATIENT)
Dept: ORTHOPEDICS | Facility: CLINIC | Age: 31
End: 2021-03-16
Payer: COMMERCIAL

## 2021-03-16 VITALS
WEIGHT: 175.06 LBS | DIASTOLIC BLOOD PRESSURE: 77 MMHG | BODY MASS INDEX: 28.14 KG/M2 | OXYGEN SATURATION: 99 % | HEIGHT: 66 IN | HEART RATE: 112 BPM | SYSTOLIC BLOOD PRESSURE: 129 MMHG

## 2021-03-16 DIAGNOSIS — Z01.818 PRE-OP TESTING: ICD-10-CM

## 2021-03-16 DIAGNOSIS — S62.021A DISPLACED FRACTURE OF MIDDLE THIRD OF NAVICULAR (SCAPHOID) BONE OF RIGHT WRIST, INITIAL ENCOUNTER FOR CLOSED FRACTURE: Primary | ICD-10-CM

## 2021-03-16 PROCEDURE — 99214 PR OFFICE/OUTPT VISIT, EST, LEVL IV, 30-39 MIN: ICD-10-PCS | Mod: S$GLB,,, | Performed by: ORTHOPAEDIC SURGERY

## 2021-03-16 PROCEDURE — 3008F BODY MASS INDEX DOCD: CPT | Mod: CPTII,S$GLB,, | Performed by: ORTHOPAEDIC SURGERY

## 2021-03-16 PROCEDURE — 99999 PR PBB SHADOW E&M-EST. PATIENT-LVL III: CPT | Mod: PBBFAC,,, | Performed by: ORTHOPAEDIC SURGERY

## 2021-03-16 PROCEDURE — 3008F PR BODY MASS INDEX (BMI) DOCUMENTED: ICD-10-PCS | Mod: CPTII,S$GLB,, | Performed by: ORTHOPAEDIC SURGERY

## 2021-03-16 PROCEDURE — 99999 PR PBB SHADOW E&M-EST. PATIENT-LVL III: ICD-10-PCS | Mod: PBBFAC,,, | Performed by: ORTHOPAEDIC SURGERY

## 2021-03-16 PROCEDURE — 1125F PR PAIN SEVERITY QUANTIFIED, PAIN PRESENT: ICD-10-PCS | Mod: S$GLB,,, | Performed by: ORTHOPAEDIC SURGERY

## 2021-03-16 PROCEDURE — 1125F AMNT PAIN NOTED PAIN PRSNT: CPT | Mod: S$GLB,,, | Performed by: ORTHOPAEDIC SURGERY

## 2021-03-16 PROCEDURE — 99214 OFFICE O/P EST MOD 30 MIN: CPT | Mod: S$GLB,,, | Performed by: ORTHOPAEDIC SURGERY

## 2021-03-19 ENCOUNTER — HOSPITAL ENCOUNTER (OUTPATIENT)
Dept: PREADMISSION TESTING | Facility: OTHER | Age: 31
Discharge: HOME | End: 2021-03-19
Attending: ORTHOPAEDIC SURGERY
Payer: COMMERCIAL

## 2021-03-19 ENCOUNTER — PATIENT MESSAGE (OUTPATIENT)
Dept: SURGERY | Facility: HOSPITAL | Age: 31
End: 2021-03-19

## 2021-03-19 ENCOUNTER — TELEPHONE (OUTPATIENT)
Dept: ORTHOPEDICS | Facility: CLINIC | Age: 31
End: 2021-03-19

## 2021-03-19 ENCOUNTER — ANESTHESIA EVENT (OUTPATIENT)
Dept: SURGERY | Facility: HOSPITAL | Age: 31
End: 2021-03-19
Payer: COMMERCIAL

## 2021-03-19 DIAGNOSIS — Z98.890 POST-OPERATIVE STATE: Primary | ICD-10-CM

## 2021-03-19 DIAGNOSIS — Z01.818 PRE-OP TESTING: ICD-10-CM

## 2021-03-19 PROCEDURE — U0005 INFEC AGEN DETEC AMPLI PROBE: HCPCS | Performed by: ORTHOPAEDIC SURGERY

## 2021-03-19 PROCEDURE — U0003 INFECTIOUS AGENT DETECTION BY NUCLEIC ACID (DNA OR RNA); SEVERE ACUTE RESPIRATORY SYNDROME CORONAVIRUS 2 (SARS-COV-2) (CORONAVIRUS DISEASE [COVID-19]), AMPLIFIED PROBE TECHNIQUE, MAKING USE OF HIGH THROUGHPUT TECHNOLOGIES AS DESCRIBED BY CMS-2020-01-R: HCPCS | Performed by: ORTHOPAEDIC SURGERY

## 2021-03-19 RX ORDER — OXYCODONE AND ACETAMINOPHEN 5; 325 MG/1; MG/1
1 TABLET ORAL
Qty: 30 TABLET | Refills: 0 | Status: SHIPPED | OUTPATIENT
Start: 2021-03-19 | End: 2021-05-04

## 2021-03-21 LAB — SARS-COV-2 RNA RESP QL NAA+PROBE: NOT DETECTED

## 2021-03-22 ENCOUNTER — ANESTHESIA (OUTPATIENT)
Dept: SURGERY | Facility: HOSPITAL | Age: 31
End: 2021-03-22
Payer: COMMERCIAL

## 2021-03-22 ENCOUNTER — HOSPITAL ENCOUNTER (OUTPATIENT)
Facility: HOSPITAL | Age: 31
Discharge: HOME | End: 2021-03-22
Attending: ORTHOPAEDIC SURGERY | Admitting: ORTHOPAEDIC SURGERY
Payer: COMMERCIAL

## 2021-03-22 VITALS
TEMPERATURE: 97 F | BODY MASS INDEX: 27.32 KG/M2 | WEIGHT: 170 LBS | HEIGHT: 66 IN | RESPIRATION RATE: 18 BRPM | OXYGEN SATURATION: 99 % | HEART RATE: 95 BPM | SYSTOLIC BLOOD PRESSURE: 111 MMHG | DIASTOLIC BLOOD PRESSURE: 64 MMHG

## 2021-03-22 DIAGNOSIS — S62.021A DISPLACED FRACTURE OF MIDDLE THIRD OF NAVICULAR (SCAPHOID) BONE OF RIGHT WRIST, INITIAL ENCOUNTER FOR CLOSED FRACTURE: Primary | ICD-10-CM

## 2021-03-22 LAB
B-HCG UR QL: NEGATIVE
CTP QC/QA: YES

## 2021-03-22 PROCEDURE — 63600175 PHARM REV CODE 636 W HCPCS: Performed by: NURSE ANESTHETIST, CERTIFIED REGISTERED

## 2021-03-22 PROCEDURE — 25000003 PHARM REV CODE 250: Performed by: NURSE ANESTHETIST, CERTIFIED REGISTERED

## 2021-03-22 PROCEDURE — 63600175 PHARM REV CODE 636 W HCPCS: Performed by: STUDENT IN AN ORGANIZED HEALTH CARE EDUCATION/TRAINING PROGRAM

## 2021-03-22 PROCEDURE — 36000709 HC OR TIME LEV III EA ADD 15 MIN: Performed by: ORTHOPAEDIC SURGERY

## 2021-03-22 PROCEDURE — 25000003 PHARM REV CODE 250: Performed by: STUDENT IN AN ORGANIZED HEALTH CARE EDUCATION/TRAINING PROGRAM

## 2021-03-22 PROCEDURE — D9220A PRA ANESTHESIA: ICD-10-PCS | Mod: ANES,,, | Performed by: ANESTHESIOLOGY

## 2021-03-22 PROCEDURE — D9220A PRA ANESTHESIA: Mod: ANES,,, | Performed by: ANESTHESIOLOGY

## 2021-03-22 PROCEDURE — 71000015 HC POSTOP RECOV 1ST HR: Performed by: ORTHOPAEDIC SURGERY

## 2021-03-22 PROCEDURE — 37000008 HC ANESTHESIA 1ST 15 MINUTES: Performed by: ORTHOPAEDIC SURGERY

## 2021-03-22 PROCEDURE — C1769 GUIDE WIRE: HCPCS | Performed by: ORTHOPAEDIC SURGERY

## 2021-03-22 PROCEDURE — 27201423 OPTIME MED/SURG SUP & DEVICES STERILE SUPPLY: Performed by: ORTHOPAEDIC SURGERY

## 2021-03-22 PROCEDURE — 25628 PR OPEN TX CARPAL SCAPHOID NAVICULAR FRACTURE: ICD-10-PCS | Mod: RT,,, | Performed by: ORTHOPAEDIC SURGERY

## 2021-03-22 PROCEDURE — 25628 OPTX CARPL SCPHD FX INT FIXJ: CPT | Mod: RT,,, | Performed by: ORTHOPAEDIC SURGERY

## 2021-03-22 PROCEDURE — 36000708 HC OR TIME LEV III 1ST 15 MIN: Performed by: ORTHOPAEDIC SURGERY

## 2021-03-22 PROCEDURE — C1713 ANCHOR/SCREW BN/BN,TIS/BN: HCPCS | Performed by: ORTHOPAEDIC SURGERY

## 2021-03-22 PROCEDURE — D9220A PRA ANESTHESIA: Mod: CRNA,,, | Performed by: NURSE ANESTHETIST, CERTIFIED REGISTERED

## 2021-03-22 PROCEDURE — 37000009 HC ANESTHESIA EA ADD 15 MINS: Performed by: ORTHOPAEDIC SURGERY

## 2021-03-22 PROCEDURE — 99900035 HC TECH TIME PER 15 MIN (STAT)

## 2021-03-22 PROCEDURE — 71000033 HC RECOVERY, INTIAL HOUR: Performed by: ORTHOPAEDIC SURGERY

## 2021-03-22 PROCEDURE — 94761 N-INVAS EAR/PLS OXIMETRY MLT: CPT

## 2021-03-22 PROCEDURE — 25000003 PHARM REV CODE 250: Performed by: ORTHOPAEDIC SURGERY

## 2021-03-22 PROCEDURE — D9220A PRA ANESTHESIA: ICD-10-PCS | Mod: CRNA,,, | Performed by: NURSE ANESTHETIST, CERTIFIED REGISTERED

## 2021-03-22 DEVICE — SCREW BONE 18MM ACUTRAK II: Type: IMPLANTABLE DEVICE | Site: HAND | Status: FUNCTIONAL

## 2021-03-22 RX ORDER — CEFAZOLIN SODIUM 1 G/3ML
2 INJECTION, POWDER, FOR SOLUTION INTRAMUSCULAR; INTRAVENOUS
Status: COMPLETED | OUTPATIENT
Start: 2021-03-22 | End: 2021-03-22

## 2021-03-22 RX ORDER — FAMOTIDINE 10 MG/ML
INJECTION INTRAVENOUS
Status: DISCONTINUED | OUTPATIENT
Start: 2021-03-22 | End: 2021-03-22

## 2021-03-22 RX ORDER — ONDANSETRON 2 MG/ML
INJECTION INTRAMUSCULAR; INTRAVENOUS
Status: DISCONTINUED | OUTPATIENT
Start: 2021-03-22 | End: 2021-03-22

## 2021-03-22 RX ORDER — BACITRACIN ZINC 500 UNIT/G
OINTMENT (GRAM) TOPICAL
Status: DISCONTINUED | OUTPATIENT
Start: 2021-03-22 | End: 2021-03-22 | Stop reason: HOSPADM

## 2021-03-22 RX ORDER — DEXMEDETOMIDINE HYDROCHLORIDE 100 UG/ML
INJECTION, SOLUTION INTRAVENOUS
Status: DISCONTINUED | OUTPATIENT
Start: 2021-03-22 | End: 2021-03-22

## 2021-03-22 RX ORDER — HYDROCODONE BITARTRATE AND ACETAMINOPHEN 5; 325 MG/1; MG/1
1 TABLET ORAL EVERY 4 HOURS PRN
Status: DISCONTINUED | OUTPATIENT
Start: 2021-03-22 | End: 2021-03-22 | Stop reason: HOSPADM

## 2021-03-22 RX ORDER — ONDANSETRON 4 MG/1
8 TABLET, ORALLY DISINTEGRATING ORAL EVERY 8 HOURS PRN
Status: DISCONTINUED | OUTPATIENT
Start: 2021-03-22 | End: 2021-03-22 | Stop reason: HOSPADM

## 2021-03-22 RX ORDER — HYDROCODONE BITARTRATE AND ACETAMINOPHEN 10; 325 MG/1; MG/1
1 TABLET ORAL EVERY 4 HOURS PRN
Status: DISCONTINUED | OUTPATIENT
Start: 2021-03-22 | End: 2021-03-22 | Stop reason: HOSPADM

## 2021-03-22 RX ORDER — SODIUM CHLORIDE 0.9 % (FLUSH) 0.9 %
10 SYRINGE (ML) INJECTION
Status: DISCONTINUED | OUTPATIENT
Start: 2021-03-22 | End: 2021-03-22 | Stop reason: HOSPADM

## 2021-03-22 RX ORDER — PROPOFOL 10 MG/ML
VIAL (ML) INTRAVENOUS CONTINUOUS PRN
Status: DISCONTINUED | OUTPATIENT
Start: 2021-03-22 | End: 2021-03-22

## 2021-03-22 RX ORDER — FENTANYL CITRATE 50 UG/ML
25 INJECTION, SOLUTION INTRAMUSCULAR; INTRAVENOUS EVERY 5 MIN PRN
Status: DISCONTINUED | OUTPATIENT
Start: 2021-03-22 | End: 2021-03-22 | Stop reason: HOSPADM

## 2021-03-22 RX ORDER — MIDAZOLAM HYDROCHLORIDE 1 MG/ML
0.5 INJECTION INTRAMUSCULAR; INTRAVENOUS
Status: DISCONTINUED | OUTPATIENT
Start: 2021-03-22 | End: 2021-03-22 | Stop reason: HOSPADM

## 2021-03-22 RX ORDER — CELECOXIB 200 MG/1
400 CAPSULE ORAL ONCE
Status: COMPLETED | OUTPATIENT
Start: 2021-03-22 | End: 2021-03-22

## 2021-03-22 RX ORDER — ACETAMINOPHEN 325 MG/1
650 TABLET ORAL EVERY 4 HOURS PRN
Status: DISCONTINUED | OUTPATIENT
Start: 2021-03-22 | End: 2021-03-22 | Stop reason: HOSPADM

## 2021-03-22 RX ORDER — FENTANYL CITRATE 50 UG/ML
INJECTION, SOLUTION INTRAMUSCULAR; INTRAVENOUS
Status: DISCONTINUED | OUTPATIENT
Start: 2021-03-22 | End: 2021-03-22

## 2021-03-22 RX ORDER — SODIUM CHLORIDE 9 MG/ML
INJECTION, SOLUTION INTRAVENOUS CONTINUOUS
Status: DISCONTINUED | OUTPATIENT
Start: 2021-03-22 | End: 2021-03-22 | Stop reason: HOSPADM

## 2021-03-22 RX ORDER — CARBOXYMETHYLCELLULOSE SODIUM 10 MG/ML
GEL OPHTHALMIC
Status: DISCONTINUED | OUTPATIENT
Start: 2021-03-22 | End: 2021-03-22

## 2021-03-22 RX ORDER — ACETAMINOPHEN 500 MG
1000 TABLET ORAL
Status: COMPLETED | OUTPATIENT
Start: 2021-03-22 | End: 2021-03-22

## 2021-03-22 RX ORDER — PROPOFOL 10 MG/ML
VIAL (ML) INTRAVENOUS
Status: DISCONTINUED | OUTPATIENT
Start: 2021-03-22 | End: 2021-03-22

## 2021-03-22 RX ORDER — MIDAZOLAM HYDROCHLORIDE 1 MG/ML
INJECTION, SOLUTION INTRAMUSCULAR; INTRAVENOUS
Status: DISCONTINUED | OUTPATIENT
Start: 2021-03-22 | End: 2021-03-22

## 2021-03-22 RX ORDER — LIDOCAINE HYDROCHLORIDE 20 MG/ML
INJECTION INTRAVENOUS
Status: DISCONTINUED | OUTPATIENT
Start: 2021-03-22 | End: 2021-03-22

## 2021-03-22 RX ORDER — DEXAMETHASONE SODIUM PHOSPHATE 4 MG/ML
INJECTION, SOLUTION INTRA-ARTICULAR; INTRALESIONAL; INTRAMUSCULAR; INTRAVENOUS; SOFT TISSUE
Status: DISCONTINUED | OUTPATIENT
Start: 2021-03-22 | End: 2021-03-22

## 2021-03-22 RX ORDER — MUPIROCIN 20 MG/G
OINTMENT TOPICAL 2 TIMES DAILY
Status: DISCONTINUED | OUTPATIENT
Start: 2021-03-22 | End: 2021-03-22 | Stop reason: HOSPADM

## 2021-03-22 RX ORDER — MUPIROCIN 20 MG/G
OINTMENT TOPICAL
Status: DISCONTINUED | OUTPATIENT
Start: 2021-03-22 | End: 2021-03-22 | Stop reason: HOSPADM

## 2021-03-22 RX ORDER — KETAMINE HCL IN 0.9 % NACL 50 MG/5 ML
SYRINGE (ML) INTRAVENOUS
Status: DISCONTINUED | OUTPATIENT
Start: 2021-03-22 | End: 2021-03-22

## 2021-03-22 RX ADMIN — ONDANSETRON 4 MG: 2 INJECTION, SOLUTION INTRAMUSCULAR; INTRAVENOUS at 08:03

## 2021-03-22 RX ADMIN — DEXMEDETOMIDINE HYDROCHLORIDE 8 MCG: 100 INJECTION, SOLUTION, CONCENTRATE INTRAVENOUS at 07:03

## 2021-03-22 RX ADMIN — SODIUM CHLORIDE: 0.9 INJECTION, SOLUTION INTRAVENOUS at 07:03

## 2021-03-22 RX ADMIN — CEFAZOLIN 2 G: 330 INJECTION, POWDER, FOR SOLUTION INTRAMUSCULAR; INTRAVENOUS at 07:03

## 2021-03-22 RX ADMIN — PROPOFOL 20 MG: 10 INJECTION, EMULSION INTRAVENOUS at 08:03

## 2021-03-22 RX ADMIN — FENTANYL CITRATE 25 MCG: 50 INJECTION, SOLUTION INTRAMUSCULAR; INTRAVENOUS at 08:03

## 2021-03-22 RX ADMIN — MIDAZOLAM HYDROCHLORIDE 0.5 MG: 1 INJECTION, SOLUTION INTRAMUSCULAR; INTRAVENOUS at 08:03

## 2021-03-22 RX ADMIN — Medication 10 MG: at 08:03

## 2021-03-22 RX ADMIN — CELECOXIB 400 MG: 200 CAPSULE ORAL at 07:03

## 2021-03-22 RX ADMIN — MUPIROCIN: 20 OINTMENT TOPICAL at 07:03

## 2021-03-22 RX ADMIN — Medication 10 MG: at 07:03

## 2021-03-22 RX ADMIN — MIDAZOLAM HYDROCHLORIDE 1 MG: 1 INJECTION, SOLUTION INTRAMUSCULAR; INTRAVENOUS at 07:03

## 2021-03-22 RX ADMIN — PROPOFOL 100 MCG/KG/MIN: 10 INJECTION, EMULSION INTRAVENOUS at 07:03

## 2021-03-22 RX ADMIN — FENTANYL CITRATE 25 MCG: 50 INJECTION, SOLUTION INTRAMUSCULAR; INTRAVENOUS at 07:03

## 2021-03-22 RX ADMIN — FAMOTIDINE 20 MG: 10 INJECTION, SOLUTION INTRAVENOUS at 08:03

## 2021-03-22 RX ADMIN — DEXMEDETOMIDINE HYDROCHLORIDE 4 MCG: 100 INJECTION, SOLUTION, CONCENTRATE INTRAVENOUS at 08:03

## 2021-03-22 RX ADMIN — MIDAZOLAM HYDROCHLORIDE 2 MG: 1 INJECTION, SOLUTION INTRAMUSCULAR; INTRAVENOUS at 07:03

## 2021-03-22 RX ADMIN — ACETAMINOPHEN 1000 MG: 500 TABLET ORAL at 07:03

## 2021-03-22 RX ADMIN — FENTANYL CITRATE 50 MCG: 50 INJECTION INTRAMUSCULAR; INTRAVENOUS at 07:03

## 2021-03-22 RX ADMIN — DEXAMETHASONE SODIUM PHOSPHATE 8 MG: 4 INJECTION, SOLUTION INTRAMUSCULAR; INTRAVENOUS at 08:03

## 2021-03-22 RX ADMIN — PROPOFOL 20 MG: 10 INJECTION, EMULSION INTRAVENOUS at 07:03

## 2021-03-22 RX ADMIN — LIDOCAINE HYDROCHLORIDE 60 MG: 20 INJECTION, SOLUTION INTRAVENOUS at 07:03

## 2021-03-22 RX ADMIN — CARBOXYMETHYLCELLULOSE SODIUM 4 DROP: 10 GEL OPHTHALMIC at 08:03

## 2021-03-24 ENCOUNTER — PATIENT MESSAGE (OUTPATIENT)
Dept: ORTHOPEDICS | Facility: CLINIC | Age: 31
End: 2021-03-24

## 2021-03-30 ENCOUNTER — DOCUMENTATION ONLY (OUTPATIENT)
Dept: ORTHOPEDICS | Facility: CLINIC | Age: 31
End: 2021-03-30

## 2021-03-30 ENCOUNTER — OFFICE VISIT (OUTPATIENT)
Dept: ORTHOPEDICS | Facility: CLINIC | Age: 31
End: 2021-03-30
Payer: COMMERCIAL

## 2021-03-30 ENCOUNTER — PATIENT MESSAGE (OUTPATIENT)
Dept: ORTHOPEDICS | Facility: CLINIC | Age: 31
End: 2021-03-30

## 2021-03-30 DIAGNOSIS — S62.021A DISPLACED FRACTURE OF MIDDLE THIRD OF NAVICULAR (SCAPHOID) BONE OF RIGHT WRIST, INITIAL ENCOUNTER FOR CLOSED FRACTURE: Primary | ICD-10-CM

## 2021-03-30 PROCEDURE — 29125 PR APPLY FOREARM SPLINT,STATIC: ICD-10-PCS | Mod: 58,RT,S$GLB, | Performed by: PHYSICIAN ASSISTANT

## 2021-03-30 PROCEDURE — 99999 PR PBB SHADOW E&M-EST. PATIENT-LVL II: ICD-10-PCS | Mod: PBBFAC,,, | Performed by: PHYSICIAN ASSISTANT

## 2021-03-30 PROCEDURE — 99024 PR POST-OP FOLLOW-UP VISIT: ICD-10-PCS | Mod: S$GLB,,, | Performed by: PHYSICIAN ASSISTANT

## 2021-03-30 PROCEDURE — 29125 APPL SHORT ARM SPLINT STATIC: CPT | Mod: 58,RT,S$GLB, | Performed by: PHYSICIAN ASSISTANT

## 2021-03-30 PROCEDURE — 99999 PR PBB SHADOW E&M-EST. PATIENT-LVL II: CPT | Mod: PBBFAC,,, | Performed by: PHYSICIAN ASSISTANT

## 2021-03-30 PROCEDURE — 99024 POSTOP FOLLOW-UP VISIT: CPT | Mod: S$GLB,,, | Performed by: PHYSICIAN ASSISTANT

## 2021-03-30 RX ORDER — DOXYCYCLINE HYCLATE 100 MG
100 TABLET ORAL EVERY 12 HOURS
Qty: 20 TABLET | Refills: 0 | Status: SHIPPED | OUTPATIENT
Start: 2021-03-30 | End: 2021-04-09

## 2021-03-31 ENCOUNTER — PATIENT MESSAGE (OUTPATIENT)
Dept: ORTHOPEDICS | Facility: CLINIC | Age: 31
End: 2021-03-31

## 2021-04-01 ENCOUNTER — TELEPHONE (OUTPATIENT)
Dept: ORTHOPEDICS | Facility: CLINIC | Age: 31
End: 2021-04-01

## 2021-04-05 ENCOUNTER — DOCUMENTATION ONLY (OUTPATIENT)
Dept: ORTHOPEDICS | Facility: CLINIC | Age: 31
End: 2021-04-05

## 2021-04-05 ENCOUNTER — PATIENT MESSAGE (OUTPATIENT)
Dept: ORTHOPEDICS | Facility: CLINIC | Age: 31
End: 2021-04-05

## 2021-04-05 ENCOUNTER — OFFICE VISIT (OUTPATIENT)
Dept: ORTHOPEDICS | Facility: CLINIC | Age: 31
End: 2021-04-05
Payer: COMMERCIAL

## 2021-04-05 VITALS
HEIGHT: 66 IN | HEART RATE: 91 BPM | BODY MASS INDEX: 27.32 KG/M2 | WEIGHT: 170 LBS | DIASTOLIC BLOOD PRESSURE: 82 MMHG | SYSTOLIC BLOOD PRESSURE: 114 MMHG

## 2021-04-05 DIAGNOSIS — Z47.89 ORTHOPEDIC AFTERCARE: ICD-10-CM

## 2021-04-05 DIAGNOSIS — S62.021A DISPLACED FRACTURE OF MIDDLE THIRD OF NAVICULAR (SCAPHOID) BONE OF RIGHT WRIST, INITIAL ENCOUNTER FOR CLOSED FRACTURE: Primary | ICD-10-CM

## 2021-04-05 PROCEDURE — 29125 PR APPLY FOREARM SPLINT,STATIC: ICD-10-PCS | Mod: 58,RT,S$GLB, | Performed by: PHYSICIAN ASSISTANT

## 2021-04-05 PROCEDURE — 29125 APPL SHORT ARM SPLINT STATIC: CPT | Mod: 58,RT,S$GLB, | Performed by: PHYSICIAN ASSISTANT

## 2021-04-05 PROCEDURE — 3008F PR BODY MASS INDEX (BMI) DOCUMENTED: ICD-10-PCS | Mod: CPTII,S$GLB,, | Performed by: PHYSICIAN ASSISTANT

## 2021-04-05 PROCEDURE — 1126F AMNT PAIN NOTED NONE PRSNT: CPT | Mod: S$GLB,,, | Performed by: PHYSICIAN ASSISTANT

## 2021-04-05 PROCEDURE — 99024 PR POST-OP FOLLOW-UP VISIT: ICD-10-PCS | Mod: S$GLB,,, | Performed by: PHYSICIAN ASSISTANT

## 2021-04-05 PROCEDURE — 99024 POSTOP FOLLOW-UP VISIT: CPT | Mod: S$GLB,,, | Performed by: PHYSICIAN ASSISTANT

## 2021-04-05 PROCEDURE — 3008F BODY MASS INDEX DOCD: CPT | Mod: CPTII,S$GLB,, | Performed by: PHYSICIAN ASSISTANT

## 2021-04-05 PROCEDURE — 99999 PR PBB SHADOW E&M-EST. PATIENT-LVL III: ICD-10-PCS | Mod: PBBFAC,,, | Performed by: PHYSICIAN ASSISTANT

## 2021-04-05 PROCEDURE — 1126F PR PAIN SEVERITY QUANTIFIED, NO PAIN PRESENT: ICD-10-PCS | Mod: S$GLB,,, | Performed by: PHYSICIAN ASSISTANT

## 2021-04-05 PROCEDURE — 99999 PR PBB SHADOW E&M-EST. PATIENT-LVL III: CPT | Mod: PBBFAC,,, | Performed by: PHYSICIAN ASSISTANT

## 2021-04-07 ENCOUNTER — OFFICE VISIT (OUTPATIENT)
Dept: ORTHOPEDICS | Facility: CLINIC | Age: 31
End: 2021-04-07
Payer: COMMERCIAL

## 2021-04-07 ENCOUNTER — TELEPHONE (OUTPATIENT)
Dept: ORTHOPEDICS | Facility: CLINIC | Age: 31
End: 2021-04-07

## 2021-04-07 ENCOUNTER — PATIENT MESSAGE (OUTPATIENT)
Dept: ORTHOPEDICS | Facility: CLINIC | Age: 31
End: 2021-04-07

## 2021-04-07 ENCOUNTER — DOCUMENTATION ONLY (OUTPATIENT)
Dept: ORTHOPEDICS | Facility: CLINIC | Age: 31
End: 2021-04-07

## 2021-04-07 DIAGNOSIS — T49.95XA ADVERSE EFFECT OF DRUG THAT ACTS PRIMARILY ON SKIN, INITIAL ENCOUNTER: ICD-10-CM

## 2021-04-07 DIAGNOSIS — S62.021A DISPLACED FRACTURE OF MIDDLE THIRD OF NAVICULAR (SCAPHOID) BONE OF RIGHT WRIST, INITIAL ENCOUNTER FOR CLOSED FRACTURE: Primary | ICD-10-CM

## 2021-04-07 PROCEDURE — 29125 PR APPLY FOREARM SPLINT,STATIC: ICD-10-PCS | Mod: 58,RT,S$GLB, | Performed by: PHYSICIAN ASSISTANT

## 2021-04-07 PROCEDURE — 99024 PR POST-OP FOLLOW-UP VISIT: ICD-10-PCS | Mod: S$GLB,,, | Performed by: PHYSICIAN ASSISTANT

## 2021-04-07 PROCEDURE — 99024 POSTOP FOLLOW-UP VISIT: CPT | Mod: S$GLB,,, | Performed by: PHYSICIAN ASSISTANT

## 2021-04-07 PROCEDURE — 29125 APPL SHORT ARM SPLINT STATIC: CPT | Mod: 58,RT,S$GLB, | Performed by: PHYSICIAN ASSISTANT

## 2021-04-12 ENCOUNTER — PATIENT MESSAGE (OUTPATIENT)
Dept: ORTHOPEDICS | Facility: CLINIC | Age: 31
End: 2021-04-12

## 2021-04-13 ENCOUNTER — DOCUMENTATION ONLY (OUTPATIENT)
Dept: ORTHOPEDICS | Facility: CLINIC | Age: 31
End: 2021-04-13

## 2021-04-13 ENCOUNTER — OFFICE VISIT (OUTPATIENT)
Dept: ORTHOPEDICS | Facility: CLINIC | Age: 31
End: 2021-04-13
Payer: COMMERCIAL

## 2021-04-13 DIAGNOSIS — T49.95XA ADVERSE EFFECT OF DRUG THAT ACTS PRIMARILY ON SKIN, INITIAL ENCOUNTER: ICD-10-CM

## 2021-04-13 DIAGNOSIS — S62.021A DISPLACED FRACTURE OF MIDDLE THIRD OF NAVICULAR (SCAPHOID) BONE OF RIGHT WRIST, INITIAL ENCOUNTER FOR CLOSED FRACTURE: Primary | ICD-10-CM

## 2021-04-13 DIAGNOSIS — Z47.89 ORTHOPEDIC AFTERCARE: ICD-10-CM

## 2021-04-13 PROCEDURE — 99024 POSTOP FOLLOW-UP VISIT: CPT | Mod: S$GLB,,, | Performed by: PHYSICIAN ASSISTANT

## 2021-04-13 PROCEDURE — 29075 PR APPLY FOREARM CAST: ICD-10-PCS | Mod: 58,RT,S$GLB, | Performed by: PHYSICIAN ASSISTANT

## 2021-04-13 PROCEDURE — 29075 APPL CST ELBW FNGR SHORT ARM: CPT | Mod: 58,RT,S$GLB, | Performed by: PHYSICIAN ASSISTANT

## 2021-04-13 PROCEDURE — 99999 PR PBB SHADOW E&M-EST. PATIENT-LVL II: ICD-10-PCS | Mod: PBBFAC,,, | Performed by: PHYSICIAN ASSISTANT

## 2021-04-13 PROCEDURE — 99999 PR PBB SHADOW E&M-EST. PATIENT-LVL II: CPT | Mod: PBBFAC,,, | Performed by: PHYSICIAN ASSISTANT

## 2021-04-13 PROCEDURE — 99024 PR POST-OP FOLLOW-UP VISIT: ICD-10-PCS | Mod: S$GLB,,, | Performed by: PHYSICIAN ASSISTANT

## 2021-04-16 ENCOUNTER — PATIENT MESSAGE (OUTPATIENT)
Dept: ORTHOPEDICS | Facility: CLINIC | Age: 31
End: 2021-04-16

## 2021-04-19 ENCOUNTER — OFFICE VISIT (OUTPATIENT)
Dept: ORTHOPEDICS | Facility: CLINIC | Age: 31
End: 2021-04-19
Payer: COMMERCIAL

## 2021-04-19 ENCOUNTER — HOSPITAL ENCOUNTER (OUTPATIENT)
Dept: RADIOLOGY | Facility: OTHER | Age: 31
Discharge: HOME | End: 2021-04-19
Attending: PHYSICIAN ASSISTANT
Payer: COMMERCIAL

## 2021-04-19 VITALS
BODY MASS INDEX: 27.16 KG/M2 | HEART RATE: 112 BPM | DIASTOLIC BLOOD PRESSURE: 83 MMHG | SYSTOLIC BLOOD PRESSURE: 115 MMHG | WEIGHT: 169 LBS | HEIGHT: 66 IN

## 2021-04-19 DIAGNOSIS — S62.021A DISPLACED FRACTURE OF MIDDLE THIRD OF NAVICULAR (SCAPHOID) BONE OF RIGHT WRIST, INITIAL ENCOUNTER FOR CLOSED FRACTURE: Primary | ICD-10-CM

## 2021-04-19 DIAGNOSIS — Z47.89 ORTHOPEDIC AFTERCARE: ICD-10-CM

## 2021-04-19 DIAGNOSIS — T49.95XA ADVERSE EFFECT OF DRUG THAT ACTS PRIMARILY ON SKIN, INITIAL ENCOUNTER: ICD-10-CM

## 2021-04-19 DIAGNOSIS — S62.021A DISPLACED FRACTURE OF MIDDLE THIRD OF NAVICULAR (SCAPHOID) BONE OF RIGHT WRIST, INITIAL ENCOUNTER FOR CLOSED FRACTURE: ICD-10-CM

## 2021-04-19 PROCEDURE — 1126F AMNT PAIN NOTED NONE PRSNT: CPT | Mod: S$GLB,,, | Performed by: PHYSICIAN ASSISTANT

## 2021-04-19 PROCEDURE — 99024 POSTOP FOLLOW-UP VISIT: CPT | Mod: S$GLB,,, | Performed by: PHYSICIAN ASSISTANT

## 2021-04-19 PROCEDURE — 3008F BODY MASS INDEX DOCD: CPT | Mod: CPTII,S$GLB,, | Performed by: PHYSICIAN ASSISTANT

## 2021-04-19 PROCEDURE — 73110 X-RAY EXAM OF WRIST: CPT | Mod: TC,FY,RT

## 2021-04-19 PROCEDURE — 3008F PR BODY MASS INDEX (BMI) DOCUMENTED: ICD-10-PCS | Mod: CPTII,S$GLB,, | Performed by: PHYSICIAN ASSISTANT

## 2021-04-19 PROCEDURE — 97760 ORTHOTIC MGMT&TRAING 1ST ENC: CPT | Mod: S$GLB,,, | Performed by: PHYSICIAN ASSISTANT

## 2021-04-19 PROCEDURE — 99999 PR PBB SHADOW E&M-EST. PATIENT-LVL III: ICD-10-PCS | Mod: PBBFAC,,, | Performed by: PHYSICIAN ASSISTANT

## 2021-04-19 PROCEDURE — 99999 PR PBB SHADOW E&M-EST. PATIENT-LVL III: CPT | Mod: PBBFAC,,, | Performed by: PHYSICIAN ASSISTANT

## 2021-04-19 PROCEDURE — 73110 XR WRIST COMPLETE 3 VIEWS RIGHT: ICD-10-PCS | Mod: 26,RT,, | Performed by: RADIOLOGY

## 2021-04-19 PROCEDURE — 1126F PR PAIN SEVERITY QUANTIFIED, NO PAIN PRESENT: ICD-10-PCS | Mod: S$GLB,,, | Performed by: PHYSICIAN ASSISTANT

## 2021-04-19 PROCEDURE — 73110 X-RAY EXAM OF WRIST: CPT | Mod: 26,RT,, | Performed by: RADIOLOGY

## 2021-04-19 PROCEDURE — 97760 PR ORTHOTIC MGMT&TRAINJ INITIAL ENC EA 15 MINS: ICD-10-PCS | Mod: S$GLB,,, | Performed by: PHYSICIAN ASSISTANT

## 2021-04-19 PROCEDURE — 99024 PR POST-OP FOLLOW-UP VISIT: ICD-10-PCS | Mod: S$GLB,,, | Performed by: PHYSICIAN ASSISTANT

## 2021-04-20 ENCOUNTER — PATIENT MESSAGE (OUTPATIENT)
Dept: ORTHOPEDICS | Facility: CLINIC | Age: 31
End: 2021-04-20

## 2021-05-04 ENCOUNTER — HOSPITAL ENCOUNTER (OUTPATIENT)
Dept: RADIOLOGY | Facility: OTHER | Age: 31
Discharge: HOME | End: 2021-05-04
Attending: PHYSICIAN ASSISTANT
Payer: COMMERCIAL

## 2021-05-04 ENCOUNTER — OFFICE VISIT (OUTPATIENT)
Dept: ORTHOPEDICS | Facility: CLINIC | Age: 31
End: 2021-05-04
Payer: COMMERCIAL

## 2021-05-04 ENCOUNTER — PATIENT MESSAGE (OUTPATIENT)
Dept: ORTHOPEDICS | Facility: CLINIC | Age: 31
End: 2021-05-04

## 2021-05-04 VITALS
SYSTOLIC BLOOD PRESSURE: 148 MMHG | HEART RATE: 106 BPM | WEIGHT: 169 LBS | BODY MASS INDEX: 27.16 KG/M2 | DIASTOLIC BLOOD PRESSURE: 82 MMHG | HEIGHT: 66 IN

## 2021-05-04 DIAGNOSIS — Z47.89 ORTHOPEDIC AFTERCARE: ICD-10-CM

## 2021-05-04 DIAGNOSIS — S62.021A DISPLACED FRACTURE OF MIDDLE THIRD OF NAVICULAR (SCAPHOID) BONE OF RIGHT WRIST, INITIAL ENCOUNTER FOR CLOSED FRACTURE: ICD-10-CM

## 2021-05-04 DIAGNOSIS — S62.021A DISPLACED FRACTURE OF MIDDLE THIRD OF NAVICULAR (SCAPHOID) BONE OF RIGHT WRIST, INITIAL ENCOUNTER FOR CLOSED FRACTURE: Primary | ICD-10-CM

## 2021-05-04 PROCEDURE — 3008F BODY MASS INDEX DOCD: CPT | Mod: CPTII,S$GLB,, | Performed by: PHYSICIAN ASSISTANT

## 2021-05-04 PROCEDURE — 73110 X-RAY EXAM OF WRIST: CPT | Mod: TC,FY,RT

## 2021-05-04 PROCEDURE — 73110 XR WRIST COMPLETE 3 VIEWS RIGHT: ICD-10-PCS | Mod: 26,RT,, | Performed by: RADIOLOGY

## 2021-05-04 PROCEDURE — 99999 PR PBB SHADOW E&M-EST. PATIENT-LVL IV: ICD-10-PCS | Mod: PBBFAC,,, | Performed by: PHYSICIAN ASSISTANT

## 2021-05-04 PROCEDURE — 1126F PR PAIN SEVERITY QUANTIFIED, NO PAIN PRESENT: ICD-10-PCS | Mod: S$GLB,,, | Performed by: PHYSICIAN ASSISTANT

## 2021-05-04 PROCEDURE — 1126F AMNT PAIN NOTED NONE PRSNT: CPT | Mod: S$GLB,,, | Performed by: PHYSICIAN ASSISTANT

## 2021-05-04 PROCEDURE — 99999 PR PBB SHADOW E&M-EST. PATIENT-LVL IV: CPT | Mod: PBBFAC,,, | Performed by: PHYSICIAN ASSISTANT

## 2021-05-04 PROCEDURE — 3008F PR BODY MASS INDEX (BMI) DOCUMENTED: ICD-10-PCS | Mod: CPTII,S$GLB,, | Performed by: PHYSICIAN ASSISTANT

## 2021-05-04 PROCEDURE — 73110 X-RAY EXAM OF WRIST: CPT | Mod: 26,RT,, | Performed by: RADIOLOGY

## 2021-05-04 PROCEDURE — 99024 PR POST-OP FOLLOW-UP VISIT: ICD-10-PCS | Mod: S$GLB,,, | Performed by: PHYSICIAN ASSISTANT

## 2021-05-04 PROCEDURE — 99024 POSTOP FOLLOW-UP VISIT: CPT | Mod: S$GLB,,, | Performed by: PHYSICIAN ASSISTANT

## 2021-05-10 ENCOUNTER — CLINICAL SUPPORT (OUTPATIENT)
Dept: REHABILITATION | Facility: HOSPITAL | Age: 31
End: 2021-05-10
Payer: COMMERCIAL

## 2021-05-10 DIAGNOSIS — M25.531 RIGHT WRIST PAIN: ICD-10-CM

## 2021-05-10 DIAGNOSIS — M25.631 DECREASED RANGE OF MOTION OF RIGHT WRIST: ICD-10-CM

## 2021-05-10 PROCEDURE — 97165 OT EVAL LOW COMPLEX 30 MIN: CPT

## 2021-05-11 PROBLEM — M25.631 DECREASED RANGE OF MOTION OF RIGHT WRIST: Status: ACTIVE | Noted: 2021-05-11

## 2021-05-11 PROBLEM — M25.531 RIGHT WRIST PAIN: Status: ACTIVE | Noted: 2021-05-11

## 2021-05-14 ENCOUNTER — CLINICAL SUPPORT (OUTPATIENT)
Dept: REHABILITATION | Facility: HOSPITAL | Age: 31
End: 2021-05-14
Payer: COMMERCIAL

## 2021-05-14 DIAGNOSIS — M25.631 DECREASED RANGE OF MOTION OF RIGHT WRIST: ICD-10-CM

## 2021-05-14 DIAGNOSIS — M25.531 RIGHT WRIST PAIN: ICD-10-CM

## 2021-05-14 PROCEDURE — 97018 PARAFFIN BATH THERAPY: CPT | Mod: 59

## 2021-05-14 PROCEDURE — 97110 THERAPEUTIC EXERCISES: CPT

## 2021-05-14 PROCEDURE — 97140 MANUAL THERAPY 1/> REGIONS: CPT

## 2021-05-17 ENCOUNTER — PATIENT MESSAGE (OUTPATIENT)
Dept: ORTHOPEDICS | Facility: CLINIC | Age: 31
End: 2021-05-17

## 2021-05-17 ENCOUNTER — TELEPHONE (OUTPATIENT)
Dept: ORTHOPEDICS | Facility: CLINIC | Age: 31
End: 2021-05-17

## 2021-05-17 ENCOUNTER — PATIENT MESSAGE (OUTPATIENT)
Dept: REHABILITATION | Facility: HOSPITAL | Age: 31
End: 2021-05-17

## 2021-05-17 DIAGNOSIS — R52 PAIN: Primary | ICD-10-CM

## 2021-05-18 ENCOUNTER — HOSPITAL ENCOUNTER (OUTPATIENT)
Dept: RADIOLOGY | Facility: OTHER | Age: 31
Discharge: HOME | End: 2021-05-18
Attending: PHYSICIAN ASSISTANT
Payer: COMMERCIAL

## 2021-05-18 ENCOUNTER — PATIENT MESSAGE (OUTPATIENT)
Dept: ORTHOPEDICS | Facility: CLINIC | Age: 31
End: 2021-05-18

## 2021-05-18 ENCOUNTER — CLINICAL SUPPORT (OUTPATIENT)
Dept: REHABILITATION | Facility: HOSPITAL | Age: 31
End: 2021-05-18
Payer: COMMERCIAL

## 2021-05-18 DIAGNOSIS — M25.631 DECREASED RANGE OF MOTION OF RIGHT WRIST: ICD-10-CM

## 2021-05-18 DIAGNOSIS — M25.531 RIGHT WRIST PAIN: ICD-10-CM

## 2021-05-18 DIAGNOSIS — R52 PAIN: ICD-10-CM

## 2021-05-18 PROCEDURE — 73110 XR WRIST COMPLETE 3 VIEWS RIGHT: ICD-10-PCS | Mod: 26,RT,, | Performed by: RADIOLOGY

## 2021-05-18 PROCEDURE — 97110 THERAPEUTIC EXERCISES: CPT

## 2021-05-18 PROCEDURE — 97140 MANUAL THERAPY 1/> REGIONS: CPT

## 2021-05-18 PROCEDURE — 97018 PARAFFIN BATH THERAPY: CPT | Mod: 59

## 2021-05-18 PROCEDURE — 73110 X-RAY EXAM OF WRIST: CPT | Mod: TC,FY,RT

## 2021-05-18 PROCEDURE — 73110 X-RAY EXAM OF WRIST: CPT | Mod: 26,RT,, | Performed by: RADIOLOGY

## 2021-05-27 ENCOUNTER — OFFICE VISIT (OUTPATIENT)
Dept: OBSTETRICS AND GYNECOLOGY | Facility: CLINIC | Age: 31
End: 2021-05-27
Payer: COMMERCIAL

## 2021-05-27 VITALS
SYSTOLIC BLOOD PRESSURE: 110 MMHG | HEIGHT: 66 IN | BODY MASS INDEX: 27.74 KG/M2 | WEIGHT: 172.63 LBS | DIASTOLIC BLOOD PRESSURE: 86 MMHG

## 2021-05-27 DIAGNOSIS — Z30.41 ENCOUNTER FOR SURVEILLANCE OF CONTRACEPTIVE PILLS: ICD-10-CM

## 2021-05-27 DIAGNOSIS — Z01.419 ENCOUNTER FOR GYNECOLOGICAL EXAMINATION: Primary | ICD-10-CM

## 2021-05-27 PROCEDURE — 99999 PR PBB SHADOW E&M-EST. PATIENT-LVL III: CPT | Mod: PBBFAC,,, | Performed by: OBSTETRICS & GYNECOLOGY

## 2021-05-27 PROCEDURE — 99999 PR PBB SHADOW E&M-EST. PATIENT-LVL III: ICD-10-PCS | Mod: PBBFAC,,, | Performed by: OBSTETRICS & GYNECOLOGY

## 2021-05-27 PROCEDURE — 99395 PREV VISIT EST AGE 18-39: CPT | Mod: S$GLB,,, | Performed by: OBSTETRICS & GYNECOLOGY

## 2021-05-27 PROCEDURE — 99395 PR PREVENTIVE VISIT,EST,18-39: ICD-10-PCS | Mod: S$GLB,,, | Performed by: OBSTETRICS & GYNECOLOGY

## 2021-05-27 PROCEDURE — 1126F AMNT PAIN NOTED NONE PRSNT: CPT | Mod: S$GLB,,, | Performed by: OBSTETRICS & GYNECOLOGY

## 2021-05-27 PROCEDURE — 1126F PR PAIN SEVERITY QUANTIFIED, NO PAIN PRESENT: ICD-10-PCS | Mod: S$GLB,,, | Performed by: OBSTETRICS & GYNECOLOGY

## 2021-05-27 PROCEDURE — 3008F BODY MASS INDEX DOCD: CPT | Mod: CPTII,S$GLB,, | Performed by: OBSTETRICS & GYNECOLOGY

## 2021-05-27 PROCEDURE — 3008F PR BODY MASS INDEX (BMI) DOCUMENTED: ICD-10-PCS | Mod: CPTII,S$GLB,, | Performed by: OBSTETRICS & GYNECOLOGY

## 2021-05-27 RX ORDER — NORETHINDRONE ACETATE AND ETHINYL ESTRADIOL .02; 1 MG/1; MG/1
1 TABLET ORAL DAILY
Qty: 84 TABLET | Refills: 3 | Status: SHIPPED | OUTPATIENT
Start: 2021-05-27 | End: 2022-08-02 | Stop reason: SDUPTHER

## 2021-05-31 ENCOUNTER — CLINICAL SUPPORT (OUTPATIENT)
Dept: REHABILITATION | Facility: HOSPITAL | Age: 31
End: 2021-05-31
Payer: COMMERCIAL

## 2021-05-31 DIAGNOSIS — M25.531 RIGHT WRIST PAIN: ICD-10-CM

## 2021-05-31 DIAGNOSIS — M25.631 DECREASED RANGE OF MOTION OF RIGHT WRIST: ICD-10-CM

## 2021-05-31 PROCEDURE — 97140 MANUAL THERAPY 1/> REGIONS: CPT

## 2021-05-31 PROCEDURE — 97110 THERAPEUTIC EXERCISES: CPT

## 2021-05-31 PROCEDURE — 97018 PARAFFIN BATH THERAPY: CPT

## 2021-06-07 ENCOUNTER — CLINICAL SUPPORT (OUTPATIENT)
Dept: REHABILITATION | Facility: HOSPITAL | Age: 31
End: 2021-06-07
Payer: COMMERCIAL

## 2021-06-07 DIAGNOSIS — M25.531 RIGHT WRIST PAIN: ICD-10-CM

## 2021-06-07 DIAGNOSIS — M25.631 DECREASED RANGE OF MOTION OF RIGHT WRIST: ICD-10-CM

## 2021-06-07 PROCEDURE — 97018 PARAFFIN BATH THERAPY: CPT

## 2021-06-07 PROCEDURE — 97110 THERAPEUTIC EXERCISES: CPT

## 2021-06-07 PROCEDURE — 97140 MANUAL THERAPY 1/> REGIONS: CPT

## 2021-06-15 ENCOUNTER — TELEPHONE (OUTPATIENT)
Dept: ORTHOPEDICS | Facility: CLINIC | Age: 31
End: 2021-06-15

## 2021-06-16 ENCOUNTER — HOSPITAL ENCOUNTER (OUTPATIENT)
Dept: RADIOLOGY | Facility: OTHER | Age: 31
Discharge: HOME | End: 2021-06-16
Attending: PHYSICIAN ASSISTANT
Payer: COMMERCIAL

## 2021-06-16 ENCOUNTER — OFFICE VISIT (OUTPATIENT)
Dept: ORTHOPEDICS | Facility: CLINIC | Age: 31
End: 2021-06-16
Payer: COMMERCIAL

## 2021-06-16 ENCOUNTER — CLINICAL SUPPORT (OUTPATIENT)
Dept: REHABILITATION | Facility: HOSPITAL | Age: 31
End: 2021-06-16
Payer: COMMERCIAL

## 2021-06-16 VITALS — WEIGHT: 172 LBS | BODY MASS INDEX: 27.64 KG/M2 | HEIGHT: 66 IN

## 2021-06-16 DIAGNOSIS — M25.531 RIGHT WRIST PAIN: ICD-10-CM

## 2021-06-16 DIAGNOSIS — S62.021A DISPLACED FRACTURE OF MIDDLE THIRD OF NAVICULAR (SCAPHOID) BONE OF RIGHT WRIST, INITIAL ENCOUNTER FOR CLOSED FRACTURE: ICD-10-CM

## 2021-06-16 DIAGNOSIS — M25.631 DECREASED RANGE OF MOTION OF RIGHT WRIST: ICD-10-CM

## 2021-06-16 DIAGNOSIS — S62.021A DISPLACED FRACTURE OF MIDDLE THIRD OF NAVICULAR (SCAPHOID) BONE OF RIGHT WRIST, INITIAL ENCOUNTER FOR CLOSED FRACTURE: Primary | ICD-10-CM

## 2021-06-16 PROCEDURE — 73110 XR WRIST COMPLETE 3 VIEWS RIGHT: ICD-10-PCS | Mod: 26,RT,, | Performed by: RADIOLOGY

## 2021-06-16 PROCEDURE — 3008F PR BODY MASS INDEX (BMI) DOCUMENTED: ICD-10-PCS | Mod: CPTII,S$GLB,, | Performed by: PHYSICIAN ASSISTANT

## 2021-06-16 PROCEDURE — 99024 PR POST-OP FOLLOW-UP VISIT: ICD-10-PCS | Mod: S$GLB,,, | Performed by: PHYSICIAN ASSISTANT

## 2021-06-16 PROCEDURE — 99024 POSTOP FOLLOW-UP VISIT: CPT | Mod: S$GLB,,, | Performed by: PHYSICIAN ASSISTANT

## 2021-06-16 PROCEDURE — 99999 PR PBB SHADOW E&M-EST. PATIENT-LVL III: CPT | Mod: PBBFAC,,, | Performed by: PHYSICIAN ASSISTANT

## 2021-06-16 PROCEDURE — 97110 THERAPEUTIC EXERCISES: CPT

## 2021-06-16 PROCEDURE — 99999 PR PBB SHADOW E&M-EST. PATIENT-LVL III: ICD-10-PCS | Mod: PBBFAC,,, | Performed by: PHYSICIAN ASSISTANT

## 2021-06-16 PROCEDURE — 1126F AMNT PAIN NOTED NONE PRSNT: CPT | Mod: S$GLB,,, | Performed by: PHYSICIAN ASSISTANT

## 2021-06-16 PROCEDURE — 73110 X-RAY EXAM OF WRIST: CPT | Mod: TC,FY,RT

## 2021-06-16 PROCEDURE — 97140 MANUAL THERAPY 1/> REGIONS: CPT

## 2021-06-16 PROCEDURE — 97018 PARAFFIN BATH THERAPY: CPT | Mod: 59

## 2021-06-16 PROCEDURE — 73110 X-RAY EXAM OF WRIST: CPT | Mod: 26,RT,, | Performed by: RADIOLOGY

## 2021-06-16 PROCEDURE — 3008F BODY MASS INDEX DOCD: CPT | Mod: CPTII,S$GLB,, | Performed by: PHYSICIAN ASSISTANT

## 2021-06-16 PROCEDURE — 1126F PR PAIN SEVERITY QUANTIFIED, NO PAIN PRESENT: ICD-10-PCS | Mod: S$GLB,,, | Performed by: PHYSICIAN ASSISTANT

## 2021-07-20 ENCOUNTER — PATIENT MESSAGE (OUTPATIENT)
Dept: REHABILITATION | Facility: HOSPITAL | Age: 31
End: 2021-07-20

## 2021-07-27 ENCOUNTER — TELEPHONE (OUTPATIENT)
Dept: ORTHOPEDICS | Facility: CLINIC | Age: 31
End: 2021-07-27

## 2021-07-28 ENCOUNTER — HOSPITAL ENCOUNTER (OUTPATIENT)
Dept: RADIOLOGY | Facility: OTHER | Age: 31
Discharge: HOME | End: 2021-07-28
Attending: PHYSICIAN ASSISTANT
Payer: COMMERCIAL

## 2021-07-28 ENCOUNTER — OFFICE VISIT (OUTPATIENT)
Dept: ORTHOPEDICS | Facility: CLINIC | Age: 31
End: 2021-07-28
Payer: COMMERCIAL

## 2021-07-28 VITALS — BODY MASS INDEX: 27.64 KG/M2 | WEIGHT: 172 LBS | HEIGHT: 66 IN

## 2021-07-28 DIAGNOSIS — S62.021A DISPLACED FRACTURE OF MIDDLE THIRD OF NAVICULAR (SCAPHOID) BONE OF RIGHT WRIST, INITIAL ENCOUNTER FOR CLOSED FRACTURE: ICD-10-CM

## 2021-07-28 DIAGNOSIS — S62.021A DISPLACED FRACTURE OF MIDDLE THIRD OF NAVICULAR (SCAPHOID) BONE OF RIGHT WRIST, INITIAL ENCOUNTER FOR CLOSED FRACTURE: Primary | ICD-10-CM

## 2021-07-28 PROCEDURE — 99024 PR POST-OP FOLLOW-UP VISIT: ICD-10-PCS | Mod: S$GLB,,, | Performed by: PHYSICIAN ASSISTANT

## 2021-07-28 PROCEDURE — 99999 PR PBB SHADOW E&M-EST. PATIENT-LVL II: CPT | Mod: PBBFAC,,, | Performed by: PHYSICIAN ASSISTANT

## 2021-07-28 PROCEDURE — 99024 POSTOP FOLLOW-UP VISIT: CPT | Mod: S$GLB,,, | Performed by: PHYSICIAN ASSISTANT

## 2021-07-28 PROCEDURE — 3008F PR BODY MASS INDEX (BMI) DOCUMENTED: ICD-10-PCS | Mod: CPTII,S$GLB,, | Performed by: PHYSICIAN ASSISTANT

## 2021-07-28 PROCEDURE — 1159F MED LIST DOCD IN RCRD: CPT | Mod: CPTII,S$GLB,, | Performed by: PHYSICIAN ASSISTANT

## 2021-07-28 PROCEDURE — 3008F BODY MASS INDEX DOCD: CPT | Mod: CPTII,S$GLB,, | Performed by: PHYSICIAN ASSISTANT

## 2021-07-28 PROCEDURE — 99999 PR PBB SHADOW E&M-EST. PATIENT-LVL II: ICD-10-PCS | Mod: PBBFAC,,, | Performed by: PHYSICIAN ASSISTANT

## 2021-07-28 PROCEDURE — 73110 X-RAY EXAM OF WRIST: CPT | Mod: 26,RT,, | Performed by: RADIOLOGY

## 2021-07-28 PROCEDURE — 73110 XR WRIST COMPLETE 3 VIEWS RIGHT: ICD-10-PCS | Mod: 26,RT,, | Performed by: RADIOLOGY

## 2021-07-28 PROCEDURE — 1159F PR MEDICATION LIST DOCUMENTED IN MEDICAL RECORD: ICD-10-PCS | Mod: CPTII,S$GLB,, | Performed by: PHYSICIAN ASSISTANT

## 2021-07-28 PROCEDURE — 73110 X-RAY EXAM OF WRIST: CPT | Mod: TC,FY,RT

## 2021-07-28 PROCEDURE — 1126F PR PAIN SEVERITY QUANTIFIED, NO PAIN PRESENT: ICD-10-PCS | Mod: CPTII,S$GLB,, | Performed by: PHYSICIAN ASSISTANT

## 2021-07-28 PROCEDURE — 1126F AMNT PAIN NOTED NONE PRSNT: CPT | Mod: CPTII,S$GLB,, | Performed by: PHYSICIAN ASSISTANT

## 2021-08-03 ENCOUNTER — PATIENT MESSAGE (OUTPATIENT)
Dept: ORTHOPEDICS | Facility: CLINIC | Age: 31
End: 2021-08-03

## 2021-08-09 ENCOUNTER — TELEPHONE (OUTPATIENT)
Dept: ORTHOPEDICS | Facility: CLINIC | Age: 31
End: 2021-08-09

## 2021-11-22 ENCOUNTER — IMMUNIZATION (OUTPATIENT)
Dept: INTERNAL MEDICINE | Facility: CLINIC | Age: 31
End: 2021-11-22
Payer: COMMERCIAL

## 2021-11-22 DIAGNOSIS — Z23 NEED FOR VACCINATION: Primary | ICD-10-CM

## 2021-11-22 PROCEDURE — 91300 COVID-19, MRNA, LNP-S, PF, 30 MCG/0.3 ML DOSE VACCINE: CPT | Mod: PBBFAC | Performed by: INTERNAL MEDICINE

## 2021-11-22 PROCEDURE — 0003A COVID-19, MRNA, LNP-S, PF, 30 MCG/0.3 ML DOSE VACCINE: CPT | Mod: PBBFAC | Performed by: INTERNAL MEDICINE

## 2021-12-28 ENCOUNTER — TELEPHONE (OUTPATIENT)
Dept: INTERNAL MEDICINE | Facility: CLINIC | Age: 31
End: 2021-12-28
Payer: COMMERCIAL

## 2022-01-04 ENCOUNTER — OFFICE VISIT (OUTPATIENT)
Dept: DERMATOLOGY | Facility: CLINIC | Age: 32
End: 2022-01-04
Payer: COMMERCIAL

## 2022-01-04 DIAGNOSIS — D22.9 NEVUS: ICD-10-CM

## 2022-01-04 DIAGNOSIS — Z12.83 SCREENING EXAM FOR SKIN CANCER: Primary | ICD-10-CM

## 2022-01-04 DIAGNOSIS — L85.8 KP (KERATOSIS PILARIS): ICD-10-CM

## 2022-01-04 PROCEDURE — 1159F PR MEDICATION LIST DOCUMENTED IN MEDICAL RECORD: ICD-10-PCS | Mod: CPTII,S$GLB,, | Performed by: DERMATOLOGY

## 2022-01-04 PROCEDURE — 99999 PR PBB SHADOW E&M-EST. PATIENT-LVL II: ICD-10-PCS | Mod: PBBFAC,,, | Performed by: DERMATOLOGY

## 2022-01-04 PROCEDURE — 1159F MED LIST DOCD IN RCRD: CPT | Mod: CPTII,S$GLB,, | Performed by: DERMATOLOGY

## 2022-01-04 PROCEDURE — 99203 OFFICE O/P NEW LOW 30 MIN: CPT | Mod: S$GLB,,, | Performed by: DERMATOLOGY

## 2022-01-04 PROCEDURE — 99203 PR OFFICE/OUTPT VISIT, NEW, LEVL III, 30-44 MIN: ICD-10-PCS | Mod: S$GLB,,, | Performed by: DERMATOLOGY

## 2022-01-04 PROCEDURE — 99999 PR PBB SHADOW E&M-EST. PATIENT-LVL II: CPT | Mod: PBBFAC,,, | Performed by: DERMATOLOGY

## 2022-01-04 NOTE — PATIENT INSTRUCTIONS
Etiology of keratosis pilaris discussed and explained the dry skin plugging of the pores.  Recommended salicylic acid washes for now with avoidance of excessively hot water bathing on affected areas.  Use a scrub brush and a loofa.  Can consider moisturizers and natural oils later.    Patient instructed to start Amlactin cream or lotion nightly to AK prone areas or other specified affected areas.  Warned of skin irritation and to decrease frequency of usage if this occurs.

## 2022-01-04 NOTE — PROGRESS NOTES
"  Subjective:       Patient ID:  Roxanne Hardy is a 31 y.o. female who presents for   Chief Complaint   Patient presents with    Skin Check     Patient is a 30 yo female present for TBSE    Patient is here today for a "mole" check.   Pt has a history of  moderate sun exposure in the past.   Pt recalls several blistering sunburns in the past- no  Pt has history of tanning bed use- no  Pt has  had moles removed in the past- no  Pt has history of melanoma in first degree relatives-  no    + fam hx of SCC father on nose.  The patient denies any moles or growths of the skin that are rapidly growing, hurting, itching, bleeding, or changing colors.  Hx of CIU - takes antihistamines if needed - saw allergist, no etiology. Well managed at this point.    Review of Systems   Skin: Positive for activity-related sunscreen use. Negative for daily sunscreen use, recent sunburn and wears hat.   Hematologic/Lymphatic: Does not bruise/bleed easily.        Objective:    Physical Exam   Constitutional: She appears well-developed and well-nourished.   Neurological: She is alert and oriented to person, place, and time.   Psychiatric: She has a normal mood and affect.   Skin:   Areas Examined (abnormalities noted in diagram):   Scalp / Hair Palpated and Inspected  Head / Face Inspection Performed  Neck Inspection Performed  Chest / Axilla Inspection Performed  Abdomen Inspection Performed  Genitals / Buttocks / Groin Inspection Performed  Back Inspection Performed  RUE Inspected  LUE Inspection Performed  RLE Inspected  LLE Inspection Performed  Nails and Digits Inspection Performed                   Diagram Legend     Erythematous scaling macule/papule c/w actinic keratosis       Vascular papule c/w angioma      Pigmented verrucoid papule/plaque c/w seborrheic keratosis      Yellow umbilicated papule c/w sebaceous hyperplasia      Irregularly shaped tan macule c/w lentigo     1-2 mm smooth white papules consistent with Milia      " Movable subcutaneous cyst with punctum c/w epidermal inclusion cyst      Subcutaneous movable cyst c/w pilar cyst      Firm pink to brown papule c/w dermatofibroma      Pedunculated fleshy papule(s) c/w skin tag(s)      Evenly pigmented macule c/w junctional nevus     Mildly variegated pigmented, slightly irregular-bordered macule c/w mildly atypical nevus      Flesh colored to evenly pigmented papule c/w intradermal nevus       Pink pearly papule/plaque c/w basal cell carcinoma      Erythematous hyperkeratotic cursted plaque c/w SCC      Surgical scar with no sign of skin cancer recurrence      Open and closed comedones      Inflammatory papules and pustules      Verrucoid papule consistent consistent with wart     Erythematous eczematous patches and plaques     Dystrophic onycholytic nail with subungual debris c/w onychomycosis     Umbilicated papule    Erythematous-base heme-crusted tan verrucoid plaque consistent with inflamed seborrheic keratosis     Erythematous Silvery Scaling Plaque c/w Psoriasis     See annotation      Assessment / Plan:        Screening exam for skin cancer      Total body skin examination performed today including at least 12 points as noted in physical examination. No lesions suspicious for malignancy noted.    Recommend daily sun protection/avoidance, use of at least SPF 30, broad spectrum sunscreen (OTC drug), skin self examinations, and routine physician surveillance to optimize early detection    KP (keratosis pilaris)  amlactin  Emollients    Nevus, scalp   Discussed ABCDE's of nevi.  Monitor for new mole or moles that are becoming bigger, darker, irritated, or developing irregular borders.  Instructed patient to observe lesion(s) for changes and follow up in clinic if changes are noted. Patient to monitor skin at home for new or changing lesions.                  Follow up in about 1 year (around 1/4/2023).

## 2022-01-31 ENCOUNTER — PATIENT MESSAGE (OUTPATIENT)
Dept: ORTHOPEDICS | Facility: CLINIC | Age: 32
End: 2022-01-31
Payer: COMMERCIAL

## 2022-01-31 DIAGNOSIS — M25.531 RIGHT WRIST PAIN: Primary | ICD-10-CM

## 2022-03-28 ENCOUNTER — OFFICE VISIT (OUTPATIENT)
Dept: OPTOMETRY | Facility: CLINIC | Age: 32
End: 2022-03-28
Payer: COMMERCIAL

## 2022-03-28 DIAGNOSIS — H10.89 GPC (GIANT PAPILLARY CONJUNCTIVITIS): ICD-10-CM

## 2022-03-28 DIAGNOSIS — Z46.0 FITTING AND ADJUSTMENT OF SPECTACLES AND CONTACT LENSES: ICD-10-CM

## 2022-03-28 DIAGNOSIS — H52.203 MYOPIA WITH ASTIGMATISM, BILATERAL: Primary | ICD-10-CM

## 2022-03-28 DIAGNOSIS — Z46.0 FITTING AND ADJUSTMENT OF SPECTACLES AND CONTACT LENSES: Primary | ICD-10-CM

## 2022-03-28 DIAGNOSIS — H52.13 MYOPIA WITH ASTIGMATISM, BILATERAL: Primary | ICD-10-CM

## 2022-03-28 DIAGNOSIS — Z04.9 DISEASE RULED OUT AFTER EXAMINATION: ICD-10-CM

## 2022-03-28 PROCEDURE — 99999 PR PBB SHADOW E&M-EST. PATIENT-LVL II: CPT | Mod: PBBFAC,,, | Performed by: OPTOMETRIST

## 2022-03-28 PROCEDURE — 1159F MED LIST DOCD IN RCRD: CPT | Mod: CPTII,S$GLB,, | Performed by: OPTOMETRIST

## 2022-03-28 PROCEDURE — 92014 COMPRE OPH EXAM EST PT 1/>: CPT | Mod: S$GLB,,, | Performed by: OPTOMETRIST

## 2022-03-28 PROCEDURE — 92310 PR CONTACT LENS FITTING (NO CHANGE): ICD-10-PCS | Mod: CSM,,, | Performed by: OPTOMETRIST

## 2022-03-28 PROCEDURE — 92310 CONTACT LENS FITTING OU: CPT | Mod: CSM,,, | Performed by: OPTOMETRIST

## 2022-03-28 PROCEDURE — 99999 PR PBB SHADOW E&M-EST. PATIENT-LVL II: ICD-10-PCS | Mod: PBBFAC,,, | Performed by: OPTOMETRIST

## 2022-03-28 PROCEDURE — 92015 DETERMINE REFRACTIVE STATE: CPT | Mod: S$GLB,,, | Performed by: OPTOMETRIST

## 2022-03-28 PROCEDURE — 1159F PR MEDICATION LIST DOCUMENTED IN MEDICAL RECORD: ICD-10-PCS | Mod: CPTII,S$GLB,, | Performed by: OPTOMETRIST

## 2022-03-28 PROCEDURE — 1160F PR REVIEW ALL MEDS BY PRESCRIBER/CLIN PHARMACIST DOCUMENTED: ICD-10-PCS | Mod: CPTII,S$GLB,, | Performed by: OPTOMETRIST

## 2022-03-28 PROCEDURE — 1160F RVW MEDS BY RX/DR IN RCRD: CPT | Mod: CPTII,S$GLB,, | Performed by: OPTOMETRIST

## 2022-03-28 PROCEDURE — 92014 PR EYE EXAM, EST PATIENT,COMPREHESV: ICD-10-PCS | Mod: S$GLB,,, | Performed by: OPTOMETRIST

## 2022-03-28 PROCEDURE — 92015 PR REFRACTION: ICD-10-PCS | Mod: S$GLB,,, | Performed by: OPTOMETRIST

## 2022-03-28 NOTE — PROGRESS NOTES
HPI     30 Y/o female is here for routine eye exam and CTLF with  no C/o about   ocular health   Pt denies pain and discomfort   No f/f     Eye med:no gtt     Last edited by Ye Kelly MA on 3/28/2022  8:57 AM. (History)            Assessment /Plan     For exam results, see Encounter Report.          Myopia with astigmatism, bilateral  Fitting and adjustment of spectacles and contact lenses  Rx specs  CL fit today: Continue with current dailies, may need to change astig OS        GPC (giant papillary conjunctivitis)  Pt reports it has improved since switching to dailies  Use Pataday extra strength daily PRN     Disease ruled out after examination              Good overall ocular health, monitor yearly     RTC 1 year, sooner PRN

## 2022-04-08 DIAGNOSIS — Z98.890 POST-OPERATIVE STATE: Primary | ICD-10-CM

## 2022-04-12 ENCOUNTER — CLINICAL SUPPORT (OUTPATIENT)
Dept: REHABILITATION | Facility: HOSPITAL | Age: 32
End: 2022-04-12
Payer: COMMERCIAL

## 2022-04-12 DIAGNOSIS — M25.572 ACUTE LEFT ANKLE PAIN: ICD-10-CM

## 2022-04-12 PROCEDURE — 97162 PT EVAL MOD COMPLEX 30 MIN: CPT | Performed by: PHYSICAL THERAPIST

## 2022-04-12 PROCEDURE — 97112 NEUROMUSCULAR REEDUCATION: CPT | Performed by: PHYSICAL THERAPIST
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
## 2022-04-14 ENCOUNTER — CLINICAL SUPPORT (OUTPATIENT)
Dept: REHABILITATION | Facility: HOSPITAL | Age: 32
End: 2022-04-14
Payer: COMMERCIAL

## 2022-04-14 DIAGNOSIS — M25.572 ACUTE LEFT ANKLE PAIN: Primary | ICD-10-CM

## 2022-04-14 PROCEDURE — 97112 NEUROMUSCULAR REEDUCATION: CPT

## 2022-04-14 PROCEDURE — 97110 THERAPEUTIC EXERCISES: CPT

## 2022-04-14 PROCEDURE — 97116 GAIT TRAINING THERAPY: CPT

## 2022-04-14 PROCEDURE — 97140 MANUAL THERAPY 1/> REGIONS: CPT

## 2022-04-14 NOTE — PROGRESS NOTES
[facility]   Physical Therapy Treatment Note     Name: Roxanne Hardy  Clinic Number: [id number]    Therapy Diagnosis:   Encounter Diagnosis   Name Primary?    Acute left ankle pain Yes     Physician: Kaveh Bobo DO    Visit Date: 4/14/2022  Physician Orders: PT Eval and Treat   Medical Diagnosis from Referral: Z98.890 (ICD-10-CM) - Post-operative state  Evaluation Date: 4/12/2022  Authorization Period Expiration: 12/31/2022  Plan of Care Expiration: 10/12/2022  Visit # / Visits authorized: 1/20    PTA Visit #: 0/5     FOTO first follow up:   FOTO second follow up:     Time In: 10:58 am   Time Out: 11:50  Total Billable Time: 52 minutes    Precautions: Weightbearing      Date of Surgery: 3/3/2022     Plan:  Nonweightbearing for 6 weeks will leave her in a splint until she is seen back in clinic and then switch her to a cast for an additional 4 weeks and then 2 weeks of partial weight-bearing in a Cam boot using 1-2 crutches. At a total of 8 weeks postop we can our to fully weightbear as tolerated in a Cam boot for 2 weeks. At 10 weeks postop she can weight bear as tolerated in a regular shoe if healing is appropriate.  We will see her back in clinic in 2 weeks see plan above.    SUBJECTIVE     Pt reports: She is doing pretty good, she went to the [employer] game last night and used her scooter for at the game. .  She was compliant with home exercise program.  Response to previous treatment: Positive   Functional change: Ongoing     Pain: 0/10  Location: left ankle       OBJECTIVE     Objective Measures updated at progress report unless specified.     Observation 4/14/2022: Patient ambulates into the clinic with CAM boot donned and step-to gait pattern with bilateral crutches.     Ankle Range of Motion:  - Dorsiflexion: -5 degrees   - Plantarflexion: 30 degrees     Treatment     Roxanne received the treatments listed below:      therapeutic exercises to develop strength, endurance, ROM and  flexibility for 13 minutes including:    Pt education on continuation with HEP, gait mechanics, and weight bearing status   Ankle ABCs 2x   Sidelying clamshells with GreenTB 3x12    manual therapy techniques: Joint mobilizations, Soft tissue Mobilization and scar mobs were applied to the: Left ankle for 18 minutes, including:    Talocrural anterior to posterior mobilizations in open packed position grade III   Subtalar mobilizations grade III  Great toe mobilizations grade III  Metatarsal splaying   Scar mobilizations     neuromuscular re-education activities to improve: Balance, Coordination, Kinesthetic and Sense for 9 minutes. The following activities were included:    Towel crunches 3 min   Arch lifts 2x10 with 3 sec holds   Weight shifts on scale with CAM boot working up to 75-80 lbs with 3 sec holds     therapeutic activities to improve functional performance for 0 minutes, including:    gait training to improve functional mobility and safety for 12 minutes, including:    Using bilateral crutches and utilizing CAM boot:  -Step-to gait pattern 4x15 feet   -Step-through gait pattern 6x30 feet       Patient Education and Home Exercises     Home Exercises Provided and Patient Education Provided     Education provided:   - Continue with HEP  - CAM boot with step through gait pattern     Written Home Exercises Provided: Patient instructed to cont prior HEP. Exercises were reviewed and Roxanne was able to demonstrate them prior to the end of the session.  Roxanne demonstrated good  understanding of the education provided. See EMR under Patient Instructions for exercises provided during therapy sessions    ASSESSMENT     Roxanne tolerated first physical therapy session following initial evaluation well. She presented with decreased ankle joint mobility and further manual therapy intervention to help improve and help with weight bearing tolerance. Continued work utilizing scale to help with partial weight bearing status. She  was progressed to step-through gait pattern today with no adverse effects. She was educated to continue with HEP and proper step through gait pattern to continue to progress. Will continue to monitor and progress as able according to protocol.     Roxanne Is progressing well towards her goals.   Pt prognosis is Good.     Pt will continue to benefit from skilled outpatient physical therapy to address the deficits listed in the problem list box on initial evaluation, provide pt/family education and to maximize pt's level of independence in the home and community environment.     Pt's spiritual, cultural and educational needs considered and pt agreeable to plan of care and goals.     Anticipated barriers to physical therapy: Work schedule     Goals:   Short Term Goals:  6 weeks (progressing, not met)  1.Report decreased ankle pain  < / =  2/10  to increase tolerance for ambulating in tennis shoes  2. Increase ROM to 5 degrees DF in order to walk with min to no compensation.  3. Increase strength by 1/3 MMT grade for ankle intrinsic  to increase tolerance for ADL and work activities.  4. Pt to tolerate HEP to improve ROM and independence with ADL's     Long Term Goals: 18 weeks (progressing, not met)   1.Report decreased ankle pain  < / =  0/10  to increase tolerance for return to work 12 hour shift pain free  2.Patient goal: return to working without ankle pain   3.Increase strength to 4+/5 for  Ankle PF/DF  to increase tolerance for ADL and work activities.  4. Pt will report at CJ level (20-40% impaired) on Modified FIM score for mobility to demonstrate increase in LE function and mobility in home and community environment.     PLAN   Plan of care Certification: 4/12/2022 to 10/12/2022.    Continue with current plan of care with emphasis on ankle mobility, proximal strengthening, and gait mechanics.     Alisia Caruso, PT   Co-treated by: Mark Hall, PT

## 2022-04-18 ENCOUNTER — CLINICAL SUPPORT (OUTPATIENT)
Dept: REHABILITATION | Facility: HOSPITAL | Age: 32
End: 2022-04-18
Payer: COMMERCIAL

## 2022-04-18 DIAGNOSIS — M25.572 ACUTE LEFT ANKLE PAIN: Primary | ICD-10-CM

## 2022-04-18 PROCEDURE — 97112 NEUROMUSCULAR REEDUCATION: CPT | Performed by: PHYSICAL THERAPIST

## 2022-04-18 PROCEDURE — 97140 MANUAL THERAPY 1/> REGIONS: CPT | Performed by: PHYSICAL THERAPIST

## 2022-04-18 NOTE — PROGRESS NOTES
[facility]   Physical Therapy Treatment Note     Name: Roxanne Hardy  Clinic Number: [id number]    Therapy Diagnosis:   Encounter Diagnosis   Name Primary?    Acute left ankle pain Yes     Physician: Kaveh Bobo DO    Visit Date: 4/18/2022  Physician Orders: PT Eval and Treat   Medical Diagnosis from Referral: Z98.890 (ICD-10-CM) - Post-operative state  Evaluation Date: 4/12/2022  Authorization Period Expiration: 12/31/2022  Plan of Care Expiration: 10/12/2022  Visit # / Visits authorized: 2/20    PTA Visit #: 0/5     FOTO first follow up:   FOTO second follow up:     Time In: 0900 am   Time Out: 1000 am  Total Billable Time: 28 minutes    Precautions: Weightbearing      Date of Surgery: 3/3/2022     Plan:  Nonweightbearing for 6 weeks will leave her in a splint until she is seen back in clinic and then switch her to a cast for an additional 4 weeks and then 2 weeks of partial weight-bearing in a Cam boot using 1-2 crutches. At a total of 8 weeks postop we can our to fully weightbear as tolerated in a Cam boot for 2 weeks. At 10 weeks postop she can weight bear as tolerated in a regular shoe if healing is appropriate.  We will see her back in clinic in 2 weeks see plan above.    SUBJECTIVE     Pt reports: Better tolerance to the exercises, less swelling and discoloration in my ankle.   She was compliant with home exercise program.  Response to previous treatment: Improved gait tolerance  Functional change: Step to gait with axillary crutches    Pain: 0/10  Location: left ankle       OBJECTIVE     Objective Measures updated at progress report unless specified.     Observation 4/18/2022: Patient ambulates into the clinic with CAM boot donned and step-to gait pattern with bilateral crutches.     Ankle Range of Motion:  - Dorsiflexion: -2 degrees   - Plantarflexion: 30 degrees     Treatment     Roxanne received the treatments listed below:      therapeutic exercises to develop strength,  endurance, ROM and flexibility for 13 minutes including:    Pt education on continuation with HEP, gait mechanics, and weight bearing status   Sidelying hip abduction 2 x 15   Bike 8' for 1 mile RLE cardio endurance training     manual therapy techniques: Joint mobilizations, Soft tissue Mobilization and scar mobs were applied to the: Left ankle for 18 minutes, including:    Talocrural anterior to posterior mobilizations in open packed position grade III   Subtalar mobilizations grade III  Great toe mobilizations grade III  Metatarsal splaying   Scar mobilizations     neuromuscular re-education activities to improve: Balance, Coordination, Kinesthetic and Sense for 29 minutes. The following activities were included:    Towel crunches 3 min   YTB Great toe flexion 3'  YTB ankle PF 3'  Isometrics at wall 10 sec 10x    NT  Arch lifts 2x10 with 3 sec holds   Weight shifts on scale with CAM boot working up to 75-80 lbs with 3 sec holds     therapeutic activities to improve functional performance for 0 minutes, including:    gait training to improve functional mobility and safety for 0 minutes, including:    Using bilateral crutches and utilizing CAM boot:  -Step-to gait pattern 4x15 feet   -Step-through gait pattern 6x30 feet       Patient Education and Home Exercises     Home Exercises Provided and Patient Education Provided     Education provided:   - Continue with HEP  - CAM boot with step through gait pattern     Written Home Exercises Provided: Patient instructed to cont prior HEP. Exercises were reviewed and Roxanne was able to demonstrate them prior to the end of the session.  Roxanne demonstrated good  understanding of the education provided. See EMR under Patient Instructions for exercises provided during therapy sessions    ASSESSMENT     Roxanne progressed with ankle mobility exercises. Performed PF gastroc and soleus with yellow theraband. Glut fatigued today with hip abd, cues to avoid hip ER. Bike at end of  treatment for cardio endurance training.    Roxanne Is progressing well towards her goals.   Pt prognosis is Good.     Pt will continue to benefit from skilled outpatient physical therapy to address the deficits listed in the problem list box on initial evaluation, provide pt/family education and to maximize pt's level of independence in the home and community environment.     Pt's spiritual, cultural and educational needs considered and pt agreeable to plan of care and goals.     Anticipated barriers to physical therapy: Work schedule     Goals:   Short Term Goals:  6 weeks (progressing, not met)  1.Report decreased ankle pain  < / =  2/10  to increase tolerance for ambulating in tennis shoes  2. Increase ROM to 5 degrees DF in order to walk with min to no compensation.  3. Increase strength by 1/3 MMT grade for ankle intrinsic  to increase tolerance for ADL and work activities.  4. Pt to tolerate HEP to improve ROM and independence with ADL's     Long Term Goals: 18 weeks (progressing, not met)   1.Report decreased ankle pain  < / =  0/10  to increase tolerance for return to work 12 hour shift pain free  2.Patient goal: return to working without ankle pain   3.Increase strength to 4+/5 for  Ankle PF/DF  to increase tolerance for ADL and work activities.  4. Pt will report at CJ level (20-40% impaired) on Modified FIM score for mobility to demonstrate increase in LE function and mobility in home and community environment.     PLAN   Plan of care Certification: 4/12/2022 to 10/12/2022.    Continue with current plan of care with emphasis on ankle mobility, proximal strengthening, and gait mechanics.     Rick Manjarrez, PT   Co-treated by: Mark Hall PT

## 2022-04-22 ENCOUNTER — CLINICAL SUPPORT (OUTPATIENT)
Dept: REHABILITATION | Facility: HOSPITAL | Age: 32
End: 2022-04-22
Payer: COMMERCIAL

## 2022-04-22 DIAGNOSIS — M25.572 ACUTE LEFT ANKLE PAIN: Primary | ICD-10-CM

## 2022-04-22 PROCEDURE — 97140 MANUAL THERAPY 1/> REGIONS: CPT | Performed by: PHYSICAL THERAPIST

## 2022-04-22 PROCEDURE — 97112 NEUROMUSCULAR REEDUCATION: CPT | Performed by: PHYSICAL THERAPIST

## 2022-04-22 PROCEDURE — 97110 THERAPEUTIC EXERCISES: CPT | Performed by: PHYSICAL THERAPIST

## 2022-04-22 NOTE — PROGRESS NOTES
[facility]   Physical Therapy Treatment Note     Name: Roxanne Hardy  Clinic Number: [id number]    Therapy Diagnosis:   Encounter Diagnosis   Name Primary?    Acute left ankle pain Yes     Physician: Kaveh Bobo DO    Visit Date: 4/22/2022  Physician Orders: PT Eval and Treat   Medical Diagnosis from Referral: Z98.890 (ICD-10-CM) - Post-operative state  Evaluation Date: 4/12/2022  Authorization Period Expiration: 12/31/2022  Plan of Care Expiration: 10/12/2022  Visit # / Visits authorized: 3/20    PTA Visit #: 0/5     FOTO first follow up:   FOTO second follow up:     Time In: 0845 am   Time Out: 0954 am  Total Billable Time: 69 minutes    Precautions: Weightbearing      Date of Surgery: 3/3/2022     Plan:  Nonweightbearing for 6 weeks will leave her in a splint until she is seen back in clinic and then switch her to a cast for an additional 4 weeks and then 2 weeks of partial weight-bearing in a Cam boot using 1-2 crutches. At a total of 8 weeks postop we can our to fully weightbear as tolerated in a Cam boot for 2 weeks. At 10 weeks postop she can weight bear as tolerated in a regular shoe if healing is appropriate.  We will see her back in clinic in 2 weeks see plan above.    4/28: we can our to fully weightbear as tolerated in a Cam boot for 2 weeks  5/12: she can weight bear as tolerated in a regular shoe if healing is appropriate.    SUBJECTIVE     Pt reports: work went well, used my scooter the second day. Reports the ankle is improving, more independent and able to move her foot more with exercises.   She was compliant with home exercise program.  Response to previous treatment: Improved gait tolerance  Functional change: Step to gait with axillary crutches    Pain: 0/10  Location: left ankle       OBJECTIVE     Objective Measures updated at progress report unless specified.     Observation 4/22/2022: Patient ambulates into the clinic with CAM boot donned and step-to gait  pattern with bilateral crutches. Incision well healed as of today.     Ankle Range of Motion:  - Dorsiflexion: -2 degrees   - Plantarflexion: 42 degrees   - Inversion: 18 degrees  - Eversion: 8 degrees    Treatment     Roxanne received the treatments listed below:      therapeutic exercises to develop strength, endurance, ROM and flexibility for 28 minutes including:    Pt education on continuation with HEP, gait mechanics, and weight bearing status   GSS stretch strep 30 sec 5x  Sidelying hip abduction 2 x 10  Sidelying hip ER 2 x 15  Shuttle 2 bands 2 x 20  NT  Bike 8' for 1 mile RLE cardio endurance training     manual therapy techniques: Joint mobilizations, Soft tissue Mobilization and scar mobs were applied to the: Left ankle for 20 minutes, including:    Talocrural anterior to posterior mobilizations in open packed position grade III   Subtalar mobilizations grade III  Great toe mobilizations grade III  Metatarsal splaying   Scar mobilizations     neuromuscular re-education activities to improve: Balance, Coordination, Kinesthetic and Sense for 21 minutes. The following activities were included:    Brdiges 2 x 15  YTB ankle PF/inv/ev 3'  Fitter board 4 direction 2' each    NT  Isometrics at wall 10 sec 10x  Towel crunches 3 min   YTB Great toe flexion 3'  Arch lifts 2x10 with 3 sec holds   Weight shifts on scale with CAM boot working up to 75-80 lbs with 3 sec holds     therapeutic activities to improve functional performance for 0 minutes, including:    gait training to improve functional mobility and safety for 0 minutes, including:    Using bilateral crutches and utilizing CAM boot:  -Step-to gait pattern 4x15 feet   -Step-through gait pattern 6x30 feet       Patient Education and Home Exercises     Home Exercises Provided and Patient Education Provided     Education provided:   - Continue with HEP  - CAM boot with step through gait pattern     Written Home Exercises Provided: Patient instructed to cont prior  HEP. Exercises were reviewed and Roxanne was able to demonstrate them prior to the end of the session.  Roxanne demonstrated good  understanding of the education provided. See EMR under Patient Instructions for exercises provided during therapy sessions    ASSESSMENT     Roxanne with improved ankle ROM upon arrival. Limited mid foot with cavitation and relief afterwards. Patient progressed with fitter board, good proprioceptive control. Began bridges and shuttle squats today, improving functional motion. She began 3 point step through gait with improved gait speed and control    Roxanne Is progressing well towards her goals.   Pt prognosis is Good.     Pt will continue to benefit from skilled outpatient physical therapy to address the deficits listed in the problem list box on initial evaluation, provide pt/family education and to maximize pt's level of independence in the home and community environment.     Pt's spiritual, cultural and educational needs considered and pt agreeable to plan of care and goals.     Anticipated barriers to physical therapy: Work schedule     Goals:   Short Term Goals:  6 weeks (progressing, not met)  1.Report decreased ankle pain  < / =  2/10  to increase tolerance for ambulating in tennis shoes  2. Increase ROM to 5 degrees DF in order to walk with min to no compensation.  3. Increase strength by 1/3 MMT grade for ankle intrinsic  to increase tolerance for ADL and work activities.  4. Pt to tolerate HEP to improve ROM and independence with ADL's     Long Term Goals: 18 weeks (progressing, not met)   1.Report decreased ankle pain  < / =  0/10  to increase tolerance for return to work 12 hour shift pain free  2.Patient goal: return to working without ankle pain   3.Increase strength to 4+/5 for  Ankle PF/DF  to increase tolerance for ADL and work activities.  4. Pt will report at CJ level (20-40% impaired) on Modified FIM score for mobility to demonstrate increase in LE function and mobility in home  and community environment.     PLAN   Plan of care Certification: 4/12/2022 to 10/12/2022.    Continue with current plan of care with emphasis on ankle mobility, proximal strengthening, and gait mechanics.     Rick Manjarrez PT   Co-treated by: Mark Hall PT

## 2022-04-28 ENCOUNTER — CLINICAL SUPPORT (OUTPATIENT)
Dept: REHABILITATION | Facility: HOSPITAL | Age: 32
End: 2022-04-28
Payer: COMMERCIAL

## 2022-04-28 DIAGNOSIS — M25.572 ACUTE LEFT ANKLE PAIN: Primary | ICD-10-CM

## 2022-04-28 PROCEDURE — 97112 NEUROMUSCULAR REEDUCATION: CPT | Performed by: PHYSICAL THERAPIST

## 2022-04-28 PROCEDURE — 97110 THERAPEUTIC EXERCISES: CPT | Performed by: PHYSICAL THERAPIST

## 2022-04-28 NOTE — PROGRESS NOTES
[facility]   Physical Therapy Treatment Note     Name: Roxanne Hardy  Clinic Number: [id number]    Therapy Diagnosis:   Encounter Diagnosis   Name Primary?    Acute left ankle pain Yes     Physician: Kaveh Bobo DO    Visit Date: 4/28/2022  Physician Orders: PT Eval and Treat   Medical Diagnosis from Referral: Z98.890 (ICD-10-CM) - Post-operative state  Evaluation Date: 4/12/2022  Authorization Period Expiration: 12/31/2022  Plan of Care Expiration: 10/12/2022  Visit # / Visits authorized: 4/20    PTA Visit #: 0/5     FOTO first follow up:   FOTO second follow up:     Time In: 1300 pm   Time Out: 1400 pm  Total Billable Time: 60 minutes    Precautions: Weightbearing      Date of Surgery: 3/3/2022     Plan:  Nonweightbearing for 6 weeks will leave her in a splint until she is seen back in clinic and then switch her to a cast for an additional 4 weeks and then 2 weeks of partial weight-bearing in a Cam boot using 1-2 crutches. At a total of 8 weeks postop we can our to fully weightbear as tolerated in a Cam boot for 2 weeks. At 10 weeks postop she can weight bear as tolerated in a regular shoe if healing is appropriate.  We will see her back in clinic in 2 weeks see plan above.    4/28: we can our to fully weightbear as tolerated in a Cam boot for 2 weeks  5/12: she can weight bear as tolerated in a regular shoe if healing is appropriate.    SUBJECTIVE     Pt reports: Excited to weightbear today. Notes motion getting better.     She was compliant with home exercise program.  Response to previous treatment: Improved gait tolerance  Functional change: Step to gait with axillary crutches    Pain: 0/10  Location: left ankle       OBJECTIVE     Objective Measures updated at progress report unless specified.     Observation 4/22/2022: Patient ambulates into the clinic with CAM boot donned and step-to gait pattern with bilateral crutches. Incision well healed as of today.     Ankle Range of  Motion:  - Dorsiflexion: -2 degrees   - Plantarflexion: 42 degrees   - Inversion: 18 degrees  - Eversion: 8 degrees    Treatment     Roxanne received the treatments listed below:      therapeutic exercises to develop strength, endurance, ROM and flexibility for 28 minutes including:    Pt education on continuation with HEP, gait mechanics, and weight bearing status   Gait training - WBAT with bilaterl axillary crutches 3 laps turf   DF stretch seated in chair 10 sec 20x    NT  GSS stretch strep 30 sec 5x  Sidelying hip abduction 2 x 10  Sidelying hip ER 2 x 15  Shuttle 2 bands 2 x 20    Bike 8' for 1 mile RLE cardio endurance training     manual therapy techniques: Joint mobilizations, Soft tissue Mobilization and scar mobs were applied to the: Left ankle for 20 minutes, including:    Talocrural anterior to posterior mobilizations in open packed position grade III   Subtalar mobilizations grade III  Great toe mobilizations grade III  Metatarsal splaying   Scar mobilizations     neuromuscular re-education activities to improve: Balance, Coordination, Kinesthetic and Sense for 21 minutes. The following activities were included:    YTB ankle PF/inv/ev 3'  Shuttle 1 1/2 bands DL 3'  Shuttle 1/2 band SL on L 3'  Heel raises 1/2 band DL 3'     NT  Fitter board 4 direction 2' each  Bridges 2 x 15  Isometrics at wall 10 sec 10x  Towel crunches 3 min   YTB Great toe flexion 3'  Arch lifts 2x10 with 3 sec holds   Weight shifts on scale with CAM boot working up to 75-80 lbs with 3 sec holds     therapeutic activities to improve functional performance for 0 minutes, including:    gait training to improve functional mobility and safety for 0 minutes, including:    Using bilateral crutches and utilizing CAM boot:  -Step-to gait pattern 4x15 feet   -Step-through gait pattern 6x30 feet       Patient Education and Home Exercises     Home Exercises Provided and Patient Education Provided     Education provided:   - Continue with HEP  -  CAM boot with step through gait pattern     Written Home Exercises Provided: Patient instructed to cont prior HEP. Exercises were reviewed and Roxanne was able to demonstrate them prior to the end of the session.  Roxanne demonstrated good  understanding of the education provided. See EMR under Patient Instructions for exercises provided during therapy sessions    ASSESSMENT     Roxanne progressed to WBAT with bilateral axillary crutches. She notes building confidence with weightbearing as she went through exercises. Will progress DF, better mob ability today in supine.     Roxanne Is progressing well towards her goals.   Pt prognosis is Good.     Pt will continue to benefit from skilled outpatient physical therapy to address the deficits listed in the problem list box on initial evaluation, provide pt/family education and to maximize pt's level of independence in the home and community environment.     Pt's spiritual, cultural and educational needs considered and pt agreeable to plan of care and goals.     Anticipated barriers to physical therapy: Work schedule     Goals:   Short Term Goals:  6 weeks (progressing, not met)  1.Report decreased ankle pain  < / =  2/10  to increase tolerance for ambulating in tennis shoes  2. Increase ROM to 5 degrees DF in order to walk with min to no compensation.  3. Increase strength by 1/3 MMT grade for ankle intrinsic  to increase tolerance for ADL and work activities.  4. Pt to tolerate HEP to improve ROM and independence with ADL's     Long Term Goals: 18 weeks (progressing, not met)   1.Report decreased ankle pain  < / =  0/10  to increase tolerance for return to work 12 hour shift pain free  2.Patient goal: return to working without ankle pain   3.Increase strength to 4+/5 for  Ankle PF/DF  to increase tolerance for ADL and work activities.  4. Pt will report at CJ level (20-40% impaired) on Modified FIM score for mobility to demonstrate increase in LE function and mobility in home and  community environment.     PLAN   Plan of care Certification: 4/12/2022 to 10/12/2022.    Continue with current plan of care with emphasis on ankle mobility, proximal strengthening, and gait mechanics.     Rick Manjarrez PT   Co-treated by: Mark Hall PT

## 2022-05-06 ENCOUNTER — CLINICAL SUPPORT (OUTPATIENT)
Dept: REHABILITATION | Facility: HOSPITAL | Age: 32
End: 2022-05-06
Payer: COMMERCIAL

## 2022-05-06 DIAGNOSIS — M25.572 ACUTE LEFT ANKLE PAIN: Primary | ICD-10-CM

## 2022-05-06 PROCEDURE — 97140 MANUAL THERAPY 1/> REGIONS: CPT | Performed by: PHYSICAL THERAPIST

## 2022-05-06 PROCEDURE — 97112 NEUROMUSCULAR REEDUCATION: CPT | Performed by: PHYSICAL THERAPIST

## 2022-05-06 PROCEDURE — 97110 THERAPEUTIC EXERCISES: CPT | Performed by: PHYSICAL THERAPIST

## 2022-05-06 NOTE — PROGRESS NOTES
[facility]   Physical Therapy Treatment Note     Name: Roxanne Hardy  Clinic Number: [id number]    Therapy Diagnosis:   Encounter Diagnosis   Name Primary?    Acute left ankle pain Yes     Physician: Kaveh Bobo DO    Visit Date: 5/6/2022  Physician Orders: PT Eval and Treat   Medical Diagnosis from Referral: Z98.890 (ICD-10-CM) - Post-operative state  Evaluation Date: 4/12/2022  Authorization Period Expiration: 12/31/2022  Plan of Care Expiration: 10/12/2022  Visit # / Visits authorized: 5/20    PTA Visit #: 0/5     FOTO first follow up:   FOTO second follow up:     Time In: 0800am   Time Out: 0910m  Total Billable Time: 70 minutes    Precautions: Weightbearing      Date of Surgery: 3/3/2022     Plan:  Nonweightbearing for 6 weeks will leave her in a splint until she is seen back in clinic and then switch her to a cast for an additional 4 weeks and then 2 weeks of partial weight-bearing in a Cam boot using 1-2 crutches. At a total of 8 weeks postop we can our to fully weightbear as tolerated in a Cam boot for 2 weeks. At 10 weeks postop she can weight bear as tolerated in a regular shoe if healing is appropriate.  We will see her back in clinic in 2 weeks see plan above.    4/28: we can our to fully weightbear as tolerated in a Cam boot for 2 weeks  5/12: she can weight bear as tolerated in a regular shoe if healing is appropriate.    SUBJECTIVE     Pt reports: Worked Mon-Thurs. Getting used to weightbearing through the heel. Notes the lateral side of the foot feels like it's crushing and won't hold her body weight     She was compliant with home exercise program.  Response to previous treatment: Improved gait tolerance  Functional change: Step to gait with axillary crutches    Pain: 0/10  Location: left ankle       OBJECTIVE     Objective Measures updated at progress report unless specified.     Observation 5/6/2022: Presents WBAT with bilateral axillary crutches. Brought with her  tennis shoes     Ankle Range of Motion:  - Dorsiflexion: 0 degrees   - Plantarflexion: 42 degrees   - Inversion: 18 degrees  - Eversion: 8 degrees    Treatment     Roxanne received the treatments listed below:      therapeutic exercises to develop strength, endurance, ROM and flexibility for 20 minutes including:    Pt education on continuation with HEP, gait mechanics, and weight bearing status   Bike 5' for cardio endurance training and ankle ROM   AlterG ambulatiion 50% BW with gait feedback on device - 12'    NT  Gait training - WBAT with bilaterl axillary crutches 3 laps turf   DF stretch seated in chair 10 sec 20x  GSS stretch strep 30 sec 5x  Sidelying hip abduction 2 x 10  Sidelying hip ER 2 x 15  Shuttle 2 bands 2 x 20    Manual therapy techniques: Joint mobilizations, Soft tissue Mobilization and scar mobs were applied to the: Left ankle for 25 minutes, including:    Talocrural anterior to posterior mobilizations in open packed position grade III   Subtalar mobilizations grade III  Great toe mobilizations grade III  Metatarsal splaying   Scar mobilizations   Mulligans ankle dorsiflexion and posterior/ER/superior glide   Sidelying dorsiflexion with proximal fibular PA mob     Neuromuscular re-education activities to improve: Balance, Coordination, Kinesthetic and Sense for 25 minutes. The following activities were included:    Self dorsiflexion 1/2 kneeling gray band 20x 10 sec hold  Seated heel raises 10# KB on knee 2 x 15    NT  YTB ankle PF/inv/ev 3'  Shuttle 1 1/2 bands DL 3'  Shuttle 1/2 band SL on L 3'  Heel raises 1/2 band DL 3'   Fitter board 4 direction 2' each  Bridges 2 x 15  Isometrics at wall 10 sec 10x  Towel crunches 3 min   YTB Great toe flexion 3'  Arch lifts 2x10 with 3 sec holds   Weight shifts on scale with CAM boot working up to 75-80 lbs with 3 sec holds     therapeutic activities to improve functional performance for 0 minutes, including:    gait training to improve functional mobility  and safety for 0 minutes, including:    Using bilateral crutches and utilizing CAM boot:  -Step-to gait pattern 4x15 feet   -Step-through gait pattern 6x30 feet       Patient Education and Home Exercises     Home Exercises Provided and Patient Education Provided     Education provided:   - Continue with HEP  - CAM boot with step through gait pattern     Written Home Exercises Provided: Patient instructed to cont prior HEP. Exercises were reviewed and Roxanne was able to demonstrate them prior to the end of the session.  Roxanne demonstrated good  understanding of the education provided. See EMR under Patient Instructions for exercises provided during therapy sessions    ASSESSMENT     Roxanne presented to clinic with quicker gait speed and confidence putting body weight through CAM boot. Began ambulation in tennis shoes on alterG at 50% weightbearing with feedback from machine to step length and weightbearing time on each extremity. Notes GSS fatigue with heel raises seated. Given band to begin self dorsiflexion stretch as she only gets 8 deg of closed chain DF in 1/2 kneeling position    Roxanne Is progressing well towards her goals.   Pt prognosis is Good.     Pt will continue to benefit from skilled outpatient physical therapy to address the deficits listed in the problem list box on initial evaluation, provide pt/family education and to maximize pt's level of independence in the home and community environment.     Pt's spiritual, cultural and educational needs considered and pt agreeable to plan of care and goals.     Anticipated barriers to physical therapy: Work schedule     Goals:   Short Term Goals:  6 weeks (progressing, not met)  1.Report decreased ankle pain  < / =  2/10  to increase tolerance for ambulating in tennis shoes  2. Increase ROM to 5 degrees DF in order to walk with min to no compensation.  3. Increase strength by 1/3 MMT grade for ankle intrinsic  to increase tolerance for ADL and work activities.  4. Pt  to tolerate HEP to improve ROM and independence with ADL's     Long Term Goals: 18 weeks (progressing, not met)   1.Report decreased ankle pain  < / =  0/10  to increase tolerance for return to work 12 hour shift pain free  2.Patient goal: return to working without ankle pain   3.Increase strength to 4+/5 for  Ankle PF/DF  to increase tolerance for ADL and work activities.  4. Pt will report at CJ level (20-40% impaired) on Modified FIM score for mobility to demonstrate increase in LE function and mobility in home and community environment.     PLAN   Plan of care Certification: 4/12/2022 to 10/12/2022.    Continue with current plan of care with emphasis on ankle mobility, proximal strengthening, and gait mechanics.     Rick Manjarrez, PT   Co-treated by: Mark Hall PT

## 2022-05-09 ENCOUNTER — CLINICAL SUPPORT (OUTPATIENT)
Dept: REHABILITATION | Facility: HOSPITAL | Age: 32
End: 2022-05-09
Payer: COMMERCIAL

## 2022-05-09 ENCOUNTER — PATIENT MESSAGE (OUTPATIENT)
Dept: INTERNAL MEDICINE | Facility: CLINIC | Age: 32
End: 2022-05-09
Payer: COMMERCIAL

## 2022-05-09 DIAGNOSIS — M25.572 ACUTE LEFT ANKLE PAIN: Primary | ICD-10-CM

## 2022-05-09 PROCEDURE — 97140 MANUAL THERAPY 1/> REGIONS: CPT | Performed by: PHYSICAL THERAPIST

## 2022-05-09 PROCEDURE — 97112 NEUROMUSCULAR REEDUCATION: CPT | Performed by: PHYSICAL THERAPIST

## 2022-05-09 PROCEDURE — 97110 THERAPEUTIC EXERCISES: CPT | Performed by: PHYSICAL THERAPIST

## 2022-05-10 NOTE — PROGRESS NOTES
[facility]   Physical Therapy Treatment Note     Name: Roxanne Hardy  Clinic Number: [id number]    Therapy Diagnosis:   Encounter Diagnosis   Name Primary?    Acute left ankle pain Yes     Physician: Kaveh Bobo DO    Visit Date: 5/9/2022  Physician Orders: PT Eval and Treat   Medical Diagnosis from Referral: Z98.890 (ICD-10-CM) - Post-operative state  Evaluation Date: 4/12/2022  Authorization Period Expiration: 12/31/2022  Plan of Care Expiration: 10/12/2022  Visit # / Visits authorized: 6/20    PTA Visit #: 0/5     FOTO first follow up:   FOTO second follow up:     Time In: 0904am   Time Out: 1010am  Total Billable Time: 66 minutes    Precautions: Weightbearing      Date of Surgery: 3/3/2022     Plan:  Nonweightbearing for 6 weeks will leave her in a splint until she is seen back in clinic and then switch her to a cast for an additional 4 weeks and then 2 weeks of partial weight-bearing in a Cam boot using 1-2 crutches. At a total of 8 weeks postop we can our to fully weightbear as tolerated in a Cam boot for 2 weeks. At 10 weeks postop she can weight bear as tolerated in a regular shoe if healing is appropriate.  We will see her back in clinic in 2 weeks see plan above.    4/28: we can our to fully weightbear as tolerated in a Cam boot for 2 weeks  5/12: she can weight bear as tolerated in a regular shoe if healing is appropriate.    SUBJECTIVE     Pt reports: I was able to go to [employer] [facility]. Mostly walking with boot and 1 crutch, just better speed than without assistive device. It was sore but responded well to last session    She was compliant with home exercise program.  Response to previous treatment: Improved gait tolerance  Functional change: Step to gait with axillary crutches    Pain: 0/10  Location: left ankle       OBJECTIVE     Objective Measures updated at progress report unless specified.     Observation 5/9/2022: Presents WBAT with bilateral axillary crutches.  Ambulates well with single crutch. Brought with her tennis shoes     Ankle Range of Motion:  - Dorsiflexion: 0 degrees   - Plantarflexion: 42 degrees   - Inversion: 18 degrees  - Eversion: 8 degrees    Treatment     Roxanne received the treatments listed below:      therapeutic exercises to develop strength, endurance, ROM and flexibility for 20 minutes including:    Pt education on continuation with HEP, gait mechanics, and weight bearing status   AlterG ambulatiion 50% BW  1.0 speed with gait feedback on device - 15'    NT  Gait training - WBAT with bilaterl axillary crutches 3 laps turf   DF stretch seated in chair 10 sec 20x  GSS stretch strep 30 sec 5x  Sidelying hip abduction 2 x 10  Sidelying hip ER 2 x 15  Shuttle 2 bands 2 x 20    Manual therapy techniques: Joint mobilizations, Soft tissue Mobilization and scar mobs were applied to the: Left ankle for 15 minutes, including:    Talocrural anterior to posterior mobilizations in open packed position grade III   Subtalar mobilizations grade III  Great toe mobilizations grade III  Metatarsal splaying   Scar mobilizations   Mulligans ankle dorsiflexion and posterior/ER/superior glide   Sidelying dorsiflexion with proximal fibular PA mob     Neuromuscular re-education activities to improve: Balance, Coordination, Kinesthetic and Sense for 31 minutes. The following activities were included:    Self dorsiflexion 1/2 kneeling 10 sec 10x  Seated heel raises 10# KB on knee 2 x 15  Bridges supine 30x  Wall squats endurance holds 30x  Ambulation single crutch- cues glut med and avoid hip IR    NT  YTB ankle PF/inv/ev 3'  Shuttle 1 1/2 bands DL 3'  Shuttle 1/2 band SL on L 3'  Heel raises 1/2 band DL 3'   Fitter board 4 direction 2' each  Bridges 2 x 15  Isometrics at wall 10 sec 10x  Towel crunches 3 min   YTB Great toe flexion 3'  Arch lifts 2x10 with 3 sec holds   Weight shifts on scale with CAM boot working up to 75-80 lbs with 3 sec holds     therapeutic activities to  improve functional performance for 0 minutes, including:    gait training to improve functional mobility and safety for 0 minutes, including:    Using bilateral crutches and utilizing CAM boot:  -Step-to gait pattern 4x15 feet   -Step-through gait pattern 6x30 feet       Patient Education and Home Exercises     Home Exercises Provided and Patient Education Provided     Education provided:   - Continue with HEP  - CAM boot with step through gait pattern     Written Home Exercises Provided: Patient instructed to cont prior HEP. Exercises were reviewed and Roxanne was able to demonstrate them prior to the end of the session.  Roxanne demonstrated good  understanding of the education provided. See EMR under Patient Instructions for exercises provided during therapy sessions    ASSESSMENT     Roxanne progressed her weightbearing in clinic. Still unsteady without crutches, requires one and educated on need to add more single crutch and no crutch training at home. She was able to ambulate on the alterG at a quicker pace and better 50/50 weight distribution. Continue to work on endurance in closed chain. Dorsiflexion to neutral upon arrival    Roxanne Is progressing well towards her goals.   Pt prognosis is Good.     Pt will continue to benefit from skilled outpatient physical therapy to address the deficits listed in the problem list box on initial evaluation, provide pt/family education and to maximize pt's level of independence in the home and community environment.     Pt's spiritual, cultural and educational needs considered and pt agreeable to plan of care and goals.     Anticipated barriers to physical therapy: Work schedule     Goals:   Short Term Goals:  6 weeks (progressing, not met)  1.Report decreased ankle pain  < / =  2/10  to increase tolerance for ambulating in tennis shoes  2. Increase ROM to 5 degrees DF in order to walk with min to no compensation.  3. Increase strength by 1/3 MMT grade for ankle intrinsic  to  increase tolerance for ADL and work activities.  4. Pt to tolerate HEP to improve ROM and independence with ADL's     Long Term Goals: 18 weeks (progressing, not met)   1.Report decreased ankle pain  < / =  0/10  to increase tolerance for return to work 12 hour shift pain free  2.Patient goal: return to working without ankle pain   3.Increase strength to 4+/5 for  Ankle PF/DF  to increase tolerance for ADL and work activities.  4. Pt will report at CJ level (20-40% impaired) on Modified FIM score for mobility to demonstrate increase in LE function and mobility in home and community environment.     PLAN   Plan of care Certification: 4/12/2022 to 10/12/2022.    Continue with current plan of care with emphasis on ankle mobility, proximal strengthening, and gait mechanics.     Rick Manjarrez, PT

## 2022-05-12 ENCOUNTER — CLINICAL SUPPORT (OUTPATIENT)
Dept: REHABILITATION | Facility: HOSPITAL | Age: 32
End: 2022-05-12
Payer: COMMERCIAL

## 2022-05-12 DIAGNOSIS — M25.572 ACUTE LEFT ANKLE PAIN: Primary | ICD-10-CM

## 2022-05-12 PROCEDURE — 97140 MANUAL THERAPY 1/> REGIONS: CPT

## 2022-05-12 PROCEDURE — 97112 NEUROMUSCULAR REEDUCATION: CPT

## 2022-05-12 PROCEDURE — 97110 THERAPEUTIC EXERCISES: CPT

## 2022-05-12 NOTE — PROGRESS NOTES
[facility]   Physical Therapy Treatment Note     Name: Roxanne Hardy  Clinic Number: [id number]    Therapy Diagnosis:   Encounter Diagnosis   Name Primary?    Acute left ankle pain Yes     Physician: Kaveh Bobo DO    Visit Date: 5/12/2022  Physician Orders: PT Eval and Treat   Medical Diagnosis from Referral: Z98.890 (ICD-10-CM) - Post-operative state  Evaluation Date: 4/12/2022  Authorization Period Expiration: 12/31/2022  Plan of Care Expiration: 10/12/2022  Visit # / Visits authorized: 7/20    PTA Visit #: 0/5     FOTO first follow up:   FOTO second follow up:     Time In: 0900am   Time Out: 1000am  Total Billable Time: 60 minutes    Precautions: Weightbearing      Date of Surgery: 3/3/2022     Plan:  Nonweightbearing for 6 weeks will leave her in a splint until she is seen back in clinic and then switch her to a cast for an additional 4 weeks and then 2 weeks of partial weight-bearing in a Cam boot using 1-2 crutches. At a total of 8 weeks postop we can our to fully weightbear as tolerated in a Cam boot for 2 weeks. At 10 weeks postop she can weight bear as tolerated in a regular shoe if healing is appropriate.  We will see her back in clinic in 2 weeks see plan above.    4/28: we can our to fully weightbear as tolerated in a Cam boot for 2 weeks  5/12: she can weight bear as tolerated in a regular shoe if healing is appropriate.    SUBJECTIVE     Pt reports: she feeling fine. Some pain on the side of the foot and general stiffness.      She was compliant with home exercise program.  Response to previous treatment: Improved gait tolerance  Functional change: Step to gait with axillary crutches    Pain: 0/10  Location: left ankle       OBJECTIVE     Objective Measures updated at progress report unless specified.     Observation 5/12/2022: Presents WBAT with bilateral axillary crutches. Ambulates well with single crutch. Brought with her tennis shoes     Ankle Range of Motion:  -  Dorsiflexion: 5 degrees (post manual)   - Plantarflexion: 50 degrees (post manual)  - Inversion: 18 degrees  - Eversion: 8 degrees    Treatment     Roxanne received the treatments listed below:      therapeutic exercises to develop strength, endurance, ROM and flexibility for 20 minutes including:    Pt education on continuation with HEP, gait mechanics, and weight bearing status   AlterG ambulatiion 50% BW  1.0 speed with gait feedback on device - 10'    NT  Gait training - WBAT with bilaterl axillary crutches 3 laps turf   DF stretch seated in chair 10 sec 20x  GSS stretch strep 30 sec 5x  Sidelying hip abduction 2 x 10  Sidelying hip ER 2 x 15  Shuttle 2 bands 2 x 20    Manual therapy techniques: Joint mobilizations, Soft tissue Mobilization and scar mobs were applied to the: Left ankle for 15 minutes, including:    Talocrural anterior to posterior mobilizations in open packed position grade IV  Subtalar mobilizations grade IV  Great toe mobilizations grade III  Metatarsal splaying   Scar mobilizations   Mulligans ankle dorsiflexion and posterior/ER/superior glide - NT  Sidelying dorsiflexion with proximal fibular PA mob -NT    Neuromuscular re-education activities to improve: Balance, Coordination, Kinesthetic and Sense for 25 minutes. The following activities were included:    Self dorsiflexion 1/2 kneeling 10 sec 10x  Seated heel - toe raises 10# KB on knee 2 x 15  Mini Squats 3 x 10 with R foot anterior   Side Steps over mini cones - touch down support on mat; 6 laps     NT  Bridges supine 30x  Wall squats endurance holds 30x  Ambulation single crutch- cues glut med and avoid hip IR  YTB ankle PF/inv/ev 3'  Shuttle 1 1/2 bands DL 3'  Shuttle 1/2 band SL on L 3'  Heel raises 1/2 band DL 3'   Fitter board 4 direction 2' each  Bridges 2 x 15  Isometrics at wall 10 sec 10x  Towel crunches 3 min   YTB Great toe flexion 3'  Arch lifts 2x10 with 3 sec holds   Weight shifts on scale with CAM boot working up to 75-80 lbs  with 3 sec holds     therapeutic activities to improve functional performance for 0 minutes, including:    gait training to improve functional mobility and safety for 0 minutes, including:    Using bilateral crutches and utilizing CAM boot:  -Step-to gait pattern 4x15 feet   -Step-through gait pattern 6x30 feet       Patient Education and Home Exercises     Home Exercises Provided and Patient Education Provided     Education provided:   - Continue with HEP  - CAM boot with step through gait pattern     Written Home Exercises Provided: Patient instructed to cont prior HEP. Exercises were reviewed and Roxanne was able to demonstrate them prior to the end of the session.  Roxanne demonstrated good  understanding of the education provided. See EMR under Patient Instructions for exercises provided during therapy sessions    ASSESSMENT     Roxanne progressed her weightbearing in clinic. Continuing to utilize manual intervention to improve ROM. DF improved by 5 degrees today and PF improved to 50. CKC exercises added with mini squats and hurdles. Pt needs touchdown support to maintain balance.      Roxanne Is progressing well towards her goals.   Pt prognosis is Good.     Pt will continue to benefit from skilled outpatient physical therapy to address the deficits listed in the problem list box on initial evaluation, provide pt/family education and to maximize pt's level of independence in the home and community environment.     Pt's spiritual, cultural and educational needs considered and pt agreeable to plan of care and goals.     Anticipated barriers to physical therapy: Work schedule     Goals:   Short Term Goals:  6 weeks (progressing, not met)  1.Report decreased ankle pain  < / =  2/10  to increase tolerance for ambulating in tennis shoes  2. Increase ROM to 5 degrees DF in order to walk with min to no compensation.  3. Increase strength by 1/3 MMT grade for ankle intrinsic  to increase tolerance for ADL and work activities.  4.  Pt to tolerate HEP to improve ROM and independence with ADL's     Long Term Goals: 18 weeks (progressing, not met)   1.Report decreased ankle pain  < / =  0/10  to increase tolerance for return to work 12 hour shift pain free  2.Patient goal: return to working without ankle pain   3.Increase strength to 4+/5 for  Ankle PF/DF  to increase tolerance for ADL and work activities.  4. Pt will report at CJ level (20-40% impaired) on Modified FIM score for mobility to demonstrate increase in LE function and mobility in home and community environment.     PLAN   Plan of care Certification: 4/12/2022 to 10/12/2022.    Continue with current plan of care with emphasis on ankle mobility, proximal strengthening, and gait mechanics.     Jose Juan Douglas, PT

## 2022-05-19 ENCOUNTER — CLINICAL SUPPORT (OUTPATIENT)
Dept: REHABILITATION | Facility: HOSPITAL | Age: 32
End: 2022-05-19
Payer: COMMERCIAL

## 2022-05-19 DIAGNOSIS — M25.572 ACUTE LEFT ANKLE PAIN: Primary | ICD-10-CM

## 2022-05-19 PROCEDURE — 97140 MANUAL THERAPY 1/> REGIONS: CPT | Performed by: PHYSICAL THERAPIST

## 2022-05-19 PROCEDURE — 97110 THERAPEUTIC EXERCISES: CPT | Performed by: PHYSICAL THERAPIST

## 2022-05-19 PROCEDURE — 97112 NEUROMUSCULAR REEDUCATION: CPT | Performed by: PHYSICAL THERAPIST

## 2022-05-19 NOTE — PROGRESS NOTES
[facility]   Physical Therapy Treatment Note     Name: Roxanne Hardy  Clinic Number: [id number]    Therapy Diagnosis:   Encounter Diagnosis   Name Primary?    Acute left ankle pain Yes     Physician: Kaveh Bobo DO    Visit Date: 5/19/2022  Physician Orders: PT Eval and Treat   Medical Diagnosis from Referral: Z98.890 (ICD-10-CM) - Post-operative state  Evaluation Date: 4/12/2022  Authorization Period Expiration: 12/31/2022  Plan of Care Expiration: 10/12/2022  Visit # / Visits authorized: 8/20    PTA Visit #: 0/5     FOTO first follow up:   FOTO second follow up:     Time In: 1446pm   Time Out: 1600pm  Total Billable Time: 63 minutes    Precautions: Weightbearing      Date of Surgery: 3/3/2022     Plan:  Nonweightbearing for 6 weeks will leave her in a splint until she is seen back in clinic and then switch her to a cast for an additional 4 weeks and then 2 weeks of partial weight-bearing in a Cam boot using 1-2 crutches. At a total of 8 weeks postop we can our to fully weightbear as tolerated in a Cam boot for 2 weeks. At 10 weeks postop she can weight bear as tolerated in a regular shoe if healing is appropriate.  We will see her back in clinic in 2 weeks see plan above.    4/28: we can our to fully weightbear as tolerated in a Cam boot for 2 weeks  5/12: she can weight bear as tolerated in a regular shoe if healing is appropriate.    SUBJECTIVE     Pt reports: she feeling fine. Some pain on the side of the foot and general stiffness.      She was compliant with home exercise program.  Response to previous treatment: Improved gait tolerance  Functional change: Step to gait with axillary crutches    Pain: 0/10  Location: left ankle       OBJECTIVE     Objective Measures updated at progress report unless specified.     Observation 5/12/2022: Presents WBAT with bilateral axillary crutches. Ambulates well with single crutch. Brought with her tennis shoes     Ankle Range of Motion:  -  Dorsiflexion: 5 degrees (post manual)   - Plantarflexion: 50 degrees (post manual)  - Inversion: 18 degrees  - Eversion: 8 degrees    Treatment     Roxanne received the treatments listed below:      therapeutic exercises to develop strength, endurance, ROM and flexibility for 24 minutes including:    Pt education on continuation with HEP, gait mechanics, and weight bearing status   Sidelying hip ER no weight 2 x 15  TKE sport cord cues reduce hip IR 2 x 20  Assisted heel raise purple sport cord 3x to fatigue  AlterG ambulatiion 50% BW  1.0 speed with gait feedback on device - 10'    NT  Gait training - WBAT with bilaterl axillary crutches 3 laps turf   DF stretch seated in chair 10 sec 20x  GSS stretch strep 30 sec 5x  Sidelying hip abduction 2 x 10  Sidelying hip ER 2 x 15  Shuttle 2 bands 2 x 20    Manual therapy techniques: Joint mobilizations, Soft tissue Mobilization and scar mobs were applied to the: Left ankle for 15 minutes, including:    Talocrural anterior to posterior mobilizations in open packed position grade IV  Subtalar mobilizations grade IV  Great toe mobilizations grade III  Metatarsal splaying   Scar mobilizations   Mulligans ankle dorsiflexion and posterior/ER/superior glide - NT  Sidelying dorsiflexion with proximal fibular PA mob -NT    Neuromuscular re-education activities to improve: Balance, Coordination, Kinesthetic and Sense for 24 minutes. The following activities were included:    Fitter board 1' 4 directions   Seated heel - toe raises 15# KB on knee 2 x 15  Weight shifts on airex 30x  Gait training heel strike/toe off over hurdles with cues for IR 5 laps    NT  Bridges supine 30x  Wall squats endurance holds 30x  Ambulation single crutch- cues glut med and avoid hip IR  YTB ankle PF/inv/ev 3'  Shuttle 1 1/2 bands DL 3'  Shuttle 1/2 band SL on L 3'  Heel raises 1/2 band DL 3'   Fitter board 4 direction 2' each  Bridges 2 x 15  Isometrics at wall 10 sec 10x  Towel crunches 3 min   YTB Great  toe flexion 3'  Arch lifts 2x10 with 3 sec holds   Weight shifts on scale with CAM boot working up to 75-80 lbs with 3 sec holds     therapeutic activities to improve functional performance for 0 minutes, including:    gait training to improve functional mobility and safety for 0 minutes, including:    Using bilateral crutches and utilizing CAM boot:  -Step-to gait pattern 4x15 feet   -Step-through gait pattern 6x30 feet       Patient Education and Home Exercises     Home Exercises Provided and Patient Education Provided     Education provided:   - Continue with HEP  - CAM boot with step through gait pattern     Written Home Exercises Provided: Patient instructed to cont prior HEP. Exercises were reviewed and Roxanne was able to demonstrate them prior to the end of the session.  Roxanne demonstrated good  understanding of the education provided. See EMR under Patient Instructions for exercises provided during therapy sessions    ASSESSMENT     Roxanne progressed weightbearing. Used video feedback for reduced internal rotation. Notes gastroc soleus fatigue with assisted heel raise. Rockerboard squats well tolerated with feedback for equal weight distribution. Finished with hip ER for strengthening. Notes her lateral ankle soreness, needs to push off big toe       Roxanne Is progressing well towards her goals.   Pt prognosis is Good.     Pt will continue to benefit from skilled outpatient physical therapy to address the deficits listed in the problem list box on initial evaluation, provide pt/family education and to maximize pt's level of independence in the home and community environment.     Pt's spiritual, cultural and educational needs considered and pt agreeable to plan of care and goals.     Anticipated barriers to physical therapy: Work schedule     Goals:   Short Term Goals:  6 weeks (progressing, not met)  1.Report decreased ankle pain  < / =  2/10  to increase tolerance for ambulating in tennis shoes  2. Increase ROM to  5 degrees DF in order to walk with min to no compensation.  3. Increase strength by 1/3 MMT grade for ankle intrinsic  to increase tolerance for ADL and work activities.  4. Pt to tolerate HEP to improve ROM and independence with ADL's     Long Term Goals: 18 weeks (progressing, not met)   1.Report decreased ankle pain  < / =  0/10  to increase tolerance for return to work 12 hour shift pain free  2.Patient goal: return to working without ankle pain   3.Increase strength to 4+/5 for  Ankle PF/DF  to increase tolerance for ADL and work activities.  4. Pt will report at CJ level (20-40% impaired) on Modified FIM score for mobility to demonstrate increase in LE function and mobility in home and community environment.     PLAN   Plan of care Certification: 4/12/2022 to 10/12/2022.    Continue with current plan of care with emphasis on ankle mobility, proximal strengthening, and gait mechanics.     Rick Manjarrez, PT

## 2022-05-25 ENCOUNTER — CLINICAL SUPPORT (OUTPATIENT)
Dept: REHABILITATION | Facility: HOSPITAL | Age: 32
End: 2022-05-25
Payer: COMMERCIAL

## 2022-05-25 DIAGNOSIS — M25.572 ACUTE LEFT ANKLE PAIN: Primary | ICD-10-CM

## 2022-05-25 PROCEDURE — 97112 NEUROMUSCULAR REEDUCATION: CPT | Performed by: PHYSICAL THERAPIST

## 2022-05-25 PROCEDURE — 97110 THERAPEUTIC EXERCISES: CPT | Performed by: PHYSICAL THERAPIST

## 2022-05-25 PROCEDURE — 97140 MANUAL THERAPY 1/> REGIONS: CPT | Performed by: PHYSICAL THERAPIST

## 2022-05-25 NOTE — PROGRESS NOTES
[facility]   Physical Therapy Treatment Note     Name: Roxanne Hardy  Clinic Number: [id number]    Therapy Diagnosis:   Encounter Diagnosis   Name Primary?    Acute left ankle pain Yes     Physician: Kaveh Bobo DO    Visit Date: 5/25/2022  Physician Orders: PT Eval and Treat   Medical Diagnosis from Referral: Z98.890 (ICD-10-CM) - Post-operative state  Evaluation Date: 4/12/2022  Authorization Period Expiration: 12/31/2022  Plan of Care Expiration: 10/12/2022  Visit # / Visits authorized: 9/20    PTA Visit #: 0/5     FOTO first follow up:   FOTO second follow up:     Time In: 800am   Time Out: 900am  Total Billable Time: 60 minutes    Precautions: Weightbearing      Date of Surgery: 3/3/2022     Plan:  Nonweightbearing for 6 weeks will leave her in a splint until she is seen back in clinic and then switch her to a cast for an additional 4 weeks and then 2 weeks of partial weight-bearing in a Cam boot using 1-2 crutches. At a total of 8 weeks postop we can our to fully weightbear as tolerated in a Cam boot for 2 weeks. At 10 weeks postop she can weight bear as tolerated in a regular shoe if healing is appropriate.  We will see her back in clinic in 2 weeks see plan above.     4/28: we can our to fully weightbear as tolerated in a Cam boot for 2 weeks.   5/12: she can weight bear as tolerated in a regular shoe if healing is appropriate.    SUBJECTIVE     Pt reports: she feeling more stable on her foot. No soreness following last session.      She was compliant with home exercise program.  Response to previous treatment: Improved gait tolerance  Functional change: Step to gait with axillary crutches    Pain: 0/10  Location: left ankle       OBJECTIVE     Objective Measures updated at progress report unless specified.     Observation 5/12/2022: Presents WBAT with bilateral axillary crutches. Ambulates well with single crutch. Brought with her tennis shoes     Ankle Range of Motion:  -  Dorsiflexion: 7 degrees (post manual)   - Plantarflexion: 50 degrees (post manual)  - Inversion: 18 degrees  - Eversion: 8 degrees    Treatment     Roxanne received the treatments listed below:      therapeutic exercises to develop strength, endurance, ROM and flexibility for 30 minutes including:    Pt education on continuation with HEP, gait mechanics, and weight bearing status   Sidelying hip ER 2# 2 x 15  AlterG ambulatiion 70% BW  1.5 speed with gait feedback on device - 10'  AlterG ambulatiion 40% BW  3 x 10 Heel raises    NT  TKE sport cord cues reduce hip IR 2 x 20  Assisted heel raise purple sport cord 3x to fatigue  Gait training - WBAT with bilaterl axillary crutches 3 laps turf   DF stretch seated in chair 10 sec 20x  GSS stretch strep 30 sec 5x  Sidelying hip abduction 2 x 10  Sidelying hip ER 2 x 15  Shuttle 2 bands 2 x 20    Manual therapy techniques: Joint mobilizations, Soft tissue Mobilization and scar mobs were applied to the: Left ankle for 15 minutes, including:    Ankle ROM Assessment  TC manipulation; grade 5 - pt agreeable   Talocrural anterior to posterior mobilizations in open packed position grade IV  Subt  Not today:   Subtalar mobilizations grade IV  Great toe mobilizations grade III  Metatarsal splaying   Scar mobilizations   Mulligans ankle dorsiflexion and posterior/ER/superior glide - NT  Sidelying dorsiflexion with proximal fibular PA mob -NT    Neuromuscular re-education activities to improve: Balance, Coordination, Kinesthetic and Sense for 15 minutes. The following activities were included:    Hurdles - Lateral and Forward; 8 minutes   SL Clamshells x15; 10 sec holds LLE only  Mini Squats 0-40 with YTB around knees 3 x 10; R foot forward      NT  Fitter board 1' 4 directions   Seated heel - toe raises 15# KB on knee 2 x 15  Weight shifts on airex 30x  Gait training heel strike/toe off over hurdles with cues for IR 5 laps  Bridges supine 30x  Wall squats endurance holds  30x  Ambulation single crutch- cues glut med and avoid hip IR  YTB ankle PF/inv/ev 3'  Shuttle 1 1/2 bands DL 3'  Shuttle 1/2 band SL on L 3'  Heel raises 1/2 band DL 3'   Fitter board 4 direction 2' each  Bridges 2 x 15  Isometrics at wall 10 sec 10x  Towel crunches 3 min   YTB Great toe flexion 3'  Arch lifts 2x10 with 3 sec holds   Weight shifts on scale with CAM boot working up to 75-80 lbs with 3 sec holds     therapeutic activities to improve functional performance for 0 minutes, including:    gait training to improve functional mobility and safety for 0 minutes, including:    Using bilateral crutches and utilizing CAM boot:  -Step-to gait pattern 4x15 feet   -Step-through gait pattern 6x30 feet       Patient Education and Home Exercises     Home Exercises Provided and Patient Education Provided     Education provided:   - Continue with HEP  - CAM boot with step through gait pattern     Written Home Exercises Provided: Patient instructed to cont prior HEP. Exercises were reviewed and Roxanne was able to demonstrate them prior to the end of the session.  Roxanne demonstrated good  understanding of the education provided. See EMR under Patient Instructions for exercises provided during therapy sessions    ASSESSMENT     Roxanne continues to improve weightbearing tolerance. Pt reported soreness at the conclusion of Alter G ambulation. Pt to see MD today for f/u. AlterG ankle strengthening exercises should be utilized in the future for pt comfort and objective tracking of weightbearing tolerance.     Roxanne Is progressing well towards her goals.   Pt prognosis is Good.     Pt will continue to benefit from skilled outpatient physical therapy to address the deficits listed in the problem list box on initial evaluation, provide pt/family education and to maximize pt's level of independence in the home and community environment.     Pt's spiritual, cultural and educational needs considered and pt agreeable to plan of care and  goals.     Anticipated barriers to physical therapy: Work schedule     Goals:   Short Term Goals:  6 weeks (progressing, not met)  1.Report decreased ankle pain  < / =  2/10  to increase tolerance for ambulating in tennis shoes  2. Increase ROM to 5 degrees DF in order to walk with min to no compensation.  3. Increase strength by 1/3 MMT grade for ankle intrinsic  to increase tolerance for ADL and work activities.  4. Pt to tolerate HEP to improve ROM and independence with ADL's     Long Term Goals: 18 weeks (progressing, not met)   1.Report decreased ankle pain  < / =  0/10  to increase tolerance for return to work 12 hour shift pain free  2.Patient goal: return to working without ankle pain   3.Increase strength to 4+/5 for  Ankle PF/DF  to increase tolerance for ADL and work activities.  4. Pt will report at CJ level (20-40% impaired) on Modified FIM score for mobility to demonstrate increase in LE function and mobility in home and community environment.     PLAN   Plan of care Certification: 4/12/2022 to 10/12/2022.    Continue with current plan of care with emphasis on ankle mobility, proximal strengthening, and gait mechanics.     Pt co-treated with Rick Manjarrez PT, DPT, OCS     Jose Juan Douglas, PT

## 2022-05-31 ENCOUNTER — CLINICAL SUPPORT (OUTPATIENT)
Dept: REHABILITATION | Facility: HOSPITAL | Age: 32
End: 2022-05-31
Payer: COMMERCIAL

## 2022-05-31 DIAGNOSIS — M25.572 ACUTE LEFT ANKLE PAIN: Primary | ICD-10-CM

## 2022-05-31 PROCEDURE — 97112 NEUROMUSCULAR REEDUCATION: CPT | Performed by: PHYSICAL THERAPIST

## 2022-05-31 PROCEDURE — 97110 THERAPEUTIC EXERCISES: CPT | Performed by: PHYSICAL THERAPIST

## 2022-05-31 PROCEDURE — 97140 MANUAL THERAPY 1/> REGIONS: CPT | Performed by: PHYSICAL THERAPIST

## 2022-05-31 NOTE — PROGRESS NOTES
[facility]   Physical Therapy Treatment Note     Name: Roxanne Hardy  Clinic Number: [id number]    Therapy Diagnosis:   Encounter Diagnosis   Name Primary?    Acute left ankle pain Yes     Physician: Kaveh Bobo DO    Visit Date: 5/31/2022  Physician Orders: PT Eval and Treat   Medical Diagnosis from Referral: Z98.890 (ICD-10-CM) - Post-operative state  Evaluation Date: 4/12/2022  Authorization Period Expiration: 12/31/2022  Plan of Care Expiration: 10/12/2022  Visit # / Visits authorized: 10/20    PTA Visit #: 0/5     FOTO first follow up:   FOTO second follow up:     Time In: 850am   Time Out: 1010am  Total Billable Time: 80 minutes    Precautions: Weightbearing      Date of Surgery: 3/3/2022     Plan:  Nonweightbearing for 6 weeks will leave her in a splint until she is seen back in clinic and then switch her to a cast for an additional 4 weeks and then 2 weeks of partial weight-bearing in a Cam boot using 1-2 crutches. At a total of 8 weeks postop we can our to fully weightbear as tolerated in a Cam boot for 2 weeks. At 10 weeks postop she can weight bear as tolerated in a regular shoe if healing is appropriate.  We will see her back in clinic in 2 weeks see plan above.     4/28: we can our to fully weightbear as tolerated in a Cam boot for 2 weeks.   5/12: she can weight bear as tolerated in a regular shoe if healing is appropriate.    SUBJECTIVE     Pt reports: MD said the xray looked good, back to work full time soon and cleared for walking shoes. I was only in the boot for a bday party. Medial knee pain if I walk too much     She was compliant with home exercise program.  Response to previous treatment: Improved gait tolerance  Functional change: Step to gait with axillary crutches    Pain: 0/10  Location: left ankle       OBJECTIVE     Objective Measures updated at progress report unless specified.     Observation 5/12/2022: Presents WBAT with bilateral axillary crutches.  "Ambulates well with single crutch. Brought with her tennis shoes     Ankle Range of Motion:  - Dorsiflexion: 7 degrees (post manual)   - Plantarflexion: 50 degrees (post manual)  - Inversion: 18 degrees  - Eversion: 8 degrees    Treatment     Roxanne received the treatments listed below:      therapeutic exercises to develop strength, endurance, ROM and flexibility for 41 minutes including:    Pt education on continuation with HEP, gait mechanics, and weight bearing status   AlterG ambulatiion 65% BW  1.5 speed with gait feedback on device - 10'  Step up 3" 3 x 15  Standing HS curls 4# 2 x 10  Bridges feet on swiss ball 2 x 10    NT  TKE sport cord cues reduce hip IR 2 x 20  Assisted heel raise purple sport cord 3x to fatigue  Gait training - WBAT with bilaterl axillary crutches 3 laps turf   DF stretch seated in chair 10 sec 20x  GSS stretch strep 30 sec 5x  Sidelying hip abduction 2 x 10  Sidelying hip ER 2 x 15  Shuttle 2 bands 2 x 20    Manual therapy techniques: Joint mobilizations, Soft tissue Mobilization and scar mobs were applied to the: Left ankle for 18 minutes, including:    Ankle ROM Assessment  TC manipulation; grade 5 - pt agreeable   Talocrural anterior to posterior mobilizations in open packed position grade IV  Subtalar mobilizations grade IV  Great toe mobilizations grade III  Metatarsal splaying   Scar mobilizations   Mulligans ankle dorsiflexion and posterior/ER/superior glide   Sidelying dorsiflexion with proximal fibular PA mob -NT    Neuromuscular re-education activities to improve: Balance, Coordination, Kinesthetic and Sense for 21 minutes. The following activities were included:    Prone gastroc/soleus resisted PF 8 sec 5x each  Lateral walk on toes with YTB GSS/ 5 laps with balance beam under heels  SL cone taps 3 x 15  SL balance airex 10 sec 10x  Rockerboard 4 way 1' each     NT  Hurdles - Lateral and Forward; 8 minutes   SL Clamshells x15; 10 sec holds LLE only  Mini Squats 0-40 with YTB " around knees 3 x 10; R foot forward  Fitter board 1' 4 directions   Seated heel - toe raises 15# KB on knee 2 x 15  Weight shifts on airex 30x  Gait training heel strike/toe off over hurdles with cues for IR 5 laps  Bridges supine 30x  Wall squats endurance holds 30x  Ambulation single crutch- cues glut med and avoid hip IR  YTB ankle PF/inv/ev 3'  Shuttle 1 1/2 bands DL 3'  Shuttle 1/2 band SL on L 3'  Heel raises 1/2 band DL 3'   Fitter board 4 direction 2' each  Bridges 2 x 15  Isometrics at wall 10 sec 10x  Towel crunches 3 min   YTB Great toe flexion 3'  Arch lifts 2x10 with 3 sec holds   Weight shifts on scale with CAM boot working up to 75-80 lbs with 3 sec holds     therapeutic activities to improve functional performance for 0 minutes, including:    gait training to improve functional mobility and safety for 0 minutes, including:    Using bilateral crutches and utilizing CAM boot:  -Step-to gait pattern 4x15 feet   -Step-through gait pattern 6x30 feet       Patient Education and Home Exercises     Home Exercises Provided and Patient Education Provided     Education provided:   - Continue with HEP  - CAM boot with step through gait pattern     Written Home Exercises Provided: Patient instructed to cont prior HEP. Exercises were reviewed and Roxanne was able to demonstrate them prior to the end of the session.  Roxanne demonstrated good  understanding of the education provided. See EMR under Patient Instructions for exercises provided during therapy sessions    ASSESSMENT     Roxanne presented with improve ankle joint play today. Most limited dorsiflexion with limited fibular posterior mobility and ER. She still has one small area closing up over incision and needs to improve scar mobility. Patient with limited posterior chain strength, began more HS and prone GSS strengthening. Will continue progressing weightbearing tolerance and gait cycle with cues for toe off for GSS strengthening    Roxanne Is progressing well  towards her goals.   Pt prognosis is Good.     Pt will continue to benefit from skilled outpatient physical therapy to address the deficits listed in the problem list box on initial evaluation, provide pt/family education and to maximize pt's level of independence in the home and community environment.     Pt's spiritual, cultural and educational needs considered and pt agreeable to plan of care and goals.     Anticipated barriers to physical therapy: Work schedule     Goals:   Short Term Goals:  6 weeks (progressing, not met)  1.Report decreased ankle pain  < / =  2/10  to increase tolerance for ambulating in tennis shoes  2. Increase ROM to 5 degrees DF in order to walk with min to no compensation.  3. Increase strength by 1/3 MMT grade for ankle intrinsic  to increase tolerance for ADL and work activities.  4. Pt to tolerate HEP to improve ROM and independence with ADL's     Long Term Goals: 18 weeks (progressing, not met)   1.Report decreased ankle pain  < / =  0/10  to increase tolerance for return to work 12 hour shift pain free  2.Patient goal: return to working without ankle pain   3.Increase strength to 4+/5 for  Ankle PF/DF  to increase tolerance for ADL and work activities.  4. Pt will report at CJ level (20-40% impaired) on Modified FIM score for mobility to demonstrate increase in LE function and mobility in home and community environment.     PLAN   Plan of care Certification: 4/12/2022 to 10/12/2022.    Continue with current plan of care with emphasis on ankle mobility, proximal strengthening, and gait mechanics.     Pt co-treated with Rick Manjarrez PT, DPT, OCS     Rick Manjarrez PT

## 2022-06-06 ENCOUNTER — CLINICAL SUPPORT (OUTPATIENT)
Dept: REHABILITATION | Facility: HOSPITAL | Age: 32
End: 2022-06-06
Payer: COMMERCIAL

## 2022-06-06 DIAGNOSIS — M25.572 ACUTE LEFT ANKLE PAIN: Primary | ICD-10-CM

## 2022-06-06 PROCEDURE — 97140 MANUAL THERAPY 1/> REGIONS: CPT | Performed by: PHYSICAL THERAPIST

## 2022-06-06 PROCEDURE — 97110 THERAPEUTIC EXERCISES: CPT | Performed by: PHYSICAL THERAPIST

## 2022-06-06 NOTE — PROGRESS NOTES
[facility]   Physical Therapy Treatment Note     Name: Roxanne Hardy  Clinic Number: [id number]    Therapy Diagnosis:   Encounter Diagnosis   Name Primary?    Acute left ankle pain Yes     Physician: Kaveh Bobo DO    Visit Date: 6/6/2022  Physician Orders: PT Eval and Treat   Medical Diagnosis from Referral: Z98.890 (ICD-10-CM) - Post-operative state  Evaluation Date: 4/12/2022  Authorization Period Expiration: 12/31/2022  Plan of Care Expiration: 10/12/2022  Visit # / Visits authorized: 11/20    PTA Visit #: 0/5     FOTO first follow up:   FOTO second follow up:     Time In: 1000am   Time Out: 1115am  Total Billable Time: 75 minutes    Precautions: Weightbearing      Date of Surgery: 3/3/2022     Plan:  Nonweightbearing for 6 weeks will leave her in a splint until she is seen back in clinic and then switch her to a cast for an additional 4 weeks and then 2 weeks of partial weight-bearing in a Cam boot using 1-2 crutches. At a total of 8 weeks postop we can our to fully weightbear as tolerated in a Cam boot for 2 weeks. At 10 weeks postop she can weight bear as tolerated in a regular shoe if healing is appropriate.  We will see her back in clinic in 2 weeks see plan above.     4/28: we can our to fully weightbear as tolerated in a Cam boot for 2 weeks.   5/12: she can weight bear as tolerated in a regular shoe if healing is appropriate.    SUBJECTIVE     Pt reports: Return to work tomorrow. Notes some pinching over the front of the ankle (pointing a talocrural)     She was compliant with home exercise program.  Response to previous treatment: Improved gait tolerance  Functional change: Ambulating no assistive device    Pain: 0/10  Location: left ankle       OBJECTIVE     Objective Measures updated at progress report unless specified.     Observation 6/6/22: Ambulating in tennis shoes no assistive device    Ankle Range of Motion:  - Dorsiflexion: 7 degrees (post manual)   -  "Plantarflexion: 50 degrees (post manual)  - Inversion: 18 degrees  - Eversion: 8 degrees    Treatment     Roxanne received the treatments listed below:      therapeutic exercises to develop strength, endurance, ROM and flexibility for 53 minutes including:    Pt education on continuation with HEP, gait mechanics, and weight bearing status   Step up 4" red sport cord 20x  Mini squat to bench 3 x 10  Knee to wall self mob lunge position 10x  Wedge gastroc/soleus stretch 30 sec 3x   Prone gastroc/soleus BTB 2 x 20 B    NT  AlterG ambulatiion 65% BW  1.5 speed with gait feedback on device - 10'  Step up 3" 3 x 15  Standing HS curls 4# 2 x 10  Bridges feet on swiss ball 2 x 10  TKE sport cord cues reduce hip IR 2 x 20  Assisted heel raise purple sport cord 3x to fatigue  Gait training - WBAT with bilaterl axillary crutches 3 laps turf   DF stretch seated in chair 10 sec 20x  GSS stretch strep 30 sec 5x  Sidelying hip abduction 2 x 10  Sidelying hip ER 2 x 15  Shuttle 2 bands 2 x 20    Manual therapy techniques: Joint mobilizations, Soft tissue Mobilization and scar mobs were applied to the: Left ankle for 22 minutes, including:    Ankle ROM Assessment  TC manipulation; grade 5 - pt agreeable   Talocrural anterior to posterior mobilizations in open packed position grade IV  Subtalar mobilizations grade IV  Great toe mobilizations grade III  Metatarsal splaying   Scar mobilizations   Mulligans ankle dorsiflexion and posterior/ER/superior glide   Sidelying dorsiflexion with proximal fibular PA mob -NT    Neuromuscular re-education activities to improve: Balance, Coordination, Kinesthetic and Sense for 0 minutes. The following activities were included:    Prone gastroc/soleus resisted PF 8 sec 5x each  Lateral walk on toes with YTB GSS/ 5 laps with balance beam under heels  SL cone taps 3 x 15  SL balance airex 10 sec 10x  Rockerboard 4 way 1' each     NT  Hurdles - Lateral and Forward; 8 minutes   SL Clamshells x15; 10 sec " holds LLE only  Mini Squats 0-40 with YTB around knees 3 x 10; R foot forward  Fitter board 1' 4 directions   Seated heel - toe raises 15# KB on knee 2 x 15  Weight shifts on airex 30x  Gait training heel strike/toe off over hurdles with cues for IR 5 laps  Bridges supine 30x  Wall squats endurance holds 30x  Ambulation single crutch- cues glut med and avoid hip IR  YTB ankle PF/inv/ev 3'  Shuttle 1 1/2 bands DL 3'  Shuttle 1/2 band SL on L 3'  Heel raises 1/2 band DL 3'   Fitter board 4 direction 2' each  Bridges 2 x 15  Isometrics at wall 10 sec 10x  Towel crunches 3 min   YTB Great toe flexion 3'  Arch lifts 2x10 with 3 sec holds   Weight shifts on scale with CAM boot working up to 75-80 lbs with 3 sec holds     therapeutic activities to improve functional performance for 0 minutes, including:    gait training to improve functional mobility and safety for 0 minutes, including:    Using bilateral crutches and utilizing CAM boot:  -Step-to gait pattern 4x15 feet   -Step-through gait pattern 6x30 feet       Patient Education and Home Exercises     Home Exercises Provided and Patient Education Provided     Education provided:   - Continue with HEP  - CAM boot with step through gait pattern     Written Home Exercises Provided: Patient instructed to cont prior HEP. Exercises were reviewed and Roxanne was able to demonstrate them prior to the end of the session.  Roxanne demonstrated good  understanding of the education provided. See EMR under Patient Instructions for exercises provided during therapy sessions    ASSESSMENT     Roxanne progressing with closed chain DF, needs to improve self mob in closed chain at wall and stretch to gastroc/soleus. Given BTB for gastroc soleus strengthening and ordered heel wedge to improve her gait cycle and cues for toe off    Roxanne Is progressing well towards her goals.   Pt prognosis is Good.     Pt will continue to benefit from skilled outpatient physical therapy to address the deficits  listed in the problem list box on initial evaluation, provide pt/family education and to maximize pt's level of independence in the home and community environment.     Pt's spiritual, cultural and educational needs considered and pt agreeable to plan of care and goals.     Anticipated barriers to physical therapy: Work schedule     Goals:   Short Term Goals:  6 weeks (progressing, not met)  1.Report decreased ankle pain  < / =  2/10  to increase tolerance for ambulating in tennis shoes  2. Increase ROM to 5 degrees DF in order to walk with min to no compensation.  3. Increase strength by 1/3 MMT grade for ankle intrinsic  to increase tolerance for ADL and work activities.  4. Pt to tolerate HEP to improve ROM and independence with ADL's     Long Term Goals: 18 weeks (progressing, not met)   1.Report decreased ankle pain  < / =  0/10  to increase tolerance for return to work 12 hour shift pain free  2.Patient goal: return to working without ankle pain   3.Increase strength to 4+/5 for  Ankle PF/DF  to increase tolerance for ADL and work activities.  4. Pt will report at CJ level (20-40% impaired) on Modified FIM score for mobility to demonstrate increase in LE function and mobility in home and community environment.     PLAN   Plan of care Certification: 4/12/2022 to 10/12/2022.    Continue with current plan of care with emphasis on ankle mobility, proximal strengthening, and gait mechanics.     Pt co-treated with Rick Manjarrez PT, DPT, OCS     Rick Manjarrez PT

## 2022-06-10 ENCOUNTER — CLINICAL SUPPORT (OUTPATIENT)
Dept: REHABILITATION | Facility: HOSPITAL | Age: 32
End: 2022-06-10
Payer: COMMERCIAL

## 2022-06-10 DIAGNOSIS — M25.572 ACUTE LEFT ANKLE PAIN: Primary | ICD-10-CM

## 2022-06-10 PROCEDURE — 97110 THERAPEUTIC EXERCISES: CPT | Performed by: PHYSICAL THERAPIST

## 2022-06-10 PROCEDURE — 97140 MANUAL THERAPY 1/> REGIONS: CPT | Performed by: PHYSICAL THERAPIST

## 2022-06-10 NOTE — PROGRESS NOTES
[facility]   Physical Therapy Treatment Note     Name: Roxanne Hardy  Clinic Number: [id number]    Therapy Diagnosis:   Encounter Diagnosis   Name Primary?    Acute left ankle pain Yes     Physician: Kaveh Bobo DO    Visit Date: 6/10/2022  Physician Orders: PT Eval and Treat   Medical Diagnosis from Referral: Z98.890 (ICD-10-CM) - Post-operative state  Evaluation Date: 4/12/2022  Authorization Period Expiration: 12/31/2022  Plan of Care Expiration: 10/12/2022  Visit # / Visits authorized: 12/20    PTA Visit #: 0/5     FOTO first follow up:   FOTO second follow up:     Time In: 900am   Time Out: 1005am  Total Billable Time: 65 minutes    Precautions: Weightbearing      Date of Surgery: 3/3/2022     Plan:  Nonweightbearing for 6 weeks will leave her in a splint until she is seen back in clinic and then switch her to a cast for an additional 4 weeks and then 2 weeks of partial weight-bearing in a Cam boot using 1-2 crutches. At a total of 8 weeks postop we can our to fully weightbear as tolerated in a Cam boot for 2 weeks. At 10 weeks postop she can weight bear as tolerated in a regular shoe if healing is appropriate.  We will see her back in clinic in 2 weeks see plan above.     4/28: we can our to fully weightbear as tolerated in a Cam boot for 2 weeks.   5/12: she can weight bear as tolerated in a regular shoe if healing is appropriate.    SUBJECTIVE     Pt reports: Got the heel wedges and focusing on my walking      She was compliant with home exercise program.  Response to previous treatment: Improved gait tolerance  Functional change: Ambulating no assistive device    Pain: 0/10  Location: left ankle       OBJECTIVE     Objective Measures updated at progress report unless specified.     Observation 6/6/22: Ambulating in tennis shoes no assistive device    Ankle Range of Motion:  - Dorsiflexion: 7 degrees (post manual)   - Plantarflexion: 50 degrees (post manual)  - Inversion: 18  "degrees  - Eversion: 8 degrees    Treatment     Roxanne received the treatments listed below:      therapeutic exercises to develop strength, endurance, ROM and flexibility for 46 minutes including:    Pt education on continuation with HEP, gait mechanics, and weight bearing status   AlterG ambulatiion 80% BW  1.5 speed with gait feedback on device - 10'  AlterG GSS heel raises 20x 80%  AlterG soleus heel raises 20 70%  Leg Press DL 60# 3 x 15 increasing DF each set  Walking heel raises holding 5# in hands 2 laps  SL bridges 20x     NT  Step up 4" red sport cord 20x  Mini squat to bench 3 x 10  Knee to wall self mob lunge position 10x  Wedge gastroc/soleus stretch 30 sec 3x   Prone gastroc/soleus BTB 2 x 20 B  Step up 3" 3 x 15  Standing HS curls 4# 2 x 10    Manual therapy techniques: Joint mobilizations, Soft tissue Mobilization and scar mobs were applied to the: Left ankle for 14 minutes, including:    Ankle ROM Assessment  TC manipulation; grade 5 - pt agreeable   Talocrural anterior to posterior mobilizations in open packed position grade IV  Subtalar mobilizations grade IV  Great toe mobilizations grade III  Metatarsal splaying   Scar mobilizations   Mulligans ankle dorsiflexion and posterior/ER/superior glide   Sidelying dorsiflexion with proximal fibular PA mob -NT    Neuromuscular re-education activities to improve: Balance, Coordination, Kinesthetic and Sense for 0 minutes. The following activities were included:    Prone gastroc/soleus resisted PF 8 sec 5x each  Lateral walk on toes with YTB GSS/ 5 laps with balance beam under heels  SL cone taps 3 x 15  SL balance airex 10 sec 10x  Rockerboard 4 way 1' each     NT  Hurdles - Lateral and Forward; 8 minutes   SL Clamshells x15; 10 sec holds LLE only  Mini Squats 0-40 with YTB around knees 3 x 10; R foot forward  Fitter board 1' 4 directions   Seated heel - toe raises 15# KB on knee 2 x 15  Weight shifts on airex 30x  Gait training heel strike/toe off over " hurdles with cues for IR 5 laps  Bridges supine 30x  Wall squats endurance holds 30x  Ambulation single crutch- cues glut med and avoid hip IR      therapeutic activities to improve functional performance for 0 minutes, including:    gait training to improve functional mobility and safety for 0 minutes, including:    Using bilateral crutches and utilizing CAM boot:  -Step-to gait pattern 4x15 feet   -Step-through gait pattern 6x30 feet       Patient Education and Home Exercises     Home Exercises Provided and Patient Education Provided     Education provided:   - Continue with HEP  - CAM boot with step through gait pattern     Written Home Exercises Provided: Patient instructed to cont prior HEP. Exercises were reviewed and Roxanne was able to demonstrate them prior to the end of the session.  Roxanne demonstrated good  understanding of the education provided. See EMR under Patient Instructions for exercises provided during therapy sessions    ASSESSMENT     Roxanne tolerated gastroc soleus strengthening. Improved tolerance to gastroc soleus in the alterG and exercise tolerance. Better height and ability to walking with heel wedges today. Will continue strengthening as tolerated    Roxanne Is progressing well towards her goals.   Pt prognosis is Good.     Pt will continue to benefit from skilled outpatient physical therapy to address the deficits listed in the problem list box on initial evaluation, provide pt/family education and to maximize pt's level of independence in the home and community environment.     Pt's spiritual, cultural and educational needs considered and pt agreeable to plan of care and goals.     Anticipated barriers to physical therapy: Work schedule     Goals:   Short Term Goals:  6 weeks (progressing, not met)  1.Report decreased ankle pain  < / =  2/10  to increase tolerance for ambulating in tennis shoes  2. Increase ROM to 5 degrees DF in order to walk with min to no compensation.  3. Increase strength  by 1/3 MMT grade for ankle intrinsic  to increase tolerance for ADL and work activities.  4. Pt to tolerate HEP to improve ROM and independence with ADL's     Long Term Goals: 18 weeks (progressing, not met)   1.Report decreased ankle pain  < / =  0/10  to increase tolerance for return to work 12 hour shift pain free  2.Patient goal: return to working without ankle pain   3.Increase strength to 4+/5 for  Ankle PF/DF  to increase tolerance for ADL and work activities.  4. Pt will report at CJ level (20-40% impaired) on Modified FIM score for mobility to demonstrate increase in LE function and mobility in home and community environment.     PLAN   Plan of care Certification: 4/12/2022 to 10/12/2022.    Continue with current plan of care with emphasis on ankle mobility, proximal strengthening, and gait mechanics.     Pt co-treated with Rick Manjarrez PT, DPT, OCS     Rick Manjarrez PT

## 2022-06-15 ENCOUNTER — OFFICE VISIT (OUTPATIENT)
Dept: INTERNAL MEDICINE | Facility: CLINIC | Age: 32
End: 2022-06-15
Payer: COMMERCIAL

## 2022-06-15 ENCOUNTER — CLINICAL SUPPORT (OUTPATIENT)
Dept: REHABILITATION | Facility: HOSPITAL | Age: 32
End: 2022-06-15
Payer: COMMERCIAL

## 2022-06-15 VITALS
OXYGEN SATURATION: 98 % | DIASTOLIC BLOOD PRESSURE: 80 MMHG | HEART RATE: 103 BPM | SYSTOLIC BLOOD PRESSURE: 110 MMHG | HEIGHT: 66 IN | WEIGHT: 185.88 LBS | BODY MASS INDEX: 29.87 KG/M2

## 2022-06-15 DIAGNOSIS — Z00.00 ANNUAL PHYSICAL EXAM: Primary | ICD-10-CM

## 2022-06-15 DIAGNOSIS — S82.892S OPEN LEFT ANKLE FRACTURE, SEQUELA: ICD-10-CM

## 2022-06-15 DIAGNOSIS — Z91.030 BEE STING ALLERGY: ICD-10-CM

## 2022-06-15 DIAGNOSIS — M25.572 ACUTE LEFT ANKLE PAIN: Primary | ICD-10-CM

## 2022-06-15 PROBLEM — S62.021A: Status: RESOLVED | Noted: 2021-03-22 | Resolved: 2022-06-15

## 2022-06-15 PROBLEM — M25.531 RIGHT WRIST PAIN: Status: RESOLVED | Noted: 2021-05-11 | Resolved: 2022-06-15

## 2022-06-15 PROBLEM — N62 MACROMASTIA: Status: RESOLVED | Noted: 2018-10-31 | Resolved: 2022-06-15

## 2022-06-15 PROBLEM — M25.631 DECREASED RANGE OF MOTION OF RIGHT WRIST: Status: RESOLVED | Noted: 2021-05-11 | Resolved: 2022-06-15

## 2022-06-15 PROCEDURE — 3008F PR BODY MASS INDEX (BMI) DOCUMENTED: ICD-10-PCS | Mod: CPTII,S$GLB,, | Performed by: STUDENT IN AN ORGANIZED HEALTH CARE EDUCATION/TRAINING PROGRAM

## 2022-06-15 PROCEDURE — 1159F MED LIST DOCD IN RCRD: CPT | Mod: CPTII,S$GLB,, | Performed by: STUDENT IN AN ORGANIZED HEALTH CARE EDUCATION/TRAINING PROGRAM

## 2022-06-15 PROCEDURE — 99385 PR PREVENTIVE VISIT,NEW,18-39: ICD-10-PCS | Mod: S$GLB,,, | Performed by: STUDENT IN AN ORGANIZED HEALTH CARE EDUCATION/TRAINING PROGRAM

## 2022-06-15 PROCEDURE — 99999 PR PBB SHADOW E&M-EST. PATIENT-LVL III: ICD-10-PCS | Mod: PBBFAC,,, | Performed by: STUDENT IN AN ORGANIZED HEALTH CARE EDUCATION/TRAINING PROGRAM

## 2022-06-15 PROCEDURE — 99385 PREV VISIT NEW AGE 18-39: CPT | Mod: S$GLB,,, | Performed by: STUDENT IN AN ORGANIZED HEALTH CARE EDUCATION/TRAINING PROGRAM

## 2022-06-15 PROCEDURE — 3079F PR MOST RECENT DIASTOLIC BLOOD PRESSURE 80-89 MM HG: ICD-10-PCS | Mod: CPTII,S$GLB,, | Performed by: STUDENT IN AN ORGANIZED HEALTH CARE EDUCATION/TRAINING PROGRAM

## 2022-06-15 PROCEDURE — 1159F PR MEDICATION LIST DOCUMENTED IN MEDICAL RECORD: ICD-10-PCS | Mod: CPTII,S$GLB,, | Performed by: STUDENT IN AN ORGANIZED HEALTH CARE EDUCATION/TRAINING PROGRAM

## 2022-06-15 PROCEDURE — 97140 MANUAL THERAPY 1/> REGIONS: CPT | Performed by: PHYSICAL THERAPIST

## 2022-06-15 PROCEDURE — 3074F PR MOST RECENT SYSTOLIC BLOOD PRESSURE < 130 MM HG: ICD-10-PCS | Mod: CPTII,S$GLB,, | Performed by: STUDENT IN AN ORGANIZED HEALTH CARE EDUCATION/TRAINING PROGRAM

## 2022-06-15 PROCEDURE — 3074F SYST BP LT 130 MM HG: CPT | Mod: CPTII,S$GLB,, | Performed by: STUDENT IN AN ORGANIZED HEALTH CARE EDUCATION/TRAINING PROGRAM

## 2022-06-15 PROCEDURE — 97110 THERAPEUTIC EXERCISES: CPT | Performed by: PHYSICAL THERAPIST

## 2022-06-15 PROCEDURE — 3008F BODY MASS INDEX DOCD: CPT | Mod: CPTII,S$GLB,, | Performed by: STUDENT IN AN ORGANIZED HEALTH CARE EDUCATION/TRAINING PROGRAM

## 2022-06-15 PROCEDURE — 3079F DIAST BP 80-89 MM HG: CPT | Mod: CPTII,S$GLB,, | Performed by: STUDENT IN AN ORGANIZED HEALTH CARE EDUCATION/TRAINING PROGRAM

## 2022-06-15 PROCEDURE — 99999 PR PBB SHADOW E&M-EST. PATIENT-LVL III: CPT | Mod: PBBFAC,,, | Performed by: STUDENT IN AN ORGANIZED HEALTH CARE EDUCATION/TRAINING PROGRAM

## 2022-06-15 RX ORDER — ACETAMINOPHEN 500 MG
5000 TABLET ORAL DAILY
COMMUNITY
End: 2023-11-30

## 2022-06-15 RX ORDER — EPINEPHRINE 0.3 MG/.3ML
1 INJECTION SUBCUTANEOUS ONCE AS NEEDED
Qty: 2 EACH | Refills: 3 | Status: SHIPPED | OUTPATIENT
Start: 2022-06-15 | End: 2023-06-27 | Stop reason: SDUPTHER
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
## 2022-06-15 NOTE — PROGRESS NOTES
[facility]   Physical Therapy Treatment Note     Name: Roxanne Hardy  Clinic Number: [id number]    Therapy Diagnosis:   Encounter Diagnosis   Name Primary?    Acute left ankle pain Yes     Physician: Kaveh Bobo DO    Visit Date: 6/15/2022  Physician Orders: PT Eval and Treat   Medical Diagnosis from Referral: Z98.890 (ICD-10-CM) - Post-operative state  Evaluation Date: 4/12/2022  Authorization Period Expiration: 12/31/2022  Plan of Care Expiration: 10/12/2022  Visit # / Visits authorized: 13/20    PTA Visit #: 0/5     FOTO first follow up:   FOTO second follow up:     Time In: 1100am   Time Out: 1200pm  Total Billable Time: 60 minutes    Precautions: Weightbearing      Date of Surgery: 3/3/2022     Plan:  Nonweightbearing for 6 weeks will leave her in a splint until she is seen back in clinic and then switch her to a cast for an additional 4 weeks and then 2 weeks of partial weight-bearing in a Cam boot using 1-2 crutches. At a total of 8 weeks postop we can our to fully weightbear as tolerated in a Cam boot for 2 weeks. At 10 weeks postop she can weight bear as tolerated in a regular shoe if healing is appropriate.  We will see her back in clinic in 2 weeks see plan above.     4/28: we can our to fully weightbear as tolerated in a Cam boot for 2 weeks.   5/12: she can weight bear as tolerated in a regular shoe if healing is appropriate.    SUBJECTIVE     Pt reports: Walking better, spent 2 days 12 hours on feet with improved tolerance      She was compliant with home exercise program.  Response to previous treatment: Improved gait tolerance  Functional change: Ambulating no assistive device    Pain: 0/10  Location: left ankle       OBJECTIVE     Objective Measures updated at progress report unless specified.     Observation 6/6/22: Ambulating in tennis shoes no assistive device    Ankle Range of Motion:  - Dorsiflexion: 7 degrees (post manual)   - Plantarflexion: 50 degrees (post  "manual)  - Inversion: 18 degrees  - Eversion: 8 degrees    Treatment     Roxanne received the treatments listed below:      therapeutic exercises to develop strength, endurance, ROM and flexibility for 50 minutes including:    Pt education on continuation with HEP, gait mechanics, and weight bearing status   Lunge behind table 2 x 15 B   Step up 6" opp high knee 2 x 15  Squats on wobble board 30x   LAQ 10# 20x  -eccentric lowering 15# 20x  Prone resisted PF gastroc/soleus 5x to fatigue  Prone roller to gastroc/soleus 30 sec 3x     NT  AlterG ambulatiion 80% BW  1.5 speed with gait feedback on device - 10'  AlterG GSS heel raises 20x 80%  AlterG soleus heel raises 20 70%  Leg Press DL 60# 3 x 15 increasing DF each set  Walking heel raises holding 5# in hands 2 laps  SL bridges 20x   Step up 4" red sport cord 20x      Manual therapy techniques: Joint mobilizations, Soft tissue Mobilization and scar mobs were applied to the: Left ankle for 10 minutes, including:    Ankle ROM Assessment  TC manipulation; grade 5 - pt agreeable   Talocrural anterior to posterior mobilizations in open packed position grade IV  Subtalar mobilizations grade IV  Great toe mobilizations grade III  Metatarsal splaying   Scar mobilizations   Mulligans ankle dorsiflexion and posterior/ER/superior glide   Sidelying dorsiflexion with proximal fibular PA mob -NT    Neuromuscular re-education activities to improve: Balance, Coordination, Kinesthetic and Sense for 0 minutes. The following activities were included:    Prone gastroc/soleus resisted PF 8 sec 5x each  Lateral walk on toes with YTB GSS/ 5 laps with balance beam under heels  SL cone taps 3 x 15  SL balance airex 10 sec 10x  Rockerboard 4 way 1' each     NT  Hurdles - Lateral and Forward; 8 minutes   SL Clamshells x15; 10 sec holds LLE only  Mini Squats 0-40 with YTB around knees 3 x 10; R foot forward        therapeutic activities to improve functional performance for 0 minutes, " including:    gait training to improve functional mobility and safety for 0 minutes, including:    Using bilateral crutches and utilizing CAM boot:  -Step-to gait pattern 4x15 feet   -Step-through gait pattern 6x30 feet       Patient Education and Home Exercises     Home Exercises Provided and Patient Education Provided     Education provided:   - Continue with HEP  - CAM boot with step through gait pattern     Written Home Exercises Provided: Patient instructed to cont prior HEP. Exercises were reviewed and Roxanne was able to demonstrate them prior to the end of the session.  Roxanne demonstrated good  understanding of the education provided. See EMR under Patient Instructions for exercises provided during therapy sessions    ASSESSMENT     Roxanne progressed to weightbearing step ups, lunge for improved DF and improving upon ankle mobility. Eccentrics for quad strengthening, trouble with lunge with LE lowering. Patient cue with ambulation to shorten step length     Roxanne Is progressing well towards her goals.   Pt prognosis is Good.     Pt will continue to benefit from skilled outpatient physical therapy to address the deficits listed in the problem list box on initial evaluation, provide pt/family education and to maximize pt's level of independence in the home and community environment.     Pt's spiritual, cultural and educational needs considered and pt agreeable to plan of care and goals.     Anticipated barriers to physical therapy: Work schedule     Goals:   Short Term Goals:  6 weeks (progressing, not met)  1.Report decreased ankle pain  < / =  2/10  to increase tolerance for ambulating in tennis shoes  2. Increase ROM to 5 degrees DF in order to walk with min to no compensation.  3. Increase strength by 1/3 MMT grade for ankle intrinsic  to increase tolerance for ADL and work activities.  4. Pt to tolerate HEP to improve ROM and independence with ADL's     Long Term Goals: 18 weeks (progressing, not met)   1.Report  decreased ankle pain  < / =  0/10  to increase tolerance for return to work 12 hour shift pain free  2.Patient goal: return to working without ankle pain   3.Increase strength to 4+/5 for  Ankle PF/DF  to increase tolerance for ADL and work activities.  4. Pt will report at CJ level (20-40% impaired) on Modified FIM score for mobility to demonstrate increase in LE function and mobility in home and community environment.     PLAN   Plan of care Certification: 4/12/2022 to 10/12/2022.    Continue with current plan of care with emphasis on ankle mobility, proximal strengthening, and gait mechanics.       Rick Manjarrez, PT

## 2022-06-22 ENCOUNTER — CLINICAL SUPPORT (OUTPATIENT)
Dept: REHABILITATION | Facility: HOSPITAL | Age: 32
End: 2022-06-22
Payer: COMMERCIAL

## 2022-06-22 DIAGNOSIS — M25.572 ACUTE LEFT ANKLE PAIN: Primary | ICD-10-CM

## 2022-06-22 PROCEDURE — 97110 THERAPEUTIC EXERCISES: CPT | Performed by: PHYSICAL THERAPIST

## 2022-06-22 PROCEDURE — 97140 MANUAL THERAPY 1/> REGIONS: CPT | Performed by: PHYSICAL THERAPIST

## 2022-06-23 NOTE — PROGRESS NOTES
"[facility]   Physical Therapy Treatment Note     Name: Roxanne Hardy  Clinic Number: [id number]    Therapy Diagnosis:   Encounter Diagnosis   Name Primary?    Acute left ankle pain Yes     Physician: Kaveh Bobo DO    Visit Date: 6/22/2022  Physician Orders: PT Eval and Treat   Medical Diagnosis from Referral: Z98.890 (ICD-10-CM) - Post-operative state  Evaluation Date: 4/12/2022  Authorization Period Expiration: 12/31/2022  Plan of Care Expiration: 10/12/2022  Visit # / Visits authorized: 14/20    PTA Visit #: 0/5     FOTO first follow up:   FOTO second follow up:     Time In: 1000am   Time Out: 1100am  Total Billable Time: 60 minutes    Precautions: Weightbearing      Date of Surgery: 3/3/2022      SUBJECTIVE     Pt reports: Ankle felt great after last session. Walking better at work and home     She was compliant with home exercise program.  Response to previous treatment: Improved gait tolerance  Functional change: Ambulating no assistive device    Pain: 0/10  Location: left ankle       OBJECTIVE     Objective Measures updated at progress report unless specified.     Observation 6/6/22: Ambulating in tennis shoes no assistive device    Ankle Range of Motion:  - Dorsiflexion: 7 degrees (post manual)   - Plantarflexion: 50 degrees (post manual)  - Inversion: 18 degrees  - Eversion: 8 degrees    Treatment     Roxanne received the treatments listed below:      therapeutic exercises to develop strength, endurance, ROM and flexibility for 50 minutes including:    Pt education on continuation with HEP, gait mechanics, and weight bearing status   AlterG ambulatiion 80% BW  1.5 speed with gait feedback on device - 10'  AlterG GSS heel raises 20x 75%  AlterG soleus heel raises 20 75%  Self DF stretch 10x 10sec  Sneaky lunge level 1 20x  TKE MTB 30x    NT  Lunge behind table 2 x 15 B   Step up 6" opp high knee 2 x 15  Squats on wobble board 30x   LAQ 10# 20x  -eccentric lowering 15# 20x  Prone " "resisted PF gastroc/soleus 5x to fatigue  Prone roller to gastroc/soleus 30 sec 3x   Leg Press DL 60# 3 x 15 increasing DF each set  Walking heel raises holding 5# in hands 2 laps  SL bridges 20x   Step up 4" red sport cord 20x      Manual therapy techniques: Joint mobilizations, Soft tissue Mobilization and scar mobs were applied to the: Left ankle for 10 minutes, including:    Ankle ROM Assessment  Talocrural anterior to posterior mobilizations in open packed position grade IV  Subtalar mobilizations grade IV  Great toe mobilizations grade III  Metatarsal splaying   Scar mobilizations   Mulligans ankle dorsiflexion and posterior/ER/superior glide   Sidelying dorsiflexion with proximal fibular PA mob -NT    Neuromuscular re-education activities to improve: Balance, Coordination, Kinesthetic and Sense for 0 minutes. The following activities were included:    Prone gastroc/soleus resisted PF 8 sec 5x each  Lateral walk on toes with YTB GSS/ 5 laps with balance beam under heels  SL cone taps 3 x 15  SL balance airex 10 sec 10x  Rockerboard 4 way 1' each     NT  Hurdles - Lateral and Forward; 8 minutes   SL Clamshells x15; 10 sec holds LLE only  Mini Squats 0-40 with YTB around knees 3 x 10; R foot forward        therapeutic activities to improve functional performance for 0 minutes, including:    gait training to improve functional mobility and safety for 0 minutes, including:    Using bilateral crutches and utilizing CAM boot:  -Step-to gait pattern 4x15 feet   -Step-through gait pattern 6x30 feet       Patient Education and Home Exercises     Home Exercises Provided and Patient Education Provided     Education provided:   - Continue with HEP  - CAM boot with step through gait pattern     Written Home Exercises Provided: Patient instructed to cont prior HEP. Exercises were reviewed and Roxanne was able to demonstrate them prior to the end of the session.  Roxanne demonstrated good  understanding of the education provided. " See EMR under Patient Instructions for exercises provided during therapy sessions    ASSESSMENT     Roxanne had much improved PROM to the ankle today, improved closed chain DF as well. Getting stronger in weightbearing, progressed to sneaky lunge. She fatigued on alterG and educated on need to improve quad/glut strength for further gait changes    Roxanne Is progressing well towards her goals.   Pt prognosis is Good.     Pt will continue to benefit from skilled outpatient physical therapy to address the deficits listed in the problem list box on initial evaluation, provide pt/family education and to maximize pt's level of independence in the home and community environment.     Pt's spiritual, cultural and educational needs considered and pt agreeable to plan of care and goals.     Anticipated barriers to physical therapy: Work schedule     Goals:   Short Term Goals:  6 weeks (progressing, not met)  1.Report decreased ankle pain  < / =  2/10  to increase tolerance for ambulating in tennis shoes  2. Increase ROM to 5 degrees DF in order to walk with min to no compensation.  3. Increase strength by 1/3 MMT grade for ankle intrinsic  to increase tolerance for ADL and work activities.  4. Pt to tolerate HEP to improve ROM and independence with ADL's     Long Term Goals: 18 weeks (progressing, not met)   1.Report decreased ankle pain  < / =  0/10  to increase tolerance for return to work 12 hour shift pain free  2.Patient goal: return to working without ankle pain   3.Increase strength to 4+/5 for  Ankle PF/DF  to increase tolerance for ADL and work activities.  4. Pt will report at CJ level (20-40% impaired) on Modified FIM score for mobility to demonstrate increase in LE function and mobility in home and community environment.     PLAN   Plan of care Certification: 4/12/2022 to 10/12/2022.    Continue with current plan of care with emphasis on ankle mobility, proximal strengthening, and gait mechanics.       Rick Manjarrez,  PT

## 2022-06-28 ENCOUNTER — CLINICAL SUPPORT (OUTPATIENT)
Dept: REHABILITATION | Facility: HOSPITAL | Age: 32
End: 2022-06-28
Payer: COMMERCIAL

## 2022-06-28 DIAGNOSIS — M25.572 ACUTE LEFT ANKLE PAIN: Primary | ICD-10-CM

## 2022-06-28 PROCEDURE — 97110 THERAPEUTIC EXERCISES: CPT | Performed by: PHYSICAL THERAPIST

## 2022-06-28 PROCEDURE — 97140 MANUAL THERAPY 1/> REGIONS: CPT | Performed by: PHYSICAL THERAPIST

## 2022-06-28 PROCEDURE — 97112 NEUROMUSCULAR REEDUCATION: CPT | Performed by: PHYSICAL THERAPIST

## 2022-06-28 NOTE — PROGRESS NOTES
"[facility]   Physical Therapy Treatment Note     Name: Roxanne Hardy  Clinic Number: [id number]    Therapy Diagnosis:   Encounter Diagnosis   Name Primary?    Acute left ankle pain Yes     Physician: Kaveh Bobo DO    Visit Date: 6/28/2022  Physician Orders: PT Eval and Treat   Medical Diagnosis from Referral: Z98.890 (ICD-10-CM) - Post-operative state  Evaluation Date: 4/12/2022  Authorization Period Expiration: 12/31/2022  Plan of Care Expiration: 10/12/2022  Visit # / Visits authorized: 15/20    PTA Visit #: 0/5     FOTO first follow up:   FOTO second follow up:     Time In: 0900am   Time Out: 1000am  Total Billable Time: 60 minutes    Precautions: Weightbearing      Date of Surgery: 3/3/2022      SUBJECTIVE     Pt reports: I am back in my tennis shoes. Walked 2 days of clinicals without bad pain in ankle     She was compliant with home exercise program.  Response to previous treatment: Improved gait tolerance  Functional change: Ambulating no assistive device    Pain: 0/10  Location: left ankle       OBJECTIVE     Objective Measures updated at progress report unless specified.     Observation 6/6/22: Ambulating in tennis shoes no assistive device    Ankle Range of Motion:  - Dorsiflexion: 7 degrees (post manual)   - Plantarflexion: 50 degrees (post manual)  - Inversion: 18 degrees  - Eversion: 8 degrees    Treatment     Roxanne received the treatments listed below:      therapeutic exercises to develop strength, endurance, ROM and flexibility for 20 minutes including:    Pt education on continuation with HEP, gait mechanics, and weight bearing status   Self DF stretch 10x 10sec  2" heel raise GSS 2 x 15    NT  AlterG ambulatiion 80% BW  1.5 speed with gait feedback on device - 10'  AlterG GSS heel raises 20x 75%  AlterG soleus heel raises 20 75%  Sneaky lunge level 1 20x  TKE MTB 30x  Lunge behind table 2 x 15 B   Step up 6" opp high knee 2 x 15  Squats on wobble board 30x   LAQ 10# " "20x  -eccentric lowering 15# 20x  Prone resisted PF gastroc/soleus 5x to fatigue  Prone roller to gastroc/soleus 30 sec 3x   Leg Press DL 60# 3 x 15 increasing DF each set  Walking heel raises holding 5# in hands 2 laps  SL bridges 20x   Step up 4" red sport cord 20x      Manual therapy techniques: Joint mobilizations, Soft tissue Mobilization and scar mobs were applied to the: Left ankle for 15 minutes, including:    Ankle ROM Assessment  Talocrural anterior to posterior mobilizations in open packed position grade IV  Subtalar mobilizations grade IV  Great toe mobilizations grade III  Metatarsal splaying   Scar mobilizations   Mulligans ankle dorsiflexion and posterior/ER/superior glide   Sidelying dorsiflexion with proximal fibular PA mob     Neuromuscular re-education activities to improve: Balance, Coordination, Kinesthetic and Sense for 25 minutes. The following activities were included:    Airex SL balance 5 sec 10x  SL balance 5# side to side  SL balance tendem on airex 2 x 10  Lateral walk on toes with YTB GSS/ 3 laps straight knee/bent knee alternating     NT  Prone gastroc/soleus resisted PF 8 sec 5x each    SL cone taps 3 x 15  SL balance airex 10 sec 10x  Rockerboard 4 way 1' each   Hurdles - Lateral and Forward; 8 minutes   SL Clamshells x15; 10 sec holds LLE only  Mini Squats 0-40 with YTB around knees 3 x 10; R foot forward        therapeutic activities to improve functional performance for 0 minutes, including:    gait training to improve functional mobility and safety for 0 minutes, including:    Using bilateral crutches and utilizing CAM boot:  -Step-to gait pattern 4x15 feet   -Step-through gait pattern 6x30 feet       Patient Education and Home Exercises     Home Exercises Provided and Patient Education Provided     Education provided:   - Continue with HEP  - CAM boot with step through gait pattern     Written Home Exercises Provided: Patient instructed to cont prior HEP. Exercises were reviewed " and Roxanne was able to demonstrate them prior to the end of the session.  Roxanne demonstrated good  understanding of the education provided. See EMR under Patient Instructions for exercises provided during therapy sessions    ASSESSMENT     Challenged patient's ankle strategy and limited proprioception on the airex today. Progressing well with ROM and wounds closing nicely. Began a more aggressive scar massage and subtalar mobilizations. Gastroc and soleus strength slowly improving as well.    Roxanne Is progressing well towards her goals.   Pt prognosis is Good.     Pt will continue to benefit from skilled outpatient physical therapy to address the deficits listed in the problem list box on initial evaluation, provide pt/family education and to maximize pt's level of independence in the home and community environment.     Pt's spiritual, cultural and educational needs considered and pt agreeable to plan of care and goals.     Anticipated barriers to physical therapy: Work schedule     Goals:   Short Term Goals:  6 weeks (progressing, not met)  1.Report decreased ankle pain  < / =  2/10  to increase tolerance for ambulating in tennis shoes  2. Increase ROM to 5 degrees DF in order to walk with min to no compensation.  3. Increase strength by 1/3 MMT grade for ankle intrinsic  to increase tolerance for ADL and work activities.  4. Pt to tolerate HEP to improve ROM and independence with ADL's     Long Term Goals: 18 weeks (progressing, not met)   1.Report decreased ankle pain  < / =  0/10  to increase tolerance for return to work 12 hour shift pain free  2.Patient goal: return to working without ankle pain   3.Increase strength to 4+/5 for  Ankle PF/DF  to increase tolerance for ADL and work activities.  4. Pt will report at CJ level (20-40% impaired) on Modified FIM score for mobility to demonstrate increase in LE function and mobility in home and community environment.     PLAN   Plan of care Certification: 4/12/2022 to  10/12/2022.    Continue with current plan of care with emphasis on ankle mobility, proximal strengthening, and gait mechanics.       Rick Manjarrez, PT

## 2022-07-01 ENCOUNTER — CLINICAL SUPPORT (OUTPATIENT)
Dept: REHABILITATION | Facility: HOSPITAL | Age: 32
End: 2022-07-01
Payer: COMMERCIAL

## 2022-07-01 DIAGNOSIS — M25.572 ACUTE LEFT ANKLE PAIN: Primary | ICD-10-CM

## 2022-07-01 PROCEDURE — 97112 NEUROMUSCULAR REEDUCATION: CPT | Performed by: PHYSICAL THERAPIST

## 2022-07-01 NOTE — PROGRESS NOTES
"[facility]   Physical Therapy Treatment Note     Name: Roxanne Hardy  Clinic Number: [id number]    Therapy Diagnosis:   Encounter Diagnosis   Name Primary?    Acute left ankle pain Yes     Physician: Kaveh Bobo DO    Visit Date: 7/1/2022  Physician Orders: PT Eval and Treat   Medical Diagnosis from Referral: Z98.890 (ICD-10-CM) - Post-operative state  Evaluation Date: 4/12/2022  Authorization Period Expiration: 12/31/2022  Plan of Care Expiration: 10/12/2022  Visit # / Visits authorized: 16/20    PTA Visit #: 0/5     FOTO first follow up:   FOTO second follow up:     Time In: 0947am   Time Out: 1009am  Total Billable Time: 15 minutes    Precautions: Weightbearing      Date of Surgery: 3/3/2022      SUBJECTIVE     Pt reports: My calf is still sore from exercises earlier this week      She was compliant with home exercise program.  Response to previous treatment: Improved gait tolerance  Functional change: Ambulating no assistive device    Pain: 0/10  Location: left ankle       OBJECTIVE     No objective measures taken this visit  Returned to wearing tennis shoes this week - decreased edema    Treatment     Roxanne received the treatments listed below:      therapeutic exercises to develop strength, endurance, ROM and flexibility for 48 minutes including:    Pt education on continuation with HEP, gait mechanics, and weight bearing status   Self DF stretch 10x 10sec  AlterG ambulatiion 80% BW  2.0 speed with gait feedback on device - 15'  AlterG GSS heel raises 20x 80%  AlterG soleus heel raises 20 80%  SL bridges 2 x 15  Step up 12" soft box 2 x 15 forward/lateral     NT    Sneaky lunge level 1 20x  TKE MTB 30x  Lunge behind table 2 x 15 B   Step up 6" opp high knee 2 x 15  Squats on [employer] board 30x   LAQ 10# 20x  -eccentric lowering 15# 20x  Prone resisted PF gastroc/soleus 5x to fatigue  Prone roller to gastroc/soleus 30 sec 3x   Leg Press DL 60# 3 x 15 increasing DF each set  Walking " "heel raises holding 5# in hands 2 laps  SL bridges 20x   Step up 4" red sport cord 20x      Manual therapy techniques: Joint mobilizations, Soft tissue Mobilization and scar mobs were applied to the: Left ankle for 14 minutes, including:    Ankle ROM Assessment  Talocrural anterior to posterior mobilizations in open packed position grade IV  Subtalar mobilizations grade IV  Great toe mobilizations grade III  Metatarsal splaying   Scar mobilizations   Mulligans ankle dorsiflexion and posterior/ER/superior glide   Sidelying dorsiflexion with proximal fibular PA mob     Neuromuscular re-education activities to improve: Balance, Coordination, Kinesthetic and Sense for 20 minutes. The following activities were included:    Airex SL balance 2# weight transfers 3 x 5  Isometric gastroc soleus stagger stance 2x to fatigue each   Turf ambulation with 10# KB cues for even hips/posture - equal arm swing, toe off, step length  Great toe DIP flexion 10x    NT  Prone gastroc/soleus resisted PF 8 sec 5x each  Airex SL balance 5 sec 10x  SL balance 5# side to side  SL balance tendem on airex 2 x 10  Lateral walk on toes with YTB GSS/ 3 laps straight knee/bent knee alternating   SL cone taps 3 x 15  SL balance airex 10 sec 10x  Rockerboard 4 way 1' each   Hurdles - Lateral and Forward; 8 minutes   SL Clamshells x15; 10 sec holds LLE only  Mini Squats 0-40 with YTB around knees 3 x 10; R foot forward        therapeutic activities to improve functional performance for 0 minutes, including:    gait training to improve functional mobility and safety for 0 minutes, including:    Using bilateral crutches and utilizing CAM boot:  -Step-to gait pattern 4x15 feet   -Step-through gait pattern 6x30 feet       Patient Education and Home Exercises     Home Exercises Provided and Patient Education Provided     Education provided:   - Continue with HEP  - CAM boot with step through gait pattern     Written Home Exercises Provided: Patient " instructed to cont prior HEP. Exercises were reviewed and Roxanne was able to demonstrate them prior to the end of the session.  Roxanne demonstrated good  understanding of the education provided. See EMR under Patient Instructions for exercises provided during therapy sessions    ASSESSMENT     Patient began in alterG with improved ambulation speed. Self DF mob improving, a little more limited into subtalar today. Limited DIP great toe flexion and poor motor control. Challenged proprioception with airex, SL bridge on soft table and step ups onto the soft box. Isometric for achilles tendinopathy symptoms. Patient was ambulating with sidebend, given KB to make her over exaggerate and fix the issue, then ambulated in mirror cues for quick walking and forget about thinking so much. Patient left clinic with excellent gait cycle, best since initial injury     Roxanne Is progressing well towards her goals.   Pt prognosis is Good.     Pt will continue to benefit from skilled outpatient physical therapy to address the deficits listed in the problem list box on initial evaluation, provide pt/family education and to maximize pt's level of independence in the home and community environment.     Pt's spiritual, cultural and educational needs considered and pt agreeable to plan of care and goals.     Anticipated barriers to physical therapy: Work schedule     Goals:   Short Term Goals:  6 weeks (progressing, not met)  1.Report decreased ankle pain  < / =  2/10  to increase tolerance for ambulating in tennis shoes  2. Increase ROM to 5 degrees DF in order to walk with min to no compensation.  3. Increase strength by 1/3 MMT grade for ankle intrinsic  to increase tolerance for ADL and work activities.  4. Pt to tolerate HEP to improve ROM and independence with ADL's     Long Term Goals: 18 weeks (progressing, not met)   1.Report decreased ankle pain  < / =  0/10  to increase tolerance for return to work 12 hour shift pain free  2.Patient  goal: return to working without ankle pain   3.Increase strength to 4+/5 for  Ankle PF/DF  to increase tolerance for ADL and work activities.  4. Pt will report at CJ level (20-40% impaired) on Modified FIM score for mobility to demonstrate increase in LE function and mobility in home and community environment.     PLAN   Plan of care Certification: 4/12/2022 to 10/12/2022.    Continue with current plan of care with emphasis on ankle mobility, proximal strengthening, and gait mechanics.       Rick Manjarrez, PT

## 2022-07-13 ENCOUNTER — CLINICAL SUPPORT (OUTPATIENT)
Dept: REHABILITATION | Facility: HOSPITAL | Age: 32
End: 2022-07-13
Payer: COMMERCIAL

## 2022-07-13 DIAGNOSIS — M25.572 ACUTE LEFT ANKLE PAIN: Primary | ICD-10-CM

## 2022-07-13 PROCEDURE — 97110 THERAPEUTIC EXERCISES: CPT

## 2022-07-13 PROCEDURE — 97112 NEUROMUSCULAR REEDUCATION: CPT

## 2022-07-13 PROCEDURE — 97140 MANUAL THERAPY 1/> REGIONS: CPT

## 2022-07-13 NOTE — PROGRESS NOTES
"[facility]   Physical Therapy Treatment Note     Name: Roxanne Hardy  Clinic Number: [id number]    Therapy Diagnosis:   Encounter Diagnosis   Name Primary?    Acute left ankle pain Yes     Physician: Kaveh Bobo DO    Visit Date: 7/13/2022  Physician Orders: PT Eval and Treat   Medical Diagnosis from Referral: Z98.890 (ICD-10-CM) - Post-operative state  Evaluation Date: 4/12/2022  Authorization Period Expiration: 12/31/2022  Plan of Care Expiration: 10/12/2022  Visit # / Visits authorized: 17/20    PTA Visit #: 0/5     FOTO first follow up:   FOTO second follow up:     Time In: 800am   Time Out: 900am  Total Billable Time: 60 minutes    Precautions: Weightbearing      Date of Surgery: 3/3/2022      SUBJECTIVE     Pt reports she walked too much in [place] so has a little soreness.      She was compliant with home exercise program.  Response to previous treatment: Improved gait tolerance  Functional change: Ambulating no assistive device    Pain: 0/10  Location: left ankle       OBJECTIVE   7/13/2022: Ankle AROM    DF: 5    PF: 45  Returned to wearing tennis shoes this week - decreased edema    Treatment     Roxanne received the treatments listed below:      therapeutic exercises to develop strength, endurance, ROM and flexibility for 30 minutes including:    Pt education on continuation with HEP, gait mechanics, and weight bearing status   Self DF stretch 10x 10sec  AlterG ambulatiion 80% BW  2.0 speed with gait feedback on device - 10'  AlterG GSS heel raises 20x 80%  AlterG soleus heel raises 20 80% - NT  Sit<>Stands 15# KB 2 x 10   SL bridges 2 x 15  Seated Soleus Heel raises 10# 2 x 10; 5sec holds   Step up 12" soft box 2 x 15 forward/lateral     NT    Sneaky lunge level 1 20x  TKE MTB 30x  Lunge behind table 2 x 15 B   Step up 6" opp high knee 2 x 15  Squats on wobble board 30x   LAQ 10# 20x  -eccentric lowering 15# 20x  Prone resisted PF gastroc/soleus 5x to fatigue  Prone roller " "to gastroc/soleus 30 sec 3x   Leg Press DL 60# 3 x 15 increasing DF each set  Walking heel raises holding 5# in hands 2 laps  SL bridges 20x   Step up 4" red sport cord 20x      Manual therapy techniques: Joint mobilizations, Soft tissue Mobilization and scar mobs were applied to the: Left ankle for 10 minutes, including:    Ankle ROM Assessment  Talocrural anterior to posterior mobilizations in open packed position grade IV  TC P/A in open packed, grade 4  Subtalar mobilizations grade IV  Great toe manipulation - pt agreeable     Not today:   Metatarsal splaying   Scar mobilizations   Mulligans ankle dorsiflexion and posterior/ER/superior glide   Sidelying dorsiflexion with proximal fibular PA mob     Neuromuscular re-education activities to improve: Balance, Coordination, Kinesthetic and Sense for 20 minutes. The following activities were included:    Airex SL balance 2# weight transfers 3 x 5  Turf ambulation with 10# KB cues for even hips/posture - equal arm swing, toe off, step length  Side Steps with YTB 4 x 15 feet   Tandem Walking for balance - 2 laps fo 15 ft      NT  Isometric gastroc soleus stagger stance 2x to fatigue each   Great toe DIP flexion 10x  Prone gastroc/soleus resisted PF 8 sec 5x each  Airex SL balance 5 sec 10x  SL balance 5# side to side  SL balance tendem on airex 2 x 10  Lateral walk on toes with YTB GSS/ 3 laps straight knee/bent knee alternating   SL cone taps 3 x 15  SL balance airex 10 sec 10x  Rockerboard 4 way 1' each   Hurdles - Lateral and Forward; 8 minutes   SL Clamshells x15; 10 sec holds LLE only  Mini Squats 0-40 with YTB around knees 3 x 10; R foot forward        therapeutic activities to improve functional performance for 0 minutes, including:    gait training to improve functional mobility and safety for 0 minutes, including:    Using bilateral crutches and utilizing CAM boot:  -Step-to gait pattern 4x15 feet   -Step-through gait pattern 6x30 feet       Patient Education " and Home Exercises     Home Exercises Provided and Patient Education Provided     Education provided:   - Continue with HEP  - CAM boot with step through gait pattern     Written Home Exercises Provided: Patient instructed to cont prior HEP. Exercises were reviewed and Roxanne was able to demonstrate them prior to the end of the session.  Roxanne demonstrated good  understanding of the education provided. See EMR under Patient Instructions for exercises provided during therapy sessions    ASSESSMENT     Patient continues to demonstrate improved gait mechanics within session. Today I provided cueing to improve toe rocker in terminal stance-pre-swing. Pt able to correct in AlterG. Pt would benefit from further quad/glute med strengthening to improve mechanics in loading response/mid-stance.     Roxanne Is progressing well towards her goals.   Pt prognosis is Good.     Pt will continue to benefit from skilled outpatient physical therapy to address the deficits listed in the problem list box on initial evaluation, provide pt/family education and to maximize pt's level of independence in the home and community environment.     Pt's spiritual, cultural and educational needs considered and pt agreeable to plan of care and goals.     Anticipated barriers to physical therapy: Work schedule     Goals:   Short Term Goals:  6 weeks (progressing, not met)  1.Report decreased ankle pain  < / =  2/10  to increase tolerance for ambulating in tennis shoes  2. Increase ROM to 5 degrees DF in order to walk with min to no compensation.  3. Increase strength by 1/3 MMT grade for ankle intrinsic  to increase tolerance for ADL and work activities.  4. Pt to tolerate HEP to improve ROM and independence with ADL's     Long Term Goals: 18 weeks (progressing, not met)   1.Report decreased ankle pain  < / =  0/10  to increase tolerance for return to work 12 hour shift pain free  2.Patient goal: return to working without ankle pain   3.Increase strength  to 4+/5 for  Ankle PF/DF  to increase tolerance for ADL and work activities.  4. Pt will report at CJ level (20-40% impaired) on Modified FIM score for mobility to demonstrate increase in LE function and mobility in home and community environment.     PLAN   Plan of care Certification: 4/12/2022 to 10/12/2022.    Continue with current plan of care with emphasis on ankle mobility, proximal strengthening, and gait mechanics.       Jose Juan Douglas, PT

## 2022-07-19 ENCOUNTER — CLINICAL SUPPORT (OUTPATIENT)
Dept: REHABILITATION | Facility: HOSPITAL | Age: 32
End: 2022-07-19
Payer: COMMERCIAL

## 2022-07-19 DIAGNOSIS — M25.572 ACUTE LEFT ANKLE PAIN: Primary | ICD-10-CM

## 2022-07-19 PROCEDURE — 97140 MANUAL THERAPY 1/> REGIONS: CPT | Performed by: PHYSICAL THERAPIST

## 2022-07-19 PROCEDURE — 97110 THERAPEUTIC EXERCISES: CPT | Performed by: PHYSICAL THERAPIST

## 2022-07-19 PROCEDURE — 97112 NEUROMUSCULAR REEDUCATION: CPT | Performed by: PHYSICAL THERAPIST

## 2022-07-19 NOTE — PROGRESS NOTES
"[facility]   Physical Therapy Treatment Note     Name: Roxanne Hardy  Clinic Number: [id number]    Therapy Diagnosis:   Encounter Diagnosis   Name Primary?    Acute left ankle pain Yes     Physician: Kaveh Bobo DO    Visit Date: 7/19/2022  Physician Orders: PT Eval and Treat   Medical Diagnosis from Referral: Z98.890 (ICD-10-CM) - Post-operative state  Evaluation Date: 4/12/2022  Authorization Period Expiration: 12/31/2022  Plan of Care Expiration: 10/12/2022  Visit # / Visits authorized: 18/20    PTA Visit #: 0/5     FOTO first follow up:   FOTO second follow up:     Time In: 1350pm   Time Out: 1450pm  Total Billable Time: 60 minutes    Precautions: Weightbearing      Date of Surgery: 3/3/2022      SUBJECTIVE     Pt reports I am coming off 4 straight work days. I was on it Saturday and it hurt. Notes if she switches shoes every other day it helps      She was compliant with home exercise program.  Response to previous treatment: Improved gait tolerance  Functional change: Ambulating no assistive device    Pain: 0/10  Location: left ankle       OBJECTIVE   7/13/2022: Ankle AROM    DF: 5    PF: 45  Returned to wearing tennis shoes this week - decreased edema    Treatment     Roxanne received the treatments listed below:      therapeutic exercises to develop strength, endurance, ROM and flexibility for 25 minutes including:    Pt education on continuation with HEP, gait mechanics, and weight bearing status   Sneaky lunge level 1 10x 10sec UE assist to get full height  Step up 8" with opp high knee controlled lowering 20x  Shuttle 2 feet up/ 1 down 4 x 8 4 1/2 bands  Shuttle heel raise 2 x 20 2 1/2 bands     NT   Self DF stretch 10x 10sec  AlterG ambulatiion 80% BW  2.0 speed with gait feedback on device - 10'  AlterG GSS heel raises 20x 80%  AlterG soleus heel raises 20 80% - NT  Sit<>Stands 15# KB 2 x 10   SL bridges 2 x 15  Seated Soleus Heel raises 10# 2 x 10; 5sec holds   Step up 12" " soft box 2 x 15 forward/lateral     Manual therapy techniques: Joint mobilizations, Soft tissue Mobilization and scar mobs were applied to the: Left ankle for 10 minutes, including:    Ankle ROM Assessment  Talocrural anterior to posterior mobilizations in open packed position grade IV  TC P/A in open packed, grade 4  Subtalar mobilizations grade IV    Not today:   Metatarsal splaying   Scar mobilizations   Mulligans ankle dorsiflexion and posterior/ER/superior glide   Sidelying dorsiflexion with proximal fibular PA mob     Neuromuscular re-education activities to improve: Balance, Coordination, Kinesthetic and Sense for 25 minutes. The following activities were included:    Runner pose soccer ball against the wall 3x to fatigue   SL bridge on [employer] 2 x 15  SL balance paloff press GTB 2 x 15  Stagger stance 6# high low 2 x 15    therapeutic activities to improve functional performance for 0 minutes, including:    gait training to improve functional mobility and safety for 0 minutes, including:    Using bilateral crutches and utilizing CAM boot:  -Step-to gait pattern 4x15 feet   -Step-through gait pattern 6x30 feet       Patient Education and Home Exercises     Home Exercises Provided and Patient Education Provided     Education provided:   - Continue with HEP  - CAM boot with step through gait pattern     Written Home Exercises Provided: Patient instructed to cont prior HEP. Exercises were reviewed and Roxanne was able to demonstrate them prior to the end of the session.  Roxanne demonstrated good  understanding of the education provided. See EMR under Patient Instructions for exercises provided during therapy sessions    ASSESSMENT     Patient progressing with strengthening to LE. Fatigue within session but much improvement. Progressing with gastroc/soleus strength within session, able to progress to eccentrics just weak quad with eccentrics.      Roxanne Is progressing well towards her goals.   Pt prognosis is Good.      Pt will continue to benefit from skilled outpatient physical therapy to address the deficits listed in the problem list box on initial evaluation, provide pt/family education and to maximize pt's level of independence in the home and community environment.     Pt's spiritual, cultural and educational needs considered and pt agreeable to plan of care and goals.     Anticipated barriers to physical therapy: Work schedule     Goals:   Short Term Goals:  6 weeks (progressing, not met)  1.Report decreased ankle pain  < / =  2/10  to increase tolerance for ambulating in tennis shoes  2. Increase ROM to 5 degrees DF in order to walk with min to no compensation.  3. Increase strength by 1/3 MMT grade for ankle intrinsic  to increase tolerance for ADL and work activities.  4. Pt to tolerate HEP to improve ROM and independence with ADL's     Long Term Goals: 18 weeks (progressing, not met)   1.Report decreased ankle pain  < / =  0/10  to increase tolerance for return to work 12 hour shift pain free  2.Patient goal: return to working without ankle pain   3.Increase strength to 4+/5 for  Ankle PF/DF  to increase tolerance for ADL and work activities.  4. Pt will report at CJ level (20-40% impaired) on Modified FIM score for mobility to demonstrate increase in LE function and mobility in home and community environment.     PLAN   Plan of care Certification: 4/12/2022 to 10/12/2022.    Continue with current plan of care with emphasis on ankle mobility, proximal strengthening, and gait mechanics.       Rick Manjarrez, PT

## 2022-07-31 ENCOUNTER — PATIENT MESSAGE (OUTPATIENT)
Dept: DERMATOLOGY | Facility: CLINIC | Age: 32
End: 2022-07-31
Payer: COMMERCIAL

## 2022-08-01 ENCOUNTER — PATIENT MESSAGE (OUTPATIENT)
Dept: OPTOMETRY | Facility: CLINIC | Age: 32
End: 2022-08-01
Payer: COMMERCIAL

## 2022-08-01 ENCOUNTER — CLINICAL SUPPORT (OUTPATIENT)
Dept: REHABILITATION | Facility: HOSPITAL | Age: 32
End: 2022-08-01
Payer: COMMERCIAL

## 2022-08-01 DIAGNOSIS — M25.572 ACUTE LEFT ANKLE PAIN: Primary | ICD-10-CM

## 2022-08-01 PROCEDURE — 97112 NEUROMUSCULAR REEDUCATION: CPT | Performed by: PHYSICAL THERAPIST

## 2022-08-01 NOTE — PROGRESS NOTES
"[facility]   Physical Therapy Treatment Note     Name: Roxanne Hardy  Clinic Number: [id number]    Therapy Diagnosis:   Encounter Diagnosis   Name Primary?    Acute left ankle pain Yes     Physician: Kaveh Bobo DO    Visit Date: 8/1/2022  Physician Orders: PT Eval and Treat   Medical Diagnosis from Referral: Z98.890 (ICD-10-CM) - Post-operative state  Evaluation Date: 4/12/2022  Authorization Period Expiration: 12/31/2022  Plan of Care Expiration: 10/12/2022  Visit # / Visits authorized: 19/20    PTA Visit #: 0/5     FOTO first follow up:   FOTO second follow up:     Time In: 700am   Time Out: 0758 am  Total Billable Time: 14 minutes    Precautions: Weightbearing      Date of Surgery: 3/3/2022      SUBJECTIVE     Pt reports Finished my clinicals. Reports foot is doing well, went on bachelorette without pain      She was compliant with home exercise program.  Response to previous treatment: Improved gait tolerance  Functional change: Ambulating no assistive device    Pain: 0/10  Location: left ankle       OBJECTIVE   7/13/2022: Ankle AROM    DF: 5    PF: 45  Returned to wearing tennis shoes this week - decreased edema    Treatment     Roxanne received the treatments listed below:      therapeutic exercises to develop strength, endurance, ROM and flexibility for 28 minutes including:    Pt education on continuation with HEP, gait mechanics, and weight bearing status   Sled push 45# 2 laps  [facility] split squat 3 x 15 heel elevated  SL sit to stand 20" box 2 x 15    NT   Sneaky lunge level 1 10x 10sec UE assist to get full height  Step up 8" with opp high knee controlled lowering 20x  Shuttle 2 feet up/ 1 down 4 x 8 4 1/2 bands  Shuttle heel raise 2 x 20 2 1/2 bands   Self DF stretch 10x 10sec  AlterG ambulatiion 80% BW  2.0 speed with gait feedback on device - 10'  AlterG GSS heel raises 20x 80%  AlterG soleus heel raises 20 80% - NT  Sit<>Stands 15# KB 2 x 10   SL bridges 2 x " "15  Seated Soleus Heel raises 10# 2 x 10; 5sec holds   Step up 12" soft box 2 x 15 forward/lateral     Manual therapy techniques: Joint mobilizations, Soft tissue Mobilization and scar mobs were applied to the: Left ankle for 10 minutes, including:    Ankle ROM Assessment  Talocrural anterior to posterior mobilizations in open packed position grade IV  TC P/A in open packed, grade 4  Subtalar mobilizations grade IV  Metatarsal splaying   Scar mobilizations   Great toe mob Gr IV extension and IP joint    Not today:     Mulligans ankle dorsiflexion and posterior/ER/superior glide   Sidelying dorsiflexion with proximal fibular PA mob     Neuromuscular re-education activities to improve: Balance, Coordination, Kinesthetic and Sense for 20 minutes. The following activities were included:    SL cone taps 15x  Forward/side lunge onto BOSU 2 x 15  TRX lunge position heel raise 2 x 10 3" hold max height    NT  Runner pose soccer ball against the wall 3x to fatigue   SL bridge on dynadisc 2 x 15  SL balance paloff press GTB 2 x 15  Stagger stance 6# high low 2 x 15    therapeutic activities to improve functional performance for 0 minutes, including:    gait training to improve functional mobility and safety for 0 minutes, including:    Using bilateral crutches and utilizing CAM boot:  -Step-to gait pattern 4x15 feet   -Step-through gait pattern 6x30 feet       Patient Education and Home Exercises     Home Exercises Provided and Patient Education Provided     Education provided:   - Continue with HEP  - CAM boot with step through gait pattern     Written Home Exercises Provided: Patient instructed to cont prior HEP. Exercises were reviewed and Roxanne was able to demonstrate them prior to the end of the session.  Roxanne demonstrated good  understanding of the education provided. See EMR under Patient Instructions for exercises provided during therapy sessions    ASSESSMENT     Patient progressed with GSS and single leg loading. She " is still unable to SL heel raise. She was limited mobility to the great toe and subtalar joint, hypermobile mid foot. Weakness to eversion 4-/5. Improved tolerance to single leg stance, able to progress to dynamic stability    Roxanne Is progressing well towards her goals.   Pt prognosis is Good.     Pt will continue to benefit from skilled outpatient physical therapy to address the deficits listed in the problem list box on initial evaluation, provide pt/family education and to maximize pt's level of independence in the home and community environment.     Pt's spiritual, cultural and educational needs considered and pt agreeable to plan of care and goals.     Anticipated barriers to physical therapy: Work schedule     Goals:   Short Term Goals:  6 weeks (progressing, not met)  1.Report decreased ankle pain  < / =  2/10  to increase tolerance for ambulating in tennis shoes  2. Increase ROM to 5 degrees DF in order to walk with min to no compensation.  3. Increase strength by 1/3 MMT grade for ankle intrinsic  to increase tolerance for ADL and work activities.  4. Pt to tolerate HEP to improve ROM and independence with ADL's     Long Term Goals: 18 weeks (progressing, not met)   1.Report decreased ankle pain  < / =  0/10  to increase tolerance for return to work 12 hour shift pain free  2.Patient goal: return to working without ankle pain   3.Increase strength to 4+/5 for  Ankle PF/DF  to increase tolerance for ADL and work activities.  4. Pt will report at CJ level (20-40% impaired) on Modified FIM score for mobility to demonstrate increase in LE function and mobility in home and community environment.     PLAN   Plan of care Certification: 4/12/2022 to 10/12/2022.    Continue with current plan of care with emphasis on ankle mobility, proximal strengthening, and gait mechanics.       Rick Manjarrez, PT

## 2022-08-02 DIAGNOSIS — Z30.41 ENCOUNTER FOR SURVEILLANCE OF CONTRACEPTIVE PILLS: ICD-10-CM

## 2022-08-03 ENCOUNTER — CLINICAL SUPPORT (OUTPATIENT)
Dept: REHABILITATION | Facility: HOSPITAL | Age: 32
End: 2022-08-03
Payer: COMMERCIAL

## 2022-08-03 DIAGNOSIS — M25.572 ACUTE LEFT ANKLE PAIN: Primary | ICD-10-CM

## 2022-08-03 PROCEDURE — 97110 THERAPEUTIC EXERCISES: CPT | Performed by: PHYSICAL THERAPIST

## 2022-08-03 PROCEDURE — 97112 NEUROMUSCULAR REEDUCATION: CPT | Performed by: PHYSICAL THERAPIST

## 2022-08-03 RX ORDER — NORETHINDRONE ACETATE AND ETHINYL ESTRADIOL .02; 1 MG/1; MG/1
1 TABLET ORAL DAILY
Qty: 84 TABLET | Refills: 3 | Status: SHIPPED | OUTPATIENT
Start: 2022-08-03 | End: 2023-11-30

## 2022-08-03 NOTE — PROGRESS NOTES
"[facility]   Physical Therapy Treatment Note     Name: Roxanne Hardy  Clinic Number: [id number]    Therapy Diagnosis:   Encounter Diagnosis   Name Primary?    Acute left ankle pain Yes     Physician: Kaveh Bobo DO    Visit Date: 8/3/2022  Physician Orders: PT Eval and Treat   Medical Diagnosis from Referral: Z98.890 (ICD-10-CM) - Post-operative state  Evaluation Date: 4/12/2022  Authorization Period Expiration: 12/31/2022  Plan of Care Expiration: 10/12/2022  Visit # / Visits authorized: 20/20    PTA Visit #: 0/5     FOTO first follow up:   FOTO second follow up:     Time In: 0900am   Time Out: 1000 am  Total Billable Time: 14 minutes    Precautions: Weightbearing      Date of Surgery: 3/3/2022      SUBJECTIVE     Pt reports Worked yesterday and it does well except when I stand up from sitting a long time      She was compliant with home exercise program.  Response to previous treatment: Improved gait tolerance  Functional change: Ambulating no assistive device    Pain: 0/10  Location: left ankle       OBJECTIVE   7/13/2022: Ankle AROM    DF: 5    PF: 45  Returned to wearing tennis shoes this week - decreased edema    Treatment     Roxanne received the treatments listed below:      therapeutic exercises to develop strength, endurance, ROM and flexibility for 20 minutes including:    Pt education on continuation with HEP, gait mechanics, and weight bearing status   Retro lunge 5" 2 x 15  Walking heel raises holding 5# 2 laps      NT   Sneaky lunge level 1 10x 10sec UE assist to get full height  Step up 8" with opp high knee controlled lowering 20x  Shuttle 2 feet up/ 1 down 4 x 8 4 1/2 bands  Shuttle heel raise 2 x 20 2 1/2 bands   Self DF stretch 10x 10sec  AlterG ambulatiion 80% BW  2.0 speed with gait feedback on device - 10'  AlterG GSS heel raises 20x 80%  AlterG soleus heel raises 20 80% - NT  Sit<>Stands 15# KB 2 x 10   SL bridges 2 x 15  Seated Soleus Heel raises 10# 2 x 10; " "5sec holds   Step up 12" soft box 2 x 15 forward/lateral     Manual therapy techniques: Joint mobilizations, Soft tissue Mobilization and scar mobs were applied to the: Left ankle for 10 minutes, including:    Ankle ROM Assessment  Talocrural anterior to posterior mobilizations in open packed position grade IV  TC P/A in open packed, grade 4  Subtalar mobilizations grade IV  Metatarsal splaying   Scar mobilizations   Great toe mob Gr IV extension and IP joint    Not today:     Mulligans ankle dorsiflexion and posterior/ER/superior glide   Sidelying dorsiflexion with proximal fibular PA mob     Neuromuscular re-education activities to improve: Balance, Coordination, Kinesthetic and Sense for 30 minutes. The following activities were included:    Heel tap 3" UE support 3 x 10  SL balance rebounder 30 throws  SL bridge dynadisc 4 x 10  Mod side plank clam 4 x 10 GTB    NT  SL cone taps 15x  Forward/side lunge onto BOSU 2 x 15  TRX lunge position heel raise 2 x 10 3" hold max height  Runner pose soccer ball against the wall 3x to fatigue   SL bridge on dynadisc 2 x 15  SL balance paloff press GTB 2 x 15  Stagger stance 6# high low 2 x 15    therapeutic activities to improve functional performance for 0 minutes, including:    gait training to improve functional mobility and safety for 0 minutes, including:    Using bilateral crutches and utilizing CAM boot:  -Step-to gait pattern 4x15 feet   -Step-through gait pattern 6x30 feet       Patient Education and Home Exercises     Home Exercises Provided and Patient Education Provided     Education provided:   - Continue with HEP  - CAM boot with step through gait pattern     Written Home Exercises Provided: Patient instructed to cont prior HEP. Exercises were reviewed and Roxanne was able to demonstrate them prior to the end of the session.  Roxanne demonstrated good  understanding of the education provided. See EMR under Patient Instructions for exercises provided during therapy " "sessions    ASSESSMENT     Roxanne presented to clinic with soreness to quad, addressed glut and eccentrics. Notes difficulty with going downstairs and did well with 3" step training in mirror. Rebounder to moving outside her LOUANN with single leg balance.     Roxanne Is progressing well towards her goals.   Pt prognosis is Good.     Pt will continue to benefit from skilled outpatient physical therapy to address the deficits listed in the problem list box on initial evaluation, provide pt/family education and to maximize pt's level of independence in the home and community environment.     Pt's spiritual, cultural and educational needs considered and pt agreeable to plan of care and goals.     Anticipated barriers to physical therapy: Work schedule     Goals:   Short Term Goals:  6 weeks (progressing, not met)  1.Report decreased ankle pain  < / =  2/10  to increase tolerance for ambulating in tennis shoes  2. Increase ROM to 5 degrees DF in order to walk with min to no compensation.  3. Increase strength by 1/3 MMT grade for ankle intrinsic  to increase tolerance for ADL and work activities.  4. Pt to tolerate HEP to improve ROM and independence with ADL's     Long Term Goals: 18 weeks (progressing, not met)   1.Report decreased ankle pain  < / =  0/10  to increase tolerance for return to work 12 hour shift pain free  2.Patient goal: return to working without ankle pain   3.Increase strength to 4+/5 for  Ankle PF/DF  to increase tolerance for ADL and work activities.  4. Pt will report at CJ level (20-40% impaired) on Modified FIM score for mobility to demonstrate increase in LE function and mobility in home and community environment.     PLAN   Plan of care Certification: 4/12/2022 to 10/12/2022.    Continue with current plan of care with emphasis on ankle mobility, proximal strengthening, and gait mechanics.       Rick Manjarrez, PT   "

## 2022-08-05 ENCOUNTER — TELEPHONE (OUTPATIENT)
Dept: OBSTETRICS AND GYNECOLOGY | Facility: CLINIC | Age: 32
End: 2022-08-05
Payer: COMMERCIAL

## 2022-08-05 ENCOUNTER — PATIENT MESSAGE (OUTPATIENT)
Dept: OBSTETRICS AND GYNECOLOGY | Facility: CLINIC | Age: 32
End: 2022-08-05
Payer: COMMERCIAL

## 2022-08-10 ENCOUNTER — CLINICAL SUPPORT (OUTPATIENT)
Dept: REHABILITATION | Facility: HOSPITAL | Age: 32
End: 2022-08-10
Payer: COMMERCIAL

## 2022-08-10 DIAGNOSIS — M25.572 ACUTE LEFT ANKLE PAIN: Primary | ICD-10-CM

## 2022-08-10 PROCEDURE — 97110 THERAPEUTIC EXERCISES: CPT | Performed by: PHYSICAL THERAPIST

## 2022-08-10 PROCEDURE — 97140 MANUAL THERAPY 1/> REGIONS: CPT | Performed by: PHYSICAL THERAPIST

## 2022-08-10 NOTE — PROGRESS NOTES
"[facility]   Physical Therapy Treatment Note     Name: Roxanne Hardy  Clinic Number: [id number]    Therapy Diagnosis:   Encounter Diagnosis   Name Primary?    Acute left ankle pain Yes     Physician: Kaveh Bobo DO    Visit Date: 8/10/2022  Physician Orders: PT Eval and Treat   Medical Diagnosis from Referral: Z98.890 (ICD-10-CM) - Post-operative state  Evaluation Date: 4/12/2022  Authorization Period Expiration: 12/31/2022  Plan of Care Expiration: 10/12/2022  Visit # / Visits authorized: 21/25    PTA Visit #: 0/5     FOTO first follow up:   FOTO second follow up:     Time In: 0900am   Time Out: 1000 am  Total Billable Time: 60 minutes    Precautions: Weightbearing      Date of Surgery: 3/3/2022      SUBJECTIVE     Pt reports Some discomfort around front of my ankle and a little behind     She was compliant with home exercise program.  Response to previous treatment: Improved gait tolerance  Functional change: Ambulating no assistive device    Pain: 0/10  Location: left ankle       OBJECTIVE   7/13/2022: Ankle AROM    DF: 5    PF: 45  Returned to wearing tennis shoes this week - decreased edema    Treatment     Roxanne received the treatments listed below:      therapeutic exercises to develop strength, endurance, ROM and flexibility for 46 minutes including:    Pt education on continuation with HEP, gait mechanics, and weight bearing status   Lunges 10# weights 2 x 15 B   Triple extension at wall 3 x 10  Ankle inv/ev BTB 2 x 10 ea  AlterG ambulatiion 80% BW  2.0 speed with 3% incline and gait feedback on device - 10'  Heel tap 6" step 2 x 10  Sidelying hip abd 2 x 10    NT   Sneaky lunge level 1 10x 10sec UE assist to get full height  Step up 8" with opp high knee controlled lowering 20x  Shuttle 2 feet up/ 1 down 4 x 8 4 1/2 bands  Shuttle heel raise 2 x 20 2 1/2 bands   Self DF stretch 10x 10sec  AlterG GSS heel raises 20x 80%  AlterG soleus heel raises 20 80% - NT      Manual " "therapy techniques: Joint mobilizations, Soft tissue Mobilization and scar mobs were applied to the: Left ankle for 14 minutes, including:    Ankle ROM Assessment  Talocrural anterior to posterior mobilizations in open packed position grade IV  TC P/A in open packed, grade 4  Subtalar mobilizations grade IV  Metatarsal splaying   Scar mobilizations   Great toe mob Gr IV extension and IP joint  Gr V medial cuneiform AP    Not today:     Mulligans ankle dorsiflexion and posterior/ER/superior glide   Sidelying dorsiflexion with proximal fibular PA mob     Neuromuscular re-education activities to improve: Balance, Coordination, Kinesthetic and Sense for 0 minutes. The following activities were included:    Heel tap 3" UE support 3 x 10  SL balance rebounder 30 throws  SL bridge dynadisc 4 x 10  Mod side plank clam 4 x 10 GTB    NT  SL cone taps 15x  Forward/side lunge onto BOSU 2 x 15  TRX lunge position heel raise 2 x 10 3" hold max height  Runner pose soccer ball against the wall 3x to fatigue   SL bridge on dynadisc 2 x 15  SL balance paloff press GTB 2 x 15  Stagger stance 6# high low 2 x 15    therapeutic activities to improve functional performance for 0 minutes, including:    gait training to improve functional mobility and safety for 0 minutes, including:    Using bilateral crutches and utilizing CAM boot:  -Step-to gait pattern 4x15 feet   -Step-through gait pattern 6x30 feet       Patient Education and Home Exercises     Home Exercises Provided and Patient Education Provided     Education provided:   - Continue with HEP  - CAM boot with step through gait pattern     Written Home Exercises Provided: Patient instructed to cont prior HEP. Exercises were reviewed and Roxanne was able to demonstrate them prior to the end of the session.  Roxanne demonstrated good  understanding of the education provided. See EMR under Patient Instructions for exercises provided during therapy sessions    ASSESSMENT     Roxanne got relief " from midfoot manipulation to improve joitn mechanics. She has excellent DF and improved function on stairs noted by patient. Patient weak to inv/ev, added BTB today for HEP. Triple extension limited height at wall. Limited eccentric quad with heel tap. Fatigued glut in sidelying end of session and John Oliveira Is progressing well towards her goals.   Pt prognosis is Good.     Pt will continue to benefit from skilled outpatient physical therapy to address the deficits listed in the problem list box on initial evaluation, provide pt/family education and to maximize pt's level of independence in the home and community environment.     Pt's spiritual, cultural and educational needs considered and pt agreeable to plan of care and goals.     Anticipated barriers to physical therapy: Work schedule     Goals:   Short Term Goals:  6 weeks (progressing, not met)  1.Report decreased ankle pain  < / =  2/10  to increase tolerance for ambulating in tennis shoes  2. Increase ROM to 5 degrees DF in order to walk with min to no compensation.  3. Increase strength by 1/3 MMT grade for ankle intrinsic  to increase tolerance for ADL and work activities.  4. Pt to tolerate HEP to improve ROM and independence with ADL's     Long Term Goals: 18 weeks (progressing, not met)   1.Report decreased ankle pain  < / =  0/10  to increase tolerance for return to work 12 hour shift pain free  2.Patient goal: return to working without ankle pain   3.Increase strength to 4+/5 for  Ankle PF/DF  to increase tolerance for ADL and work activities.  4. Pt will report at CJ level (20-40% impaired) on Modified FIM score for mobility to demonstrate increase in LE function and mobility in home and community environment.     PLAN   Plan of care Certification: 4/12/2022 to 10/12/2022.    Continue with current plan of care with emphasis on ankle mobility, proximal strengthening, and gait mechanics.       Rick Manjarrez, PT

## 2022-08-15 ENCOUNTER — CLINICAL SUPPORT (OUTPATIENT)
Dept: REHABILITATION | Facility: HOSPITAL | Age: 32
End: 2022-08-15
Payer: COMMERCIAL

## 2022-08-15 DIAGNOSIS — M25.572 ACUTE LEFT ANKLE PAIN: Primary | ICD-10-CM

## 2022-08-15 PROCEDURE — 97140 MANUAL THERAPY 1/> REGIONS: CPT

## 2022-08-15 PROCEDURE — 97110 THERAPEUTIC EXERCISES: CPT

## 2022-08-15 NOTE — PROGRESS NOTES
"[facility]   Physical Therapy Treatment Note     Name: Roxanne Hardy  Clinic Number: [id number]    Therapy Diagnosis:   Encounter Diagnosis   Name Primary?   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 She report she has been doing fine.      She was compliant with home exercise program.  Response to previous treatment: Improved gait tolerance  Functional change: Ambulating no assistive device    Pain: 0/10  Location: left ankle       OBJECTIVE   8/15/2022: Ankle AROM    DF: 8    PF: 50  Returned to wearing tennis shoes this week - decreased edema    Treatment     Roxanne received the treatments listed below:      therapeutic exercises to develop strength, endurance, ROM and flexibility for 52 minutes including:    Pt education on continuation with HEP, gait mechanics, and weight bearing status  Supine Bridges with GTB 3 x 10   Seated Post Tib Eccentrics GTB 3 x 8   Seated PF and Ever with GTB 3 x 8  Seated SL Heel raise 26#  3 x 10   SL Shuttle Press 82.5# 3 x 10 with GTB above knee  Standing Clams in mini squat on wall 2 x 10 B    Lunges 10# weights 2 laps of 15 feet B   Lunges without weight 2 laps of 15 feet B   AlterG ambulatiion 80% BW  2.0 speed with 3% incline and gait feedback on device - 10'  Heel tap 6" step 2 x 10    NT   Triple extension at wall 3 x 10  Ankle inv/ev BTB 2 x 10 ea  Sneaky lunge level 1 10x " "10sec UE assist to get full height  Step up 8" with opp high knee controlled lowering 20x  Shuttle 2 feet up/ 1 down 4 x 8 4 1/2 bands  Shuttle heel raise 2 x 20 2 1/2 bands   Self DF stretch 10x 10sec  AlterG GSS heel raises 20x 80%  AlterG soleus heel raises 20 80% - NT      Manual therapy techniques: Joint mobilizations, Soft tissue Mobilization and scar mobs were applied to the: Left ankle for 8 minutes, including:    Ankle ROM Assessment  Ankle joint mobility assessment: hypermobile mid foot; TC motion is normalized with side to side comparison      Not today:   Talocrural anterior to posterior mobilizations in open packed position grade IV  TC P/A in open packed, grade 4  Subtalar mobilizations grade IV  Metatarsal splaying   Scar mobilizations   Great toe mob Gr IV extension and IP joint  Gr V medial cuneiform AP    Neuromuscular re-education activities to improve: Balance, Coordination, Kinesthetic and Sense for 0 minutes. The following activities were included:    Heel tap 3" UE support 3 x 10  SL balance rebounder 30 throws  SL bridge dynadisc 4 x 10  Mod side plank clam 4 x 10 GTB    NT  SL cone taps 15x  Forward/side lunge onto BOSU 2 x 15  TRX lunge position heel raise 2 x 10 3" hold max height  Runner pose soccer ball against the wall 3x to fatigue   SL bridge on dynadisc 2 x 15  SL balance paloff press GTB 2 x 15  Stagger stance 6# high low 2 x 15    therapeutic activities to improve functional performance for 0 minutes, including:    gait training to improve functional mobility and safety for 0 minutes, including:    Using bilateral crutches and utilizing CAM boot:  -Step-to gait pattern 4x15 feet   -Step-through gait pattern 6x30 feet       Patient Education and Home Exercises     Home Exercises Provided and Patient Education Provided     Education provided:   - Continue with HEP  - CAM boot with step through gait pattern     Written Home Exercises Provided: Patient instructed to cont prior HEP. " Exercises were reviewed and Roxanne was able to demonstrate them prior to the end of the session.  Roxanne demonstrated good  understanding of the education provided. See EMR under Patient Instructions for exercises provided during therapy sessions    ASSESSMENT     Roxanne continues to demonstrate the inability to perform a DL Heel raise or SL heel raise. She would benefit from further isolated gastroc strengthening. [facility] provided for her to perform ankle PF with at home. She demonstrated the ability to activate her glutes with verbal cues with lunges today and avoid knee valgus.      Roxanne Is progressing well towards her goals.   Pt prognosis is Good.     Pt will continue to benefit from skilled outpatient physical therapy to address the deficits listed in the problem list box on initial evaluation, provide pt/family education and to maximize pt's level of independence in the home and community environment.     Pt's spiritual, cultural and educational needs considered and pt agreeable to plan of care and goals.     Anticipated barriers to physical therapy: Work schedule     Goals:   Short Term Goals:  6 weeks (progressing, not met)  1.Report decreased ankle pain  < / =  2/10  to increase tolerance for ambulating in tennis shoes  2. Increase ROM to 5 degrees DF in order to walk with min to no compensation.  3. Increase strength by 1/3 MMT grade for ankle intrinsic  to increase tolerance for ADL and work activities.  4. Pt to tolerate HEP to improve ROM and independence with ADL's     Long Term Goals: 18 weeks (progressing, not met)   1.Report decreased ankle pain  < / =  0/10  to increase tolerance for return to work 12 hour shift pain free  2.Patient goal: return to working without ankle pain   3.Increase strength to 4+/5 for  Ankle PF/DF  to increase tolerance for ADL and work activities.  4. Pt will report at CJ level (20-40% impaired) on Modified FIM score for mobility to demonstrate increase in LE function and mobility  in home and community environment.     PLAN   Plan of care Certification: 4/12/2022 to 10/12/2022.    Continue with current plan of care with emphasis on ankle mobility, proximal strengthening, and gait mechanics.       Jose Juan Douglas, PT

## 2022-08-19 ENCOUNTER — CLINICAL SUPPORT (OUTPATIENT)
Dept: REHABILITATION | Facility: HOSPITAL | Age: 32
End: 2022-08-19
Payer: COMMERCIAL

## 2022-08-19 DIAGNOSIS — M25.572 ACUTE LEFT ANKLE PAIN: Primary | ICD-10-CM

## 2022-08-19 PROCEDURE — 97140 MANUAL THERAPY 1/> REGIONS: CPT | Performed by: PHYSICAL THERAPIST

## 2022-08-19 PROCEDURE — 97110 THERAPEUTIC EXERCISES: CPT | Performed by: PHYSICAL THERAPIST

## 2022-08-19 NOTE — PROGRESS NOTES
"[facility]   Physical Therapy Treatment Note     Name: Roxanne Hardy  Clinic Number: [id number]    Therapy Diagnosis:   Encounter Diagnosis   Name Primary?    Acute left ankle pain Yes     Physician: Kaveh Bobo DO    Visit Date: 8/19/2022  Physician Orders: PT Eval and Treat   Medical Diagnosis from Referral: Z98.890 (ICD-10-CM) - Post-operative state  Evaluation Date: 4/12/2022  Authorization Period Expiration: 12/31/2022  Plan of Care Expiration: 10/12/2022  Visit # / Visits authorized: 23/25    PTA Visit #: 0/5     FOTO first follow up:   FOTO second follow up:     Time In: 1000am   Time Out: 1105am  Total Billable Time: 35 minutes    Precautions: Weightbearing      Date of Surgery: 3/3/2022    SUBJECTIVE     Pt reports: My walking is getting better, just sore behind my ankle      She was compliant with home exercise program.  Response to previous treatment: Improved gait tolerance  Functional change: Ambulating no assistive device    Pain: 0/10  Location: left ankle       OBJECTIVE   8/15/2022: Ankle AROM    DF: 8    PF: 50  Returned to wearing tennis shoes this week - decreased edema    Treatment     Roxanne received the treatments listed below:      therapeutic exercises to develop strength, endurance, ROM and flexibility for 54 minutes including:    Pt education on continuation with HEP, gait mechanics, and weight bearing status  Runners stretch at wall with heel raises 3x to fatigue B   Wall squat RLE elevated 4x to fatigue  Assisted heel raise with sport cord single leg 3 x 10 10"   [facility] split squat 3 x 15   Rebounder 2# med ball 2 directions 4 x 10  RDL with 6# med ball to cone tap 4 x 10  Standing Clams in mini squat on wall 2 x 10 B    AlterG ambulatiion 90% BW  2.0 speed with 5% incline and gait feedback on device - 10'    NT   Triple extension at wall 3 x 10  Ankle inv/ev BTB 2 x 10 ea  Sneaky lunge level 1 10x 10sec UE assist to get full height  Step up 8" with opp " "high knee controlled lowering 20x  Shuttle 2 feet up/ 1 down 4 x 8 4 1/2 bands  Shuttle heel raise 2 x 20 2 1/2 bands   Self DF stretch 10x 10sec  AlterG GSS heel raises 20x 80%  AlterG soleus heel raises 20 80% - NT      Manual therapy techniques: Joint mobilizations, Soft tissue Mobilization and scar mobs were applied to the: Left ankle for 11 minutes, including:    Talocrural anterior to posterior mobilizations in open packed position grade IV  TC P/A in open packed, grade 4  Subtalar mobilizations grade IV  Metatarsal splaying   Scar mobilizations   Great toe mob Gr IV extension and IP joint  Gr V medial cuneiform AP      Not today:     Neuromuscular re-education activities to improve: Balance, Coordination, Kinesthetic and Sense for 0 minutes. The following activities were included:    Heel tap 3" UE support 3 x 10  SL balance rebounder 30 throws  SL bridge dynadisc 4 x 10  Mod side plank clam 4 x 10 GTB    NT  SL cone taps 15x  Forward/side lunge onto BOSU 2 x 15  TRX lunge position heel raise 2 x 10 3" hold max height  Runner pose soccer ball against the wall 3x to fatigue   SL bridge on dynadisc 2 x 15  SL balance paloff press GTB 2 x 15  Stagger stance 6# high low 2 x 15    therapeutic activities to improve functional performance for 0 minutes, including:    gait training to improve functional mobility and safety for 0 minutes, including:    Using bilateral crutches and utilizing CAM boot:  -Step-to gait pattern 4x15 feet   -Step-through gait pattern 6x30 feet       Patient Education and Home Exercises     Home Exercises Provided and Patient Education Provided     Education provided:   - Continue with HEP  - CAM boot with step through gait pattern     Written Home Exercises Provided: Patient instructed to cont prior HEP. Exercises were reviewed and Roxanne was able to demonstrate them prior to the end of the session.  Roxanne demonstrated good  understanding of the education provided. See EMR under Patient " Instructions for exercises provided during therapy sessions    ASSESSMENT     Roxanne progressed with single leg loading today. Previously unable perform the RDL with med ball, did well today and felt confident knowing her ankle is improving. Glut fatigue with clams at the wall. Increased incline for continued GSS strengthening on alterG    Roxanne Is progressing well towards her goals.   Pt prognosis is Good.     Pt will continue to benefit from skilled outpatient physical therapy to address the deficits listed in the problem list box on initial evaluation, provide pt/family education and to maximize pt's level of independence in the home and community environment.     Pt's spiritual, cultural and educational needs considered and pt agreeable to plan of care and goals.     Anticipated barriers to physical therapy: Work schedule     Goals:   Short Term Goals:  6 weeks (progressing, not met)  1.Report decreased ankle pain  < / =  2/10  to increase tolerance for ambulating in tennis shoes  2. Increase ROM to 5 degrees DF in order to walk with min to no compensation.  3. Increase strength by 1/3 MMT grade for ankle intrinsic  to increase tolerance for ADL and work activities.  4. Pt to tolerate HEP to improve ROM and independence with ADL's     Long Term Goals: 18 weeks (progressing, not met)   1.Report decreased ankle pain  < / =  0/10  to increase tolerance for return to work 12 hour shift pain free  2.Patient goal: return to working without ankle pain   3.Increase strength to 4+/5 for  Ankle PF/DF  to increase tolerance for ADL and work activities.  4. Pt will report at CJ level (20-40% impaired) on Modified FIM score for mobility to demonstrate increase in LE function and mobility in home and community environment.     PLAN   Plan of care Certification: 4/12/2022 to 10/12/2022.    Continue with current plan of care with emphasis on ankle mobility, proximal strengthening, and gait mechanics.       Rick Manjarrez, PT

## 2022-08-29 ENCOUNTER — PATIENT MESSAGE (OUTPATIENT)
Dept: GASTROENTEROLOGY | Facility: CLINIC | Age: 32
End: 2022-08-29
Payer: COMMERCIAL

## 2022-08-30 ENCOUNTER — CLINICAL SUPPORT (OUTPATIENT)
Dept: REHABILITATION | Facility: HOSPITAL | Age: 32
End: 2022-08-30
Payer: COMMERCIAL

## 2022-08-30 DIAGNOSIS — M25.572 ACUTE LEFT ANKLE PAIN: Primary | ICD-10-CM

## 2022-08-30 PROCEDURE — 97140 MANUAL THERAPY 1/> REGIONS: CPT | Performed by: PHYSICAL THERAPIST

## 2022-08-30 PROCEDURE — 97110 THERAPEUTIC EXERCISES: CPT | Performed by: PHYSICAL THERAPIST

## 2022-08-30 PROCEDURE — 97112 NEUROMUSCULAR REEDUCATION: CPT | Performed by: PHYSICAL THERAPIST

## 2022-08-30 NOTE — PROGRESS NOTES
"[facility]   Physical Therapy Treatment Note     Name: Roxanne Hardy  Clinic Number: [id number]    Therapy Diagnosis:   Encounter Diagnosis   Name Primary?    Acute left ankle pain Yes     Physician: Kaveh Bobo DO    Visit Date: 8/30/2022  Physician Orders: PT Eval and Treat   Medical Diagnosis from Referral: Z98.890 (ICD-10-CM) - Post-operative state  Evaluation Date: 4/12/2022  Authorization Period Expiration: 12/31/2022  Plan of Care Expiration: 10/12/2022  Visit # / Visits authorized: 24/25    PTA Visit #: 0/5     FOTO first follow up:   FOTO second follow up:     Time In: 0900am   Time Out: 1005am  Total Billable Time: 65 minutes    Precautions: Weightbearing      Date of Surgery: 3/3/2022    SUBJECTIVE     Pt reports: MD said my pain on top of the foot may be from a screw but to wait a year before removing the screw      She was compliant with home exercise program.  Response to previous treatment: Improved gait tolerance  Functional change: Ambulating no assistive device    Pain: 0/10  Location: left ankle       OBJECTIVE   8/15/2022: Ankle AROM    DF: 8    PF: 50  Returned to wearing tennis shoes this week - decreased edema    Treatment     Roxanne received the treatments listed below:      therapeutic exercises to develop strength, endurance, ROM and flexibility for 25 minutes including:    Pt education on continuation with HEP, gait mechanics, and weight bearing status  Split squat into heel raise 2 x 10  Bridge with sliders 2 x 15    NT   Runners stretch at wall with heel raises 3x to fatigue B   Wall squat RLE elevated 4x to fatigue  Assisted heel raise with sport cord single leg 3 x 10 10"   [facility] split squat 3 x 15   Rebounder 2# med ball 2 directions 4 x 10  RDL with 6# med ball to cone tap 4 x 10  Standing Clams in mini squat on wall 2 x 10 B    AlterG ambulatiion 90% BW  2.0 speed with 5% incline and gait feedback on device - 10'  Triple extension at wall 3 x " "10  Ankle inv/ev BTB 2 x 10 ea  Sneaky lunge level 1 10x 10sec UE assist to get full height  Step up 8" with opp high knee controlled lowering 20x  Shuttle 2 feet up/ 1 down 4 x 8 4 1/2 bands      Manual therapy techniques: Joint mobilizations, Soft tissue Mobilization and scar mobs were applied to the: Left ankle for 8 minutes, including:    Talocrural anterior to posterior mobilizations in open packed position grade IV  TC P/A in open packed, grade 4  Subtalar mobilizations grade IV  Metatarsal splaying   Scar mobilizations   Great toe mob Gr IV extension and IP joint  Gr V medial cuneiform AP-nt    Neuromuscular re-education activities to improve: Balance, Coordination, Kinesthetic and Sense for 27 minutes. The following activities were included:    Ankle rockerboard Inv/Ev 3'  Rockerboard squats behind mat 3 x 10  Side lunge onto BOSU 2 x 15 mirror feedback  SL RDL into plate press 2 x 10 10#    NT  Heel tap 3" UE support 3 x 10  SL balance rebounder 30 throws  SL bridge dynadisc 4 x 10  Mod side plank clam 4 x 10 GTB  SL cone taps 15x  Forward/side lunge onto BOSU 2 x 15  TRX lunge position heel raise 2 x 10 3" hold max height  Runner pose soccer ball against the wall 3x to fatigue   SL bridge on dynadisc 2 x 15  SL balance paloff press GTB 2 x 15  Stagger stance 6# high low 2 x 15    therapeutic activities to improve functional performance for 0 minutes, including:    gait training to improve functional mobility and safety for 0 minutes, including:    Using bilateral crutches and utilizing CAM boot:  -Step-to gait pattern 4x15 feet   -Step-through gait pattern 6x30 feet       Patient Education and Home Exercises     Home Exercises Provided and Patient Education Provided     Education provided:   - Continue with HEP  - CAM boot with step through gait pattern     Written Home Exercises Provided: Patient instructed to cont prior HEP. Exercises were reviewed and Roxanne was able to demonstrate them prior to the end " of the session.  Roxanne demonstrated good  understanding of the education provided. See EMR under Patient Instructions for exercises provided during therapy sessions    ASSESSMENT     Roxanne progressed single leg loading. With hop forward she has valgus in the knee and stiff knee, needs to improve glut and HS strength. Patient with difficulty moving frontal plane due to inv/ev weakness. Fatigued end of session to gastroc/soleus       Roxanne Is progressing well towards her goals.   Pt prognosis is Good.     Pt will continue to benefit from skilled outpatient physical therapy to address the deficits listed in the problem list box on initial evaluation, provide pt/family education and to maximize pt's level of independence in the home and community environment.     Pt's spiritual, cultural and educational needs considered and pt agreeable to plan of care and goals.     Anticipated barriers to physical therapy: Work schedule     Goals:   Short Term Goals:  6 weeks (progressing, not met)  1.Report decreased ankle pain  < / =  2/10  to increase tolerance for ambulating in tennis shoes  2. Increase ROM to 5 degrees DF in order to walk with min to no compensation.  3. Increase strength by 1/3 MMT grade for ankle intrinsic  to increase tolerance for ADL and work activities.  4. Pt to tolerate HEP to improve ROM and independence with ADL's     Long Term Goals: 18 weeks (progressing, not met)   1.Report decreased ankle pain  < / =  0/10  to increase tolerance for return to work 12 hour shift pain free  2.Patient goal: return to working without ankle pain   3.Increase strength to 4+/5 for  Ankle PF/DF  to increase tolerance for ADL and work activities.  4. Pt will report at CJ level (20-40% impaired) on Modified FIM score for mobility to demonstrate increase in LE function and mobility in home and community environment.     PLAN   Plan of care Certification: 4/12/2022 to 10/12/2022.    Continue with current plan of care with emphasis  on ankle mobility, proximal strengthening, and gait mechanics.       Rick Manjarrez, PT

## 2022-09-01 ENCOUNTER — CLINICAL SUPPORT (OUTPATIENT)
Dept: REHABILITATION | Facility: HOSPITAL | Age: 32
End: 2022-09-01
Payer: COMMERCIAL

## 2022-09-01 ENCOUNTER — OFFICE VISIT (OUTPATIENT)
Dept: OBSTETRICS AND GYNECOLOGY | Facility: CLINIC | Age: 32
End: 2022-09-01
Payer: COMMERCIAL

## 2022-09-01 VITALS
HEIGHT: 66 IN | DIASTOLIC BLOOD PRESSURE: 70 MMHG | SYSTOLIC BLOOD PRESSURE: 110 MMHG | WEIGHT: 184.75 LBS | BODY MASS INDEX: 29.69 KG/M2

## 2022-09-01 DIAGNOSIS — Z11.51 SCREENING FOR HPV (HUMAN PAPILLOMAVIRUS): ICD-10-CM

## 2022-09-01 DIAGNOSIS — Z12.4 ENCOUNTER FOR PAPANICOLAOU SMEAR FOR CERVICAL CANCER SCREENING: ICD-10-CM

## 2022-09-01 DIAGNOSIS — Z01.419 WOMEN'S ANNUAL ROUTINE GYNECOLOGICAL EXAMINATION: Primary | ICD-10-CM

## 2022-09-01 DIAGNOSIS — M25.572 ACUTE LEFT ANKLE PAIN: Primary | ICD-10-CM

## 2022-09-01 PROCEDURE — 97110 THERAPEUTIC EXERCISES: CPT

## 2022-09-01 PROCEDURE — 88175 CYTOPATH C/V AUTO FLUID REDO: CPT

## 2022-09-01 PROCEDURE — 97112 NEUROMUSCULAR REEDUCATION: CPT

## 2022-09-01 PROCEDURE — 3008F BODY MASS INDEX DOCD: CPT | Mod: CPTII,S$GLB,,

## 2022-09-01 PROCEDURE — 3074F PR MOST RECENT SYSTOLIC BLOOD PRESSURE < 130 MM HG: ICD-10-PCS | Mod: CPTII,S$GLB,,

## 2022-09-01 PROCEDURE — 3074F SYST BP LT 130 MM HG: CPT | Mod: CPTII,S$GLB,,

## 2022-09-01 PROCEDURE — 99999 PR PBB SHADOW E&M-EST. PATIENT-LVL III: CPT | Mod: PBBFAC,,,

## 2022-09-01 PROCEDURE — 99999 PR PBB SHADOW E&M-EST. PATIENT-LVL III: ICD-10-PCS | Mod: PBBFAC,,,

## 2022-09-01 PROCEDURE — 3078F DIAST BP <80 MM HG: CPT | Mod: CPTII,S$GLB,,

## 2022-09-01 PROCEDURE — 1159F MED LIST DOCD IN RCRD: CPT | Mod: CPTII,S$GLB,,

## 2022-09-01 PROCEDURE — 99395 PREV VISIT EST AGE 18-39: CPT | Mod: S$GLB,,,

## 2022-09-01 PROCEDURE — 3008F PR BODY MASS INDEX (BMI) DOCUMENTED: ICD-10-PCS | Mod: CPTII,S$GLB,,

## 2022-09-01 PROCEDURE — 87624 HPV HI-RISK TYP POOLED RSLT: CPT

## 2022-09-01 PROCEDURE — 1159F PR MEDICATION LIST DOCUMENTED IN MEDICAL RECORD: ICD-10-PCS | Mod: CPTII,S$GLB,,

## 2022-09-01 PROCEDURE — 3078F PR MOST RECENT DIASTOLIC BLOOD PRESSURE < 80 MM HG: ICD-10-PCS | Mod: CPTII,S$GLB,,

## 2022-09-01 PROCEDURE — 99395 PR PREVENTIVE VISIT,EST,18-39: ICD-10-PCS | Mod: S$GLB,,,

## 2022-09-01 NOTE — PROGRESS NOTES
CC: Annual  HPI: Pt is a 32 y.o.  female who presents for routine annual exam. She uses OCP for contraception. She does not want STD screening.  Denies any GYN complaints. Requests pap at this visit. Plans to start trying to conceive next year. Reports taking daily prenatal vitamin. The patient participates in regular exercise: yes.  The patient does not smoke.  The patient wears seatbelts.   Pt denies any domestic violence.    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 Just graduated [employer] school and taking boards soon.      FH:  Breast cancer: none  Colon cancer: none  Ovarian cancer: grandmother (70's)  Endometrial cancer: none    Last pap: 5/26/202      ROS:  GENERAL: Feeling well overall. Denies fever or chills.   SKIN: Denies rash or lesions.   HEAD: Denies head injury or headache.   NODES: Denies enlarged lymph nodes.   CHEST: Denies chest pain or shortness of breath.   CARDIOVASCULAR: Denies palpitations or left sided chest pain.   ABDOMEN: No abdominal pain, constipation, diarrhea, nausea, vomiting or rectal bleeding.   URINARY: No dysuria, hematuria, or burning on urination.  REPRODUCTIVE: See HPI.   BREASTS: Denies pain, lumps, or nipple discharge.   HEMATOLOGIC: No easy bruisability or excessive bleeding.   MUSCULOSKELETAL: Denies joint pain or swelling.   NEUROLOGIC: Denies syncope or weakness.   PSYCHIATRIC: Denies depression, anxiety or mood swings.    PE:   APPEARANCE: Well nourished, well developed, White female in no acute distress.  NODES: no cervical, supraclavicular, or inguinal lymphadenopathy  BREASTS: Symmetrical, no skin changes or visible lesions. No palpable masses, nipple discharge or adenopathy bilaterally.  ABDOMEN: Soft. No tenderness or masses. No distention. No hernias palpated. No CVA tenderness.  VULVA: No lesions. Normal external female genitalia.  URETHRAL MEATUS: Normal size and location, no lesions, no prolapse.  URETHRA: No masses, tenderness, or prolapse.  VAGINA: Moist. No lesions or  lacerations noted. No abnormal discharge present. No odor present.   CERVIX: No lesions or discharge. No cervical motion tenderness.   UTERUS: Normal size, regular shape, mobile, non-tender.  ADNEXA: No tenderness. No fullness or masses palpated in the adnexal regions.   ANUS PERINEUM: Normal.      Diagnosis:  1. Women's annual routine gynecological examination    2. Encounter for Papanicolaou smear for cervical cancer screening    3. Screening for HPV (human papillomavirus)        Plan:     Orders Placed This Encounter    HPV High Risk Genotypes, PCR    Liquid-Based Pap Smear, Screening       Patient was counseled today on the new ACS guidelines for cervical cytology screening as well as the current recommendations for breast cancer screening. She was counseled to follow up with her PCP for other routine health maintenance. Counseling session lasted approximately 10 minutes, and all her questions were answered.    Follow-up with me in 1 year for routine exam       NIVIA Wyman

## 2022-09-01 NOTE — PROGRESS NOTES
"[facility]   Physical Therapy Treatment Note     Name: Roxanne Hardy  Clinic Number: [id number]    Therapy Diagnosis:   Encounter Diagnosis   Name Primary?    Acute left ankle pain Yes       Physician: Kaveh Bobo DO    Visit Date: 9/1/2022  Physician Orders: PT Eval and Treat   Medical Diagnosis from Referral: Z98.890 (ICD-10-CM) - Post-operative state  Evaluation Date: 4/12/2022  Authorization Period Expiration: 12/31/2022  Plan of Care Expiration: 10/12/2022  Visit # / Visits authorized: 25/25    PTA Visit #: 0/5     FOTO first follow up:   FOTO second follow up:     Time In: 200pm   Time Out: 300pm  Total Billable Time: 60 minutes    Precautions: Weightbearing      Date of Surgery: 3/3/2022    SUBJECTIVE     Pt reports: she felt sore in the hip after last session. Ankle has been feeling fine.      She was compliant with home exercise program.  Response to previous treatment: Improved gait tolerance  Functional change: Ambulating no assistive device    Pain: 0/10  Location: left ankle       OBJECTIVE   8/15/2022: Ankle AROM    DF: 8    PF: 50  Returned to wearing tennis shoes this week - decreased edema    Treatment     Roxanne received the treatments listed below:      therapeutic exercises to develop strength, endurance, ROM and flexibility for 30 minutes including:    Pt education on continuation with HEP, gait mechanics, and weight bearing status  Bridges with Heel raise 3 x 10   SL Hip Abd 2# 2 x 10   Seated Heel raise in neutral 2 x 15, 5sec holds 25# KB on knee  Seated Heel raises with tibia ER 3 x 10   Split squat into heel raise 2 x 10    NT   Bridge with sliders 2 x 15  Runners stretch at wall with heel raises 3x to fatigue B   Wall squat RLE elevated 4x to fatigue  Assisted heel raise with sport cord single leg 3 x 10 10"   [facility] split squat 3 x 15   Rebounder 2# med ball 2 directions 4 x 10  RDL with 6# med ball to cone tap 4 x 10  Standing Clams in mini squat on wall " "2 x 10 B    AlterG ambulatiion 90% BW  2.0 speed with 5% incline and gait feedback on device - 10'  Triple extension at wall 3 x 10  Ankle inv/ev BTB 2 x 10 ea  Sneaky lunge level 1 10x 10sec UE assist to get full height  Step up 8" with opp high knee controlled lowering 20x  Shuttle 2 feet up/ 1 down 4 x 8 4 1/2 bands      Manual therapy techniques: Joint mobilizations, Soft tissue Mobilization and scar mobs were applied to the: Left ankle for 5 minutes, including:    Talocrural anterior to posterior mobilizations in open packed position grade IV  TC P/A in open packed, grade 4  Subtalar mobilizations grade IV    Not today:  Metatarsal splaying   Scar mobilizations   Great toe mob Gr IV extension and IP joint  Gr V medial cuneiform AP-nt    Neuromuscular re-education activities to improve: Balance, Coordination, Kinesthetic and Sense for 20 minutes. The following activities were included:    Ankle rockerboard Inv/Ev 3' - NT  Rockerboard squats behind mat 3 x 10  SL RDL into plate press 2 x 10 10#  Lunges 2 x 5 B 10# KB  Squats 0-70 with GTB around knees 3 x 8       NT  Side lunge onto BOSU 2 x 15 mirror feedback  Heel tap 3" UE support 3 x 10  SL balance rebounder 30 throws  SL bridge dynadisc 4 x 10  Mod side plank clam 4 x 10 GTB  SL cone taps 15x  Forward/side lunge onto BOSU 2 x 15  TRX lunge position heel raise 2 x 10 3" hold max height  Runner pose soccer ball against the wall 3x to fatigue   SL bridge on dynadisc 2 x 15  SL balance paloff press GTB 2 x 15  Stagger stance 6# high low 2 x 15    therapeutic activities to improve functional performance for 0 minutes, including:    gait training to improve functional mobility and safety for 0 minutes, including:    Using bilateral crutches and utilizing CAM boot:  -Step-to gait pattern 4x15 feet   -Step-through gait pattern 6x30 feet       Patient Education and Home Exercises     Home Exercises Provided and Patient Education Provided     Education provided:   - " Continue with HEP  - CAM boot with step through gait pattern     Written Home Exercises Provided: Patient instructed to cont prior HEP. Exercises were reviewed and Roxanne was able to demonstrate them prior to the end of the session.  Roxanne demonstrated good  understanding of the education provided. See EMR under Patient Instructions for exercises provided during therapy sessions    ASSESSMENT     Roxanne progressed single leg loading. She tolerated today's treatment with no adverse effects. She would continue to benefit from motor control training to avoid femoral IR as well as peroneal strengthening. GTB and BTB provided for squats at home. She was educated to continue weighted heel raises at home to improve lower leg strength.     Roxanne Is progressing well towards her goals.   Pt prognosis is Good.     Pt will continue to benefit from skilled outpatient physical therapy to address the deficits listed in the problem list box on initial evaluation, provide pt/family education and to maximize pt's level of independence in the home and community environment.     Pt's spiritual, cultural and educational needs considered and pt agreeable to plan of care and goals.     Anticipated barriers to physical therapy: Work schedule     Goals:   Short Term Goals:  6 weeks (progressing, not met)  1.Report decreased ankle pain  < / =  2/10  to increase tolerance for ambulating in tennis shoes  2. Increase ROM to 5 degrees DF in order to walk with min to no compensation.  3. Increase strength by 1/3 MMT grade for ankle intrinsic  to increase tolerance for ADL and work activities.  4. Pt to tolerate HEP to improve ROM and independence with ADL's     Long Term Goals: 18 weeks (progressing, not met)   1.Report decreased ankle pain  < / =  0/10  to increase tolerance for return to work 12 hour shift pain free  2.Patient goal: return to working without ankle pain   3.Increase strength to 4+/5 for  Ankle PF/DF  to increase tolerance for ADL and  work activities.  4. Pt will report at CJ level (20-40% impaired) on Modified FIM score for mobility to demonstrate increase in LE function and mobility in home and community environment.     PLAN   Plan of care Certification: 4/12/2022 to 10/12/2022.    Continue with current plan of care with emphasis on ankle mobility, proximal strengthening, and gait mechanics.       Jose Juan Douglas, PT

## 2022-09-07 ENCOUNTER — CLINICAL SUPPORT (OUTPATIENT)
Dept: REHABILITATION | Facility: HOSPITAL | Age: 32
End: 2022-09-07
Payer: COMMERCIAL

## 2022-09-07 DIAGNOSIS — M25.572 ACUTE LEFT ANKLE PAIN: Primary | ICD-10-CM

## 2022-09-07 PROCEDURE — 97112 NEUROMUSCULAR REEDUCATION: CPT | Performed by: PHYSICAL THERAPIST

## 2022-09-08 NOTE — PROGRESS NOTES
"[facility]   Physical Therapy Treatment Note     Name: Roxanne Hardy  Clinic Number: [id number]    Therapy Diagnosis:   Encounter Diagnosis   Name Primary?    Acute left ankle pain Yes       Physician: Kaveh Bobo DO    Visit Date: 9/7/2022  Physician Orders: PT Eval and Treat   Medical Diagnosis from Referral: Z98.890 (ICD-10-CM) - Post-operative state  Evaluation Date: 4/12/2022  Authorization Period Expiration: 12/31/2022  Plan of Care Expiration: 10/12/2022  Visit # / Visits authorized: 26/25  **30 PT/OT/ST visit limit    PTA Visit #: 0/5     FOTO first follow up:   FOTO second follow up:     Time In: 1000am   Time Out: 1100am  Total Billable Time: 15 minutes    Precautions: Weightbearing      Date of Surgery: 3/3/2022    SUBJECTIVE     Pt reports: Ankle has been feeling pretty well. Still some anterior stiffness but walking better     She was compliant with home exercise program.  Response to previous treatment: Improved gait tolerance  Functional change: Ambulating no assistive device    Pain: 0/10  Location: left ankle       OBJECTIVE   9/7/22: Ankle AROM    DF: 10   PF: 50  Single leg heel raise 5x  Prone hamstring 4/5  Balance 12 seconds     Treatment     Roxanne received the treatments listed below:      therapeutic exercises to develop strength, endurance, ROM and flexibility for 30 minutes including:    Pt education on continuation with HEP, gait mechanics, and weight bearing status  Eccentric 2 up 1 down heel raises 2 x 15  Split squat 10# 2 x 15   Hip thrust DL 2 x 20    NT   Bridges with Heel raise 3 x 10   SL Hip Abd 2# 2 x 10   Seated Heel raise in neutral 2 x 15, 5sec holds 25# KB on knee  Seated Heel raises with tibia ER 3 x 10   Split squat into heel raise 2 x 10  Bridge with sliders 2 x 15  Runners stretch at wall with heel raises 3x to fatigue B   Wall squat RLE elevated 4x to fatigue  Assisted heel raise with sport cord single leg 3 x 10 10"   [place] split squat " "3 x 15   Rebounder 2# med ball 2 directions 4 x 10  RDL with 6# med ball to cone tap 4 x 10  Standing Clams in mini squat on wall 2 x 10 B    AlterG ambulatiion 90% BW  2.0 speed with 5% incline and gait feedback on device - 10'  Triple extension at wall 3 x 10  Ankle inv/ev BTB 2 x 10 ea  Sneaky lunge level 1 10x 10sec UE assist to get full height  Step up 8" with opp high knee controlled lowering 20x  Shuttle 2 feet up/ 1 down 4 x 8 4 1/2 bands      Manual therapy techniques: Joint mobilizations, Soft tissue Mobilization and scar mobs were applied to the: Left ankle for 9 minutes, including:    Talocrural anterior to posterior mobilizations in open packed position grade IV  Gr V talocrural distraction   Subtalar mobilizations grade IV    Not today:  TC P/A in open packed, grade 4  Metatarsal splaying   Scar mobilizations   Great toe mob Gr IV extension and IP joint  Gr V medial cuneiform AP-nt    Neuromuscular re-education activities to improve: Balance, Coordination, Kinesthetic and Sense for 20 minutes. The following activities were included:    SL [place] ropes 4x to fatigue alt styles   Mod side plank clam hold 2x fatigue B   RDL 8# med ball 3 x 15    NT  Ankle rockerboard Inv/Ev 3' - NT  Rockerboard squats behind mat 3 x 10  SL RDL into plate press 2 x 10 10#  Lunges 2 x 5 B 10# KB  Squats 0-70 with GTB around knees 3 x 8   Side lunge onto BOSU 2 x 15 mirror feedback  Heel tap 3" UE support 3 x 10  SL balance rebounder 30 throws  SL bridge dynadisc 4 x 10  Mod side plank clam 4 x 10 GTB  SL cone taps 15x  Forward/side lunge onto BOSU 2 x 15  TRX lunge position heel raise 2 x 10 3" hold max height  Runner pose soccer ball against the wall 3x to fatigue   SL bridge on dynadisc 2 x 15  SL balance paloff press GTB 2 x 15  Stagger stance 6# high low 2 x 15    therapeutic activities to improve functional performance for 0 minutes, including:    gait training to improve functional mobility and safety for 0 minutes, " including:    Using bilateral crutches and utilizing CAM boot:  -Step-to gait pattern 4x15 feet   -Step-through gait pattern 6x30 feet       Patient Education and Home Exercises     Home Exercises Provided and Patient Education Provided     Education provided:   - Continue with HEP  - CAM boot with step through gait pattern     Written Home Exercises Provided: Patient instructed to cont prior HEP. Exercises were reviewed and Roxanne was able to demonstrate them prior to the end of the session.  Roxanne demonstrated good  understanding of the education provided. See EMR under Patient Instructions for exercises provided during therapy sessions    ASSESSMENT     Roxanne notes her gait cycle felt improved after ankle mobs, eccentric lowering, and split squats. Continues to need to improve her gastroc/soleus strength for heel raise height and to normalize her gait cycle.      Roxanne Is progressing well towards her goals.   Pt prognosis is Good.     Pt will continue to benefit from skilled outpatient physical therapy to address the deficits listed in the problem list box on initial evaluation, provide pt/family education and to maximize pt's level of independence in the home and community environment.     Pt's spiritual, cultural and educational needs considered and pt agreeable to plan of care and goals.     Anticipated barriers to physical therapy: Work schedule     Goals:   Short Term Goals:  6 weeks (progressing, not met)  1.Report decreased ankle pain  < / =  2/10  to increase tolerance for ambulating in tennis shoes  2. Increase ROM to 5 degrees DF in order to walk with min to no compensation.  3. Increase strength by 1/3 MMT grade for ankle intrinsic  to increase tolerance for ADL and work activities.  4. Pt to tolerate HEP to improve ROM and independence with ADL's     Long Term Goals: 18 weeks (progressing, not met)   1.Report decreased ankle pain  < / =  0/10  to increase tolerance for return to work 12 hour shift pain  free  2.Patient goal: return to working without ankle pain   3.Increase strength to 4+/5 for  Ankle PF/DF  to increase tolerance for ADL and work activities.  4. Pt will report at CJ level (20-40% impaired) on Modified FIM score for mobility to demonstrate increase in LE function and mobility in home and community environment.     PLAN   Plan of care Certification: 4/12/2022 to 10/12/2022.    Continue with current plan of care with emphasis on ankle mobility, proximal strengthening, and gait mechanics.       Rick Manjarrez, PT

## 2022-09-09 LAB
CLINICAL INFO: NORMAL
CYTO CVX: NORMAL
CYTOLOGIST CVX/VAG CYTO: NORMAL
CYTOLOGIST CVX/VAG CYTO: NORMAL
CYTOLOGY CMNT CVX/VAG CYTO-IMP: NORMAL
CYTOLOGY PAP THIN PREP EXPLANATION: NORMAL
DATE OF PREVIOUS PAP: NO
DATE PREVIOUS BX: NO
GEN CATEG CVX/VAG CYTO-IMP: NORMAL
HPV I/H RISK 4 DNA CVX QL NAA+PROBE: NOT DETECTED
LMP START DATE: NORMAL
MICROORGANISM CVX/VAG CYTO: NORMAL
PATHOLOGIST CVX/VAG CYTO: NORMAL
SERVICE CMNT-IMP: NORMAL
SPECIMEN SOURCE CVX/VAG CYTO: NORMAL
STAT OF ADQ CVX/VAG CYTO-IMP: NORMAL

## 2022-09-16 ENCOUNTER — CLINICAL SUPPORT (OUTPATIENT)
Dept: REHABILITATION | Facility: HOSPITAL | Age: 32
End: 2022-09-16
Payer: COMMERCIAL

## 2022-09-16 DIAGNOSIS — M25.572 ACUTE LEFT ANKLE PAIN: Primary | ICD-10-CM

## 2022-09-16 PROCEDURE — 97140 MANUAL THERAPY 1/> REGIONS: CPT | Performed by: PHYSICAL THERAPIST

## 2022-09-16 PROCEDURE — 97110 THERAPEUTIC EXERCISES: CPT | Performed by: PHYSICAL THERAPIST

## 2022-09-16 PROCEDURE — 97112 NEUROMUSCULAR REEDUCATION: CPT | Performed by: PHYSICAL THERAPIST

## 2022-09-16 NOTE — PLAN OF CARE
[facility]   Physical Therapy Treatment Note     Name: Roxanne Hardy  Clinic Number: [id number]    Therapy Diagnosis:   Encounter Diagnosis   Name Primary?    Acute left ankle pain Yes       Physician: Kaveh Bobo DO    Visit Date: 9/16/2022  Physician Orders: PT Eval and Treat   Medical Diagnosis from Referral: Z98.890 (ICD-10-CM) - Post-operative state  Evaluation Date: 4/12/2022  Authorization Period Expiration: 12/31/2022  Plan of Care Expiration: 10/12/2022  Visit # / Visits authorized: 27/25  **30 PT/OT/ST visit limit    PTA Visit #: 0/5     FOTO first follow up:   FOTO second follow up:     Time In: 1000am   Time Out: 1100am  Total Billable Time: 60 minutes    Precautions: Weightbearing      Date of Surgery: 3/3/2022    SUBJECTIVE     Pt reports: I worked two shifts without ankle pain      She was compliant with home exercise program.  Response to previous treatment: Improved gait tolerance  Functional change: Ambulating no assistive device    Pain: 0/10  Location: left ankle       OBJECTIVE   9/16: Patient presents with increased stride length, limited toe off and ankle dorsiflexion with contralateral hip drop  Glute med 4-/5 on L, 4/5 on R  Ankle DF 4/5   PF strength 14 heel raises, 7 SL  Inv 22 deg, EV 6 deg  SL balance 18 seconds floor    Treatment     Roxanne received the treatments listed below:      therapeutic exercises to develop strength, endurance, ROM and flexibility for 35 minutes including:    Pt education on continuation with HEP, gait mechanics,  Eccentric 2 up 1 down heel raises with band assist 2 x 15  Lateral walks GTB across shoe laces 3 laps  SL RDL at CC 7# 2 x 10  Hip thrust DL 2 x 20  Hip abduction sliders 2 x 15 mirror feedback     NT   Bridges with Heel raise 3 x 10   SL Hip Abd 2# 2 x 10   Seated Heel raise in neutral 2 x 15, 5sec holds 25# KB on knee  Seated Heel raises with tibia ER 3 x 10   Split squat into heel raise 2 x 10  Bridge with sliders 2 x  "15  Runners stretch at wall with heel raises 3x to fatigue B   Wall squat RLE elevated 4x to fatigue  Assisted heel raise with sport cord single leg 3 x 10 10"       Manual therapy techniques: Joint mobilizations, Soft tissue Mobilization and scar mobs were applied to the: Left ankle for 11 minutes, including:    Talocrural anterior to posterior mobilizations in open packed position grade IV  Gr V talocrural distraction   Subtalar mobilizations grade IV  Great toe mob Gr IV extension and IP joint  Gr V medial cuneiform AP    Not today:  TC P/A in open packed, grade 4  Metatarsal splaying   Scar mobilizations       Neuromuscular re-education activities to improve: Balance, Coordination, Kinesthetic and Sense for 14 minutes. The following activities were included:    SL balance blue pad with 2# weight transfers side to side,  around the back 3x to fatigue each   Split squat on airex 5x to fatigue     NT  Ankle rockerboard Inv/Ev 3' - NT  Rockerboard squats behind mat 3 x 10  SL RDL into plate press 2 x 10 10#  Lunges 2 x 5 B 10# KB  Squats 0-70 with GTB around knees 3 x 8   Side lunge onto BOSU 2 x 15 mirror feedback    Stagger stance 6# high low 2 x 15    therapeutic activities to improve functional performance for 0 minutes, including:    gait training to improve functional mobility and safety for 0 minutes, including:    Using bilateral crutches and utilizing CAM boot:  -Step-to gait pattern 4x15 feet   -Step-through gait pattern 6x30 feet       Patient Education and Home Exercises     Home Exercises Provided and Patient Education Provided     Education provided:   - Continue with HEP  - CAM boot with step through gait pattern     Written Home Exercises Provided: Patient instructed to cont prior HEP. Exercises were reviewed and Roxanne was able to demonstrate them prior to the end of the session.  Roxanne demonstrated good  understanding of the education provided. See EMR under Patient Instructions for exercises provided " during therapy sessions    ASSESSMENT     Roxanne presented with limited talocrural DF and subtalar mobility, improved following manual mobilizations. Improved gait cycle after mob and gastroc/soleus activation. Glutes fatigue quickly, verbal cues even with balance to maintain level hips. Needs to continue improved posterior chain strength.     Roxanne Is progressing well towards her goals.   Pt prognosis is Good.     Pt will continue to benefit from skilled outpatient physical therapy to address the deficits listed in the problem list box on initial evaluation, provide pt/family education and to maximize pt's level of independence in the home and community environment.     Pt's spiritual, cultural and educational needs considered and pt agreeable to plan of care and goals.     Anticipated barriers to physical therapy: Work schedule     Goals:   Short Term Goals:  6 weeks (met)  1.Report decreased ankle pain  < / =  2/10  to increase tolerance for ambulating in tennis shoesmet)  2. Increase ROM to 5 degrees DF in order to walk with min to no compensation.met)  3. Increase strength by 1/3 MMT grade for ankle intrinsic  to increase tolerance for ADL and work activities.met)  4. Pt to tolerate HEP to improve ROM and independence with ADL'smet)     Long Term Goals: 18 weeks (progressing, not met)   1.Report decreased ankle pain  < / =  0/10  to increase tolerance for return to work 12 hour shift pain free (Progressing, not met)  2.Patient goal: return to working without ankle pain met)  3.Increase strength to 4+/5 for  Ankle PF/DF  to increase tolerance for ADL and work activities.(Progressing, not met)  4. Pt will report at CJ level (20-40% impaired) on Modified FIM score for mobility to demonstrate increase in LE function and mobility in home and community environment. (Progressing, not met)    PLAN   Plan of care Certification: 4/12/2022 to 10/12/2022.    Continue with current plan of care with emphasis on ankle mobility,  posterior chain strengthening strengthening, and gait mechanics.       Rick Manjarrez, PT

## 2022-09-16 NOTE — PROGRESS NOTES
[facility]   Physical Therapy Treatment Note     Name: Roxanne Hardy  Clinic Number: [id number]    Therapy Diagnosis:   Encounter Diagnosis   Name Primary?    Acute left ankle pain Yes       Physician: Kaveh Bobo DO    Visit Date: 9/16/2022  Physician Orders: PT Eval and Treat   Medical Diagnosis from Referral: Z98.890 (ICD-10-CM) - Post-operative state  Evaluation Date: 4/12/2022  Authorization Period Expiration: 12/31/2022  Plan of Care Expiration: 10/12/2022  Visit # / Visits authorized: 27/25  **30 PT/OT/ST visit limit    PTA Visit #: 0/5     FOTO first follow up:   FOTO second follow up:     Time In: 1000am   Time Out: 1100am  Total Billable Time: 60 minutes    Precautions: Weightbearing      Date of Surgery: 3/3/2022    SUBJECTIVE     Pt reports: I worked two shifts without ankle pain      She was compliant with home exercise program.  Response to previous treatment: Improved gait tolerance  Functional change: Ambulating no assistive device    Pain: 0/10  Location: left ankle       OBJECTIVE   9/16: Patient presents with increased stride length, limited toe off and ankle dorsiflexion with contralateral hip drop  Glute med 4-/5 on L, 4/5 on R  Ankle DF 4/5   PF strength 14 heel raises, 7 SL  Inv 22 deg, EV 6 deg  SL balance 18 seconds floor    Treatment     Roxanne received the treatments listed below:      therapeutic exercises to develop strength, endurance, ROM and flexibility for 35 minutes including:    Pt education on continuation with HEP, gait mechanics,  Eccentric 2 up 1 down heel raises with band assist 2 x 15  Lateral walks GTB across shoe laces 3 laps  SL RDL at CC 7# 2 x 10  Hip thrust DL 2 x 20  Hip abduction sliders 2 x 15 mirror feedback     NT   Bridges with Heel raise 3 x 10   SL Hip Abd 2# 2 x 10   Seated Heel raise in neutral 2 x 15, 5sec holds 25# KB on knee  Seated Heel raises with tibia ER 3 x 10   Split squat into heel raise 2 x 10  Bridge with sliders 2 x  "15  Runners stretch at wall with heel raises 3x to fatigue B   Wall squat RLE elevated 4x to fatigue  Assisted heel raise with sport cord single leg 3 x 10 10"       Manual therapy techniques: Joint mobilizations, Soft tissue Mobilization and scar mobs were applied to the: Left ankle for 11 minutes, including:    Talocrural anterior to posterior mobilizations in open packed position grade IV  Gr V talocrural distraction   Subtalar mobilizations grade IV  Great toe mob Gr IV extension and IP joint  Gr V medial cuneiform AP    Not today:  TC P/A in open packed, grade 4  Metatarsal splaying   Scar mobilizations       Neuromuscular re-education activities to improve: Balance, Coordination, Kinesthetic and Sense for 14 minutes. The following activities were included:    SL balance blue pad with 2# weight transfers side to side,  around the back 3x to fatigue each   Split squat on airex 5x to fatigue     NT  Ankle rockerboard Inv/Ev 3' - NT  Rockerboard squats behind mat 3 x 10  SL RDL into plate press 2 x 10 10#  Lunges 2 x 5 B 10# KB  Squats 0-70 with GTB around knees 3 x 8   Side lunge onto BOSU 2 x 15 mirror feedback    Stagger stance 6# high low 2 x 15    therapeutic activities to improve functional performance for 0 minutes, including:    gait training to improve functional mobility and safety for 0 minutes, including:    Using bilateral crutches and utilizing CAM boot:  -Step-to gait pattern 4x15 feet   -Step-through gait pattern 6x30 feet       Patient Education and Home Exercises     Home Exercises Provided and Patient Education Provided     Education provided:   - Continue with HEP  - CAM boot with step through gait pattern     Written Home Exercises Provided: Patient instructed to cont prior HEP. Exercises were reviewed and Roxanne was able to demonstrate them prior to the end of the session.  Roxanne demonstrated good  understanding of the education provided. See EMR under Patient Instructions for exercises provided " during therapy sessions    ASSESSMENT     Roxanne presented with limited talocrural DF and subtalar mobility, improved following manual mobilizations. Improved gait cycle after mob and gastroc/soleus activation. Glutes fatigue quickly, verbal cues even with balance to maintain level hips. Needs to continue improved posterior chain strength.     Roxanne Is progressing well towards her goals.   Pt prognosis is Good.     Pt will continue to benefit from skilled outpatient physical therapy to address the deficits listed in the problem list box on initial evaluation, provide pt/family education and to maximize pt's level of independence in the home and community environment.     Pt's spiritual, cultural and educational needs considered and pt agreeable to plan of care and goals.     Anticipated barriers to physical therapy: Work schedule     Goals:   Short Term Goals:  6 weeks (met)  1.Report decreased ankle pain  < / =  2/10  to increase tolerance for ambulating in tennis shoesmet)  2. Increase ROM to 5 degrees DF in order to walk with min to no compensation.met)  3. Increase strength by 1/3 MMT grade for ankle intrinsic  to increase tolerance for ADL and work activities.met)  4. Pt to tolerate HEP to improve ROM and independence with ADL'smet)     Long Term Goals: 18 weeks (progressing, not met)   1.Report decreased ankle pain  < / =  0/10  to increase tolerance for return to work 12 hour shift pain free (Progressing, not met)  2.Patient goal: return to working without ankle pain met)  3.Increase strength to 4+/5 for  Ankle PF/DF  to increase tolerance for ADL and work activities.(Progressing, not met)  4. Pt will report at CJ level (20-40% impaired) on Modified FIM score for mobility to demonstrate increase in LE function and mobility in home and community environment. (Progressing, not met)    PLAN   Plan of care Certification: 4/12/2022 to 10/12/2022.    Continue with current plan of care with emphasis on ankle mobility,  posterior chain strengthening strengthening, and gait mechanics.       Rick Manjarrez, PT

## 2022-09-23 ENCOUNTER — OFFICE VISIT (OUTPATIENT)
Dept: GASTROENTEROLOGY | Facility: CLINIC | Age: 32
End: 2022-09-23
Payer: COMMERCIAL

## 2022-09-23 DIAGNOSIS — R19.5 LOOSE STOOLS: ICD-10-CM

## 2022-09-23 DIAGNOSIS — K92.1 BLOOD IN STOOL: Primary | ICD-10-CM

## 2022-09-23 DIAGNOSIS — K92.1 HEMATOCHEZIA: ICD-10-CM

## 2022-09-23 DIAGNOSIS — K59.09 CONSTIPATION, CHRONIC: ICD-10-CM

## 2022-09-23 PROCEDURE — 99212 PR OFFICE/OUTPT VISIT, EST, LEVL II, 10-19 MIN: ICD-10-PCS | Mod: 95,,, | Performed by: INTERNAL MEDICINE

## 2022-09-23 PROCEDURE — 99212 OFFICE O/P EST SF 10 MIN: CPT | Mod: 95,,, | Performed by: INTERNAL MEDICINE

## 2022-09-23 NOTE — PROGRESS NOTES
The patient location is: In her room  The chief complaint leading to consultation is:  Painless hematochezia    Visit type: audiovisual    Face to Face time with patient: 10   minutes of total time spent on the encounter, which includes face to face time and non-face to face time preparing to see the patient (eg, review of tests), Obtaining and/or reviewing separately obtained history, Documenting clinical information in the electronic or other health record, Independently interpreting results (not separately reported) and communicating results to the patient/family/caregiver, or Care coordination (not separately reported).         Each patient to whom he or she provides medical services by telemedicine is:  (1) informed of the relationship between the physician and patient and the respective role of any other health care provider with respect to management of the patient; and (2) notified that he or she may decline to receive medical services by telemedicine and may withdraw from such care at any time.    Notes:           [facility] Consultation Note    Reason for Consult:  The primary encounter diagnosis was Blood in stool. Diagnoses of Hematochezia, Constipation, chronic, and Loose stools were also pertinent to this visit.    PCP:   Timoteo Lund   No address on file    Referring MD:  Aaareferral Self  No address on file    Initial History of Present Illness (HPI):  This is a 32 y.o. female here for evaluation of painless blood in her stool.  She was seen about 2 years ago but never did her EGD colonoscopy or stool studies she had several things came up that prevented including some fractures that she did want to take a bowel prep.  Patient also has a history of which she thinks could be IBS constipation and diarrhea she has 50/50 other constipation and diarrhea she takes no medication for diarrhea or constipation.  She occasionally gets of left mid abdominal cramps.  She has no fever no  chills no shortness of breath she has been vaccinated for COVID-19.  She works in the NICU as an RN she has never had an EGD never had a colonoscopy does not think she did stool studies before.  She says maybe in high school or college she was slightly anemic she is on birth control pills has mild menstrual periods currently no history of blood transfusion.  She does have low vitamin-D she has been taking her vitamin-D supplement.     Abdominal pain - as above  Reflux - no  Dysphagia - no   Bowel habits -as above  GI bleeding - painless hematochezia  NSAID usage - none     Interval HPI 03/02/2021:  The patient's last visit with me was on Visit date not found.        ROS:  Constitutional: No fevers, chills, No weight loss  ENT:  No heartburn no dysphagia no odynophagia no hoarseness  CV: No chest pain, no palpitation  Pulm: No cough, No shortness of breath, no wheezing  Ophtho: No vision changes  GI: see HPI  Derm: No rash, no itching  Heme: No lymphadenopathy, No easy bruising  MSK:  Orthopedic injuries and fractures  : No dysuria, No hematuria  Endo: No hot or cold intolerance  Neuro: No syncope, No seizure, no strokes  Psych: No uncontrolled anxiety, No uncontrolled depression     Interval HPI 09/23/2022:  The patient's last visit with me was on 3/2/2021.      Medical History:  has a past medical history of Bee sting allergy and Chronic idiopathic urticaria.    Surgical History:  has a past surgical history that includes Reduction of both breasts (Bilateral, 11/01/2018); Open reduction and internal fixation (ORIF) of injury of wrist (Right, 03/22/2021); and Open reduction and internal fixation (ORIF) of injury of ankle (03/03/2022).    Family History: family history includes BRCA 1/2 in her mother; Diabetes in her paternal grandmother; Hyperlipidemia in her father and mother; Hypertension in her father; Hypothyroidism in her mother; Ovarian cancer (age of onset: 70) in her maternal grandmother; Pancreatic cancer  (age of onset: 83) in her paternal grandfather; Prostate cancer in her paternal grandfather; Skin cancer in her father and paternal grandmother..     Social History:  reports that she has never smoked. She has never used smokeless tobacco. She reports current alcohol use of about 4.0 standard drinks per week. She reports that she does not use drugs.    Review of patient's allergies indicates:   Allergen Reactions    Bee sting [allergen ext-venom-honey bee] Anaphylaxis       Medication List with Changes/Refills   Current Medications    CHOLECALCIFEROL, VITAMIN D3, 125 MCG (5,000 UNIT) TAB    Take 5,000 Units by mouth once daily.    EPINEPHRINE (EPIPEN 2-BUCK) 0.3 MG/0.3 ML ATIN    Inject 0.3 mLs (0.3 mg total) into the muscle once as needed (anaphylaxis).    NORETHINDRONE-ETHINYL ESTRADIOL (JUNEL 1/20, 21,) 1-20 MG-MCG PER TABLET    Take 1 tablet by mouth once daily.         Objective Findings:    Vital Signs:  [facility] 08/26/2022   There is no height or weight on file to calculate BMI.    Physical Exam:  Telemedicine video visit  General Appearance: Well appearing in no acute distress  Neurologic:  Alert and oriented x4  Psychiatric:  Normal speech mentation and affect    Labs:  Lab Results   Component Value Date    WBC 8.54 03/03/2021    HGB 15.4 03/03/2021    HCT 46.5 03/03/2021     03/03/2021    CHOL 148 03/26/2018    TRIG 83 03/26/2018    HDL 69 03/26/2018    ALT 19 03/03/2021    AST 21 03/03/2021     03/03/2021    K 3.9 03/03/2021     03/03/2021    CREATININE 0.7 03/03/2021    BUN 12 03/03/2021    CO2 26 03/03/2021    TSH 1.479 03/03/2021             Medical Decision Making:  Labs review  Stool study talk given  Fasting lab talk given  EGD colonoscopy talk given      Assessment:  1. Blood in stool    2. Hematochezia    3. Constipation, chronic    4. Loose stools    5.    Low vitamin-D     Recommendations:  1. Lab work  2. Stool studies  3. EGD colonoscopy  4. Patient is taking vitamin-D supplements  currently  5. Return GI clinic 3 months for follow-up    No follow-ups on file.      Order summary:  Orders Placed This Encounter    Clostridium difficile EIA    Stool culture    CBC Auto Differential    Iron and TIBC    Comprehensive Metabolic Panel    TISSUE TRANSGLUTAMINASE (TTG), IGA    IgA    Ferritin    Vitamin D    Stool Exam-Ova,Cysts,Parasites    Giardia / Cryptosporidum, EIA    Fecal fat, qualitative    Pancreatic elastase, fecal    Cyclospora    Micropsoridia species, PCR    Ambulatory referral/consult to Endo Procedure          Thank you so much for allowing me to participate in the care of Roxanne Miller MD

## 2022-09-23 NOTE — Clinical Note
Frandy please schedule patient for 12 hour fasting blood work and stool studies please help schedule her EGD colonoscopy orders for referral placed.  Return GI clinic 3 months for follow-up okay for telemedicine video visit

## 2022-09-26 ENCOUNTER — PATIENT MESSAGE (OUTPATIENT)
Dept: GASTROENTEROLOGY | Facility: CLINIC | Age: 32
End: 2022-09-26
Payer: COMMERCIAL

## 2022-09-27 ENCOUNTER — CLINICAL SUPPORT (OUTPATIENT)
Dept: REHABILITATION | Facility: HOSPITAL | Age: 32
End: 2022-09-27
Payer: COMMERCIAL

## 2022-09-27 DIAGNOSIS — M25.572 ACUTE LEFT ANKLE PAIN: Primary | ICD-10-CM

## 2022-09-27 PROCEDURE — 97112 NEUROMUSCULAR REEDUCATION: CPT | Performed by: PHYSICAL THERAPIST

## 2022-09-27 PROCEDURE — 97110 THERAPEUTIC EXERCISES: CPT | Performed by: PHYSICAL THERAPIST

## 2022-09-27 PROCEDURE — 97140 MANUAL THERAPY 1/> REGIONS: CPT | Performed by: PHYSICAL THERAPIST

## 2022-09-27 NOTE — PROGRESS NOTES
[facility]   Physical Therapy Treatment Note     Name: Roxanne Hardy  Clinic Number: [id number]    Therapy Diagnosis:   Encounter Diagnosis   Name Primary?    Acute left ankle pain Yes       Physician: Kaveh Bobo DO    Visit Date: 9/27/2022  Physician Orders: PT Eval and Treat   Medical Diagnosis from Referral: Z98.890 (ICD-10-CM) - Post-operative state  Evaluation Date: 4/12/2022  Authorization Period Expiration: 12/31/2022  Plan of Care Expiration: 10/12/2022  Visit # / Visits authorized: 28/30  **30 PT/OT/ST visit limit    PTA Visit #: 0/5     FOTO first follow up:   FOTO second follow up:     Time In: 100Pm   Time Out: 200Pm  Total Billable Time: 60 minutes    Precautions: Weightbearing      Date of Surgery: 3/3/2022    SUBJECTIVE     Pt [place].      She was compliant with home exercise program.  Response to previous treatment: Improved gait tolerance  Functional change: Ambulating no assistive device    Pain: 0/10  Location: left ankle       OBJECTIVE     Limited scar mobility  Hamstrings 4-/5 bilaterally  Glute med 4/5  Inv/ev 4/5    Treatment     Roxanne received the treatments listed below:      therapeutic exercises to develop strength, endurance, ROM and flexibility for 19 minutes including:    Pt education on continuation with HEP, gait mechanics,  Lateral walk YTB ankles and feet for ankle eversion 3 laps turf  Swiss ball roll outs 3 x 15      NT   Eccentric 2 up 1 down heel raises with band assist 2 x 15  Lateral walks GTB across shoe laces 3 laps  SL RDL at CC 7# 2 x 10  Hip thrust DL 2 x 20  Hip abduction sliders 2 x 15 mirror feedback   Bridges with Heel raise 3 x 10   SL Hip Abd 2# 2 x 10         Manual therapy techniques: Joint mobilizations, Soft tissue Mobilization and scar mobs were applied to the: Left ankle for 13 minutes, including:    Talocrural anterior to posterior mobilizations in open packed position grade  "IV  Subtalar mobilizations grade IV  Great toe mob Gr IV extension and IP joint    Not today:  Gr V medial cuneiform AP  TC P/A in open packed, grade 4  Metatarsal splaying   Scar mobilizations       Neuromuscular re-education activities to improve: Balance, Coordination, Kinesthetic and Sense for 28 minutes. The following activities were included:    Forward lunge onto BOSU 2 x 15 on R  Sneaky lunge iso holds 2 x 10 5"  Deadlift 26.4# KB 2 x 15    NT  SL balance blue pad with 2# weight transfers side to side,  around the back 3x to fatigue each   Split squat on airex 5x to fatigue   Ankle rockerboard Inv/Ev 3' - NT  Rockerboard squats behind mat 3 x 10  SL RDL into plate press 2 x 10 10#  Lunges 2 x 5 B 10# KB  Squats 0-70 with GTB around knees 3 x 8   Side lunge onto BOSU 2 x 15 mirror feedback    Stagger stance 6# high low 2 x 15    therapeutic activities to improve functional performance for 0 minutes, including:    gait training to improve functional mobility and safety for 0 minutes, including:    Using bilateral crutches and utilizing CAM boot:  -Step-to gait pattern 4x15 feet   -Step-through gait pattern 6x30 feet       Patient Education and Home Exercises     Home Exercises Provided and Patient Education Provided     Education provided:   - Continue with HEP  - CAM boot with step through gait pattern     Written Home Exercises Provided: Patient instructed to cont prior HEP. Exercises were reviewed and Roxanne was able to demonstrate them prior to the end of the session.  Roxanne demonstrated good  understanding of the education provided. See EMR under Patient Instructions for exercises provided during therapy sessions    ASSESSMENT     Roxanne presented with improved strength to the GSS. Bilaterally limited to hamstring strength but progressing GSS strengthening. Lateral walks with feet straight for everter strengthening    Roxanne Is progressing well towards her goals.   Pt prognosis is Good.     Pt will continue to " benefit from skilled outpatient physical therapy to address the deficits listed in the problem list box on initial evaluation, provide pt/family education and to maximize pt's level of independence in the home and community environment.     Pt's spiritual, cultural and educational needs considered and pt agreeable to plan of care and goals.     Anticipated barriers to physical therapy: Work schedule     Goals:   Short Term Goals:  6 weeks (met)  1.Report decreased ankle pain  < / =  2/10  to increase tolerance for ambulating in tennis shoesmet)  2. Increase ROM to 5 degrees DF in order to walk with min to no compensation.met)  3. Increase strength by 1/3 MMT grade for ankle intrinsic  to increase tolerance for ADL and work activities.met)  4. Pt to tolerate HEP to improve ROM and independence with ADL'smet)     Long Term Goals: 18 weeks (progressing, not met)   1.Report decreased ankle pain  < / =  0/10  to increase tolerance for return to work 12 hour shift pain free (Progressing, not met)  2.Patient goal: return to working without ankle pain met)  3.Increase strength to 4+/5 for  Ankle PF/DF  to increase tolerance for ADL and work activities.(Progressing, not met)  4. Pt will report at CJ level (20-40% impaired) on Modified FIM score for mobility to demonstrate increase in LE function and mobility in home and community environment. (Progressing, not met)    PLAN   Plan of care Certification: 4/12/2022 to 10/12/2022.    Continue with current plan of care with emphasis on ankle mobility, posterior chain strengthening strengthening, and gait mechanics.       Rick Manjarrez, PT

## 2022-09-28 ENCOUNTER — LAB VISIT (OUTPATIENT)
Dept: LAB | Facility: OTHER | Age: 32
End: 2022-09-28
Attending: INTERNAL MEDICINE
Payer: COMMERCIAL

## 2022-09-28 DIAGNOSIS — K92.1 HEMATOCHEZIA: ICD-10-CM

## 2022-09-28 DIAGNOSIS — K92.1 BLOOD IN STOOL: ICD-10-CM

## 2022-09-28 DIAGNOSIS — R19.5 LOOSE STOOLS: ICD-10-CM

## 2022-09-28 DIAGNOSIS — K59.09 CONSTIPATION, CHRONIC: ICD-10-CM

## 2022-09-28 LAB
25(OH)D3+25(OH)D2 SERPL-MCNC: 33 NG/ML (ref 30–96)
ALBUMIN SERPL BCP-MCNC: 2.9 G/DL (ref 3.5–5.2)
ALP SERPL-CCNC: 80 U/L (ref 55–135)
ALT SERPL W/O P-5'-P-CCNC: 20 U/L (ref 10–44)
ANION GAP SERPL CALC-SCNC: 6 MMOL/L (ref 8–16)
AST SERPL-CCNC: 19 U/L (ref 10–40)
BASOPHILS # BLD AUTO: 0.06 K/UL (ref 0–0.2)
BASOPHILS NFR BLD: 0.6 % (ref 0–1.9)
BILIRUB SERPL-MCNC: 0.3 MG/DL (ref 0.1–1)
BUN SERPL-MCNC: 8 MG/DL (ref 6–20)
CALCIUM SERPL-MCNC: 8.8 MG/DL (ref 8.7–10.5)
CHLORIDE SERPL-SCNC: 107 MMOL/L (ref 95–110)
CO2 SERPL-SCNC: 26 MMOL/L (ref 23–29)
CREAT SERPL-MCNC: 0.7 MG/DL (ref 0.5–1.4)
DIFFERENTIAL METHOD: ABNORMAL
EOSINOPHIL # BLD AUTO: 0.6 K/UL (ref 0–0.5)
EOSINOPHIL NFR BLD: 6.4 % (ref 0–8)
ERYTHROCYTE [DISTWIDTH] IN BLOOD BY AUTOMATED COUNT: 13.5 % (ref 11.5–14.5)
EST. GFR  (NO RACE VARIABLE): >60 ML/MIN/1.73 M^2
FERRITIN SERPL-MCNC: 7 NG/ML (ref 20–300)
GLUCOSE SERPL-MCNC: 104 MG/DL (ref 70–110)
HCT VFR BLD AUTO: 37.2 % (ref 37–48.5)
HGB BLD-MCNC: 11.9 G/DL (ref 12–16)
IGA SERPL-MCNC: 359 MG/DL (ref 40–350)
IMM GRANULOCYTES # BLD AUTO: 0.05 K/UL (ref 0–0.04)
IMM GRANULOCYTES NFR BLD AUTO: 0.5 % (ref 0–0.5)
IRON SERPL-MCNC: 19 UG/DL (ref 30–160)
LYMPHOCYTES # BLD AUTO: 2.5 K/UL (ref 1–4.8)
LYMPHOCYTES NFR BLD: 26.6 % (ref 18–48)
MCH RBC QN AUTO: 26.2 PG (ref 27–31)
MCHC RBC AUTO-ENTMCNC: 32 G/DL (ref 32–36)
MCV RBC AUTO: 82 FL (ref 82–98)
MONOCYTES # BLD AUTO: 0.7 K/UL (ref 0.3–1)
MONOCYTES NFR BLD: 7.7 % (ref 4–15)
NEUTROPHILS # BLD AUTO: 5.5 K/UL (ref 1.8–7.7)
NEUTROPHILS NFR BLD: 58.2 % (ref 38–73)
NRBC BLD-RTO: 0 /100 WBC
PLATELET # BLD AUTO: 383 K/UL (ref 150–450)
PMV BLD AUTO: 8.6 FL (ref 9.2–12.9)
POTASSIUM SERPL-SCNC: 3.9 MMOL/L (ref 3.5–5.1)
PROT SERPL-MCNC: 6.5 G/DL (ref 6–8.4)
RBC # BLD AUTO: 4.54 M/UL (ref 4–5.4)
SATURATED IRON: 4 % (ref 20–50)
SODIUM SERPL-SCNC: 139 MMOL/L (ref 136–145)
TOTAL IRON BINDING CAPACITY: 440 UG/DL (ref 250–450)
TRANSFERRIN SERPL-MCNC: 297 MG/DL (ref 200–375)
WBC # BLD AUTO: 9.43 K/UL (ref 3.9–12.7)

## 2022-09-28 PROCEDURE — 82784 ASSAY IGA/IGD/IGG/IGM EACH: CPT | Performed by: INTERNAL MEDICINE

## 2022-09-28 PROCEDURE — 84466 ASSAY OF TRANSFERRIN: CPT | Performed by: INTERNAL MEDICINE

## 2022-09-28 PROCEDURE — 83516 IMMUNOASSAY NONANTIBODY: CPT | Performed by: INTERNAL MEDICINE

## 2022-09-28 PROCEDURE — 82728 ASSAY OF FERRITIN: CPT | Performed by: INTERNAL MEDICINE

## 2022-09-28 PROCEDURE — 82306 VITAMIN D 25 HYDROXY: CPT | Performed by: INTERNAL MEDICINE

## 2022-09-28 PROCEDURE — 85025 COMPLETE CBC W/AUTO DIFF WBC: CPT | Performed by: INTERNAL MEDICINE

## 2022-09-28 PROCEDURE — 36415 COLL VENOUS BLD VENIPUNCTURE: CPT | Performed by: INTERNAL MEDICINE

## 2022-09-28 PROCEDURE — 80053 COMPREHEN METABOLIC PANEL: CPT | Performed by: INTERNAL MEDICINE

## 2022-09-30 LAB — TTG IGA SER-ACNC: 7 UNITS

## 2022-10-09 DIAGNOSIS — D50.9 IRON DEFICIENCY ANEMIA, UNSPECIFIED IRON DEFICIENCY ANEMIA TYPE: ICD-10-CM

## 2022-10-09 DIAGNOSIS — R77.0 LOW SERUM ALBUMIN: Primary | ICD-10-CM

## 2022-10-09 RX ORDER — FERROUS GLUCONATE 324(38)MG
324 TABLET ORAL
Qty: 90 TABLET | Refills: 1 | Status: SHIPPED | OUTPATIENT
Start: 2022-10-09 | End: 2023-08-30 | Stop reason: SDUPTHER

## 2022-10-09 NOTE — PROGRESS NOTES
Frandy - please tell patient that they are iron deficient and anemic and recommend that they take ferrous gluconate one 324mg pill once daily for next 3 months.    Please order repeat fasting CMP, Hemoglobin, Iron/TIBC, and Ferritin in 4 weeks - Orders placed.

## 2022-10-11 ENCOUNTER — CLINICAL SUPPORT (OUTPATIENT)
Dept: REHABILITATION | Facility: HOSPITAL | Age: 32
End: 2022-10-11
Payer: COMMERCIAL

## 2022-10-11 DIAGNOSIS — M25.572 ACUTE LEFT ANKLE PAIN: Primary | ICD-10-CM

## 2022-10-11 PROCEDURE — 97110 THERAPEUTIC EXERCISES: CPT | Performed by: PHYSICAL THERAPIST

## 2022-10-11 PROCEDURE — 97140 MANUAL THERAPY 1/> REGIONS: CPT | Performed by: PHYSICAL THERAPIST

## 2022-10-11 NOTE — PROGRESS NOTES
[facility]   Physical Therapy Treatment Note     Name: Roxanne Hardy  Clinic Number: [id number]    Therapy Diagnosis:   Encounter Diagnosis   Name Primary?    Acute left ankle pain Yes       Physician: Kaveh Bobo DO    Visit Date: 10/11/2022  Physician Orders: PT Eval and Treat   Medical Diagnosis from Referral: Z98.890 (ICD-10-CM) - Post-operative state  Evaluation Date: 4/12/2022  Authorization Period Expiration: 12/31/2022  Plan of Care Expiration: 10/12/2022  Visit # / Visits authorized: 29/30  **30 PT/OT/ST visit limit    PTA Visit #: 0/5     FOTO first follow up:   FOTO second follow up:     Time In: 100Pm   Time Out: 200Pm  Total Billable Time: 60 minutes    Precautions: Weightbearing      Date of Surgery: 3/3/2022    SUBJECTIVE     Pt reports: I went to a wedding in [place] and people noted I wasn't limping. Notes no pain day to day anymore     She was compliant with home exercise program.  Response to previous treatment: Improved gait tolerance  Functional change: Ambulating no assistive device    Pain: 0/10  Location: left ankle       OBJECTIVE     Limited scar mobility  Hamstrings 4-/5 bilaterally  Glute med 4/5  Inv/ev 4/5    Treatment     Roxanne received the treatments listed below:      therapeutic exercises to develop strength, endurance, ROM and flexibility for 50 minutes including:    Pt education on continuation with HEP, gait mechanics,  Lateral walk YTB ankles and feet for ankle eversion 3 laps turf  Swiss bal bridge with curl 2 x 15  -super set knees bent bridges 2 x 15  Heel raises SL 60# 3 x 15  Soleus DL heel raise 100# 3 x 15    NT   Eccentric 2 up 1 down heel raises with band assist 2 x 15  Lateral walks GTB across shoe laces 3 laps  SL RDL at CC 7# 2 x 10  Hip thrust DL 2 x 20  Hip abduction sliders 2 x 15 mirror feedback   Bridges with Heel raise 3 x 10   SL Hip Abd 2# 2 x 10         Manual therapy techniques: Joint mobilizations, Soft tissue Mobilization and  "scar mobs were applied to the: Left ankle for 10 minutes, including:    Talocrural anterior to posterior mobilizations in open packed position grade IV  Subtalar mobilizations grade IV  Great toe mob Gr IV extension and IP joint    Not today:  Gr V medial cuneiform AP  TC P/A in open packed, grade 4  Metatarsal splaying   Scar mobilizations       Neuromuscular re-education activities to improve: Balance, Coordination, Kinesthetic and Sense for 0 minutes. The following activities were included:    Forward lunge onto BOSU 2 x 15 on R  Sneaky lunge iso holds 2 x 10 5"  Deadlift 26.4# KB 2 x 15    NT  SL balance blue pad with 2# weight transfers side to side,  around the back 3x to fatigue each   Split squat on airex 5x to fatigue   Ankle rockerboard Inv/Ev 3' - NT  Rockerboard squats behind mat 3 x 10  SL RDL into plate press 2 x 10 10#  Lunges 2 x 5 B 10# KB  Squats 0-70 with GTB around knees 3 x 8   Side lunge onto BOSU 2 x 15 mirror feedback    Stagger stance 6# high low 2 x 15    therapeutic activities to improve functional performance for 0 minutes, including:    gait training to improve functional mobility and safety for 0 minutes, including:    Using bilateral crutches and utilizing CAM boot:  -Step-to gait pattern 4x15 feet   -Step-through gait pattern 6x30 feet       Patient Education and Home Exercises     Home Exercises Provided and Patient Education Provided     Education provided:   - Continue with HEP  - CAM boot with step through gait pattern     Written Home Exercises Provided: Patient instructed to cont prior HEP. Exercises were reviewed and Roxanne was able to demonstrate them prior to the end of the session.  Roxanne demonstrated good  understanding of the education provided. See EMR under Patient Instructions for exercises provided during therapy sessions    ASSESSMENT     Roxanne presented with improve TC joint on arrival. Needs improve GSS strength as seen on leg press. Patient progressing as tolerated " very well, relatively pain free and will try a strength class on peloton    Roxanne Is progressing well towards her goals.   Pt prognosis is Good.     Pt will continue to benefit from skilled outpatient physical therapy to address the deficits listed in the problem list box on initial evaluation, provide pt/family education and to maximize pt's level of independence in the home and community environment.     Pt's spiritual, cultural and educational needs considered and pt agreeable to plan of care and goals.     Anticipated barriers to physical therapy: Work schedule     Goals:   Short Term Goals:  6 weeks (met)  1.Report decreased ankle pain  < / =  2/10  to increase tolerance for ambulating in tennis shoesmet)  2. Increase ROM to 5 degrees DF in order to walk with min to no compensation.met)  3. Increase strength by 1/3 MMT grade for ankle intrinsic  to increase tolerance for ADL and work activities.met)  4. Pt to tolerate HEP to improve ROM and independence with ADL'smet)     Long Term Goals: 18 weeks (progressing, not met)   1.Report decreased ankle pain  < / =  0/10  to increase tolerance for return to work 12 hour shift pain free (Progressing, not met)  2.Patient goal: return to working without ankle pain met)  3.Increase strength to 4+/5 for  Ankle PF/DF  to increase tolerance for ADL and work activities.(Progressing, not met)  4. Pt will report at CJ level (20-40% impaired) on Modified FIM score for mobility to demonstrate increase in LE function and mobility in home and community environment. (Progressing, not met)    PLAN   Plan of care Certification: 4/12/2022 to 10/12/2022.    Continue with current plan of care with emphasis on ankle mobility, posterior chain strengthening strengthening, and gait mechanics.       Rick Manjarrez, PT

## 2022-10-12 ENCOUNTER — PATIENT MESSAGE (OUTPATIENT)
Dept: GASTROENTEROLOGY | Facility: CLINIC | Age: 32
End: 2022-10-12
Payer: COMMERCIAL

## 2022-10-17 DIAGNOSIS — R19.5 LOOSE STOOLS: ICD-10-CM

## 2022-10-17 DIAGNOSIS — K92.1 HEMATOCHEZIA: ICD-10-CM

## 2022-10-17 DIAGNOSIS — K92.1 BLOOD IN STOOL: Primary | ICD-10-CM

## 2022-10-17 NOTE — TELEPHONE ENCOUNTER
Dr. Miller please advise. Patient scheduled the pre-admit endo appointment. Patient no showed appointment. I am unable to reschedule appointment because the referral is no longer available. Will need a new referral.     Thanks,

## 2022-10-18 ENCOUNTER — TELEPHONE (OUTPATIENT)
Dept: ENDOSCOPY | Facility: HOSPITAL | Age: 32
End: 2022-10-18
Payer: COMMERCIAL

## 2022-10-18 ENCOUNTER — PATIENT MESSAGE (OUTPATIENT)
Dept: ENDOSCOPY | Facility: HOSPITAL | Age: 32
End: 2022-10-18
Payer: COMMERCIAL

## 2022-10-18 VITALS — HEIGHT: 67 IN | WEIGHT: 170 LBS | BODY MASS INDEX: 26.68 KG/M2

## 2022-10-18 DIAGNOSIS — R19.5 LOOSE STOOLS: ICD-10-CM

## 2022-10-18 DIAGNOSIS — K92.1 HEMATOCHEZIA: ICD-10-CM

## 2022-10-18 DIAGNOSIS — Z12.11 SPECIAL SCREENING FOR MALIGNANT NEOPLASMS, COLON: ICD-10-CM

## 2022-10-18 DIAGNOSIS — K92.1 BLOOD IN STOOL: Primary | ICD-10-CM

## 2022-10-18 RX ORDER — POLYETHYLENE GLYCOL 3350, SODIUM SULFATE ANHYDROUS, SODIUM BICARBONATE, SODIUM CHLORIDE, POTASSIUM CHLORIDE 236; 22.74; 6.74; 5.86; 2.97 G/4L; G/4L; G/4L; G/4L; G/4L
4 POWDER, FOR SOLUTION ORAL ONCE
Qty: 4000 ML | Refills: 0 | Status: SHIPPED | OUTPATIENT
Start: 2022-10-18 | End: 2022-10-18

## 2022-10-27 ENCOUNTER — TELEPHONE (OUTPATIENT)
Dept: ENDOSCOPY | Facility: HOSPITAL | Age: 32
End: 2022-10-27
Payer: COMMERCIAL

## 2022-10-27 NOTE — TELEPHONE ENCOUNTER
Pt called to get her procedure 11/3 reschedule to early December.  Pt states she has new insurance and the procedure will be cheaper   Pt requesting a call back   Thanks

## 2022-11-02 ENCOUNTER — LAB VISIT (OUTPATIENT)
Dept: LAB | Facility: OTHER | Age: 32
End: 2022-11-02
Attending: INTERNAL MEDICINE
Payer: COMMERCIAL

## 2022-11-02 DIAGNOSIS — R77.0 LOW SERUM ALBUMIN: ICD-10-CM

## 2022-11-02 DIAGNOSIS — D50.9 IRON DEFICIENCY ANEMIA, UNSPECIFIED IRON DEFICIENCY ANEMIA TYPE: ICD-10-CM

## 2022-11-02 LAB
ALBUMIN SERPL BCP-MCNC: 3 G/DL (ref 3.5–5.2)
ALP SERPL-CCNC: 87 U/L (ref 55–135)
ALT SERPL W/O P-5'-P-CCNC: 18 U/L (ref 10–44)
ANION GAP SERPL CALC-SCNC: 5 MMOL/L (ref 8–16)
AST SERPL-CCNC: 16 U/L (ref 10–40)
BILIRUB SERPL-MCNC: 0.3 MG/DL (ref 0.1–1)
BUN SERPL-MCNC: 6 MG/DL (ref 6–20)
CALCIUM SERPL-MCNC: 9 MG/DL (ref 8.7–10.5)
CHLORIDE SERPL-SCNC: 108 MMOL/L (ref 95–110)
CO2 SERPL-SCNC: 26 MMOL/L (ref 23–29)
CREAT SERPL-MCNC: 0.8 MG/DL (ref 0.5–1.4)
EST. GFR  (NO RACE VARIABLE): >60 ML/MIN/1.73 M^2
FERRITIN SERPL-MCNC: 15 NG/ML (ref 20–300)
GLUCOSE SERPL-MCNC: 96 MG/DL (ref 70–110)
HGB BLD-MCNC: 11 G/DL (ref 12–16)
IRON SERPL-MCNC: 21 UG/DL (ref 30–160)
POTASSIUM SERPL-SCNC: 4 MMOL/L (ref 3.5–5.1)
PROT SERPL-MCNC: 6.7 G/DL (ref 6–8.4)
SATURATED IRON: 5 % (ref 20–50)
SODIUM SERPL-SCNC: 139 MMOL/L (ref 136–145)
TOTAL IRON BINDING CAPACITY: 423 UG/DL (ref 250–450)
TRANSFERRIN SERPL-MCNC: 286 MG/DL (ref 200–375)

## 2022-11-02 PROCEDURE — 82728 ASSAY OF FERRITIN: CPT | Performed by: INTERNAL MEDICINE

## 2022-11-02 PROCEDURE — 36415 COLL VENOUS BLD VENIPUNCTURE: CPT | Performed by: INTERNAL MEDICINE

## 2022-11-02 PROCEDURE — 85018 HEMOGLOBIN: CPT | Performed by: INTERNAL MEDICINE

## 2022-11-02 PROCEDURE — 84466 ASSAY OF TRANSFERRIN: CPT | Performed by: INTERNAL MEDICINE

## 2022-11-02 PROCEDURE — 80053 COMPREHEN METABOLIC PANEL: CPT | Performed by: INTERNAL MEDICINE

## 2022-11-06 DIAGNOSIS — D50.9 IRON DEFICIENCY ANEMIA, UNSPECIFIED IRON DEFICIENCY ANEMIA TYPE: Primary | ICD-10-CM

## 2022-11-06 NOTE — PROGRESS NOTES
Frandy - please tell patient that they are iron deficient and anemic and recommend that they take ferrous gluconate one 324mg pill once daily for next 3 months.    Please order repeat fasting Hemoglobin, Iron/TIBC, and Ferritin in 8 weeks - Orders placed.    Roxanne EGD colonoscopy for further evaluation Has been scheduled.  To help you get a good bowel prep you may want to stop your oral iron 3 days prior to the EGD colonoscopy and then you could restart the day after.

## 2022-11-07 ENCOUNTER — CLINICAL SUPPORT (OUTPATIENT)
Dept: REHABILITATION | Facility: HOSPITAL | Age: 32
End: 2022-11-07
Payer: COMMERCIAL

## 2022-11-07 DIAGNOSIS — M25.572 ACUTE LEFT ANKLE PAIN: Primary | ICD-10-CM

## 2022-11-07 PROCEDURE — 97140 MANUAL THERAPY 1/> REGIONS: CPT

## 2022-11-07 PROCEDURE — 97110 THERAPEUTIC EXERCISES: CPT

## 2022-11-07 PROCEDURE — 97112 NEUROMUSCULAR REEDUCATION: CPT

## 2022-11-07 NOTE — PROGRESS NOTES
[facility]   Physical Therapy Treatment Note     Name: Roxanne Hardy  Clinic Number: [id number]    Therapy Diagnosis:   Encounter Diagnosis   Name Primary?    Acute left ankle pain Yes       Physician: Kaveh Bobo DO    Visit Date: 11/7/2022  Physician Orders: PT Eval and Treat   Medical Diagnosis from Referral: Z98.890 (ICD-10-CM) - Post-operative state  Evaluation Date: 4/12/2022  Authorization Period Expiration: 12/31/2022  Plan of Care Expiration: 10/12/2022  Visit # / Visits authorized: 30/30  **30 PT/OT/ST visit limit    PTA Visit #: 0/5     FOTO first follow up:   FOTO second follow up:     Time In: 901am   Time Out: 958am  Total Billable Time: 57 minutes    Precautions: Weightbearing      Date of Surgery: 3/3/2022    SUBJECTIVE     Pt reports: Her ankle has been feeling much better but still feels that she doesn't have equal strength bilaterally.      She was compliant with home exercise program.  Response to previous treatment: Improved gait tolerance  Functional change: Ambulating no assistive device    Pain: 0/10  Location: left ankle       OBJECTIVE     Limited scar mobility  Hamstrings 4-/5 bilaterally  Glute med 4/5  Inv/ev 4/5    Treatment     Roxanne received the treatments listed below:      therapeutic exercises to develop strength, endurance, ROM and flexibility for 31 minutes including:    Pt education on continuation with HEP, gait mechanics,  Lateral walk YTB ankles and feet for ankle eversion 3 laps turf  SL balance w/ GTB under L great toe for FHL activation x 5'  Split stance SL gastroc, soleus lift 2x15 B  SL lateral step down 3x8 B    NT   Eccentric 2 up 1 down heel raises with band assist 2 x 15  Lateral walks GTB across shoe laces 3 laps  SL RDL at CC 7# 2 x 10  Hip thrust DL 2 x 20  Hip abduction sliders 2 x 15 mirror feedback   Bridges with Heel raise 3 x 10   SL Hip Abd 2# 2 x 10   Swiss bal bridge with curl 2 x 15  -super set knees bent bridges 2 x  "15  Heel raises SL 60# 3 x 15  Soleus DL heel raise 100# 3 x 15      Manual therapy techniques: Joint mobilizations, Soft tissue Mobilization and scar mobs were applied to the: Left ankle for 10 minutes, including:    Talocrural anterior to posterior mobilizations in open packed position grade IV  Subtalar mobilizations grade IV  Great toe mob Gr IV extension and IP joint    Not today:  Gr V medial cuneiform AP  TC P/A in open packed, grade 4  Metatarsal splaying   Scar mobilizations       Neuromuscular re-education activities to improve: Balance, Coordination, Kinesthetic and Sense for 16 minutes. The following activities were included:    Step up with march on BOSU 3x10 B  Lateral step up x15B on BOSU  SL Ball Toss on airex 3x10 B    NP  Forward lunge onto BOSU 2 x 15 on R  Sneaky lunge iso holds 2 x 10 5"  Deadlift 26.4# KB 2 x 15    NT  SL balance blue pad with 2# weight transfers side to side,  around the back 3x to fatigue each   Split squat on airex 5x to fatigue   Ankle rockerboard Inv/Ev 3' - NT  Rockerboard squats behind mat 3 x 10  SL RDL into plate press 2 x 10 10#  Lunges 2 x 5 B 10# KB  Squats 0-70 with GTB around knees 3 x 8   Side lunge onto BOSU 2 x 15 mirror feedback    Stagger stance 6# high low 2 x 15    therapeutic activities to improve functional performance for 0 minutes, including:    gait training to improve functional mobility and safety for 0 minutes, including:    Using bilateral crutches and utilizing CAM boot:  -Step-to gait pattern 4x15 feet   -Step-through gait pattern 6x30 feet       Patient Education and Home Exercises     Home Exercises Provided and Patient Education Provided     Education provided:   - Continue with HEP  - CAM boot with step through gait pattern     Written Home Exercises Provided: Patient instructed to cont prior HEP. Exercises were reviewed and Roxanne was able to demonstrate them prior to the end of the session.  Roxanne demonstrated good  understanding of the " education provided. See EMR under Patient Instructions for exercises provided during therapy sessions    ASSESSMENT     Roxanne is challenged with increased SL stability work in today. Mild L knee valgus noted with lateral step downs, improved with tactile + verbal cuing. L ankle PF strength fatigues quicker than R at this time.     Roxanne Is progressing well towards her goals.   Pt prognosis is Good.     Pt will continue to benefit from skilled outpatient physical therapy to address the deficits listed in the problem list box on initial evaluation, provide pt/family education and to maximize pt's level of independence in the home and community environment.     Pt's spiritual, cultural and educational needs considered and pt agreeable to plan of care and goals.     Anticipated barriers to physical therapy: Work schedule     Goals:   Short Term Goals:  6 weeks (met)  1.Report decreased ankle pain  < / =  2/10  to increase tolerance for ambulating in tennis shoesmet)  2. Increase ROM to 5 degrees DF in order to walk with min to no compensation.met)  3. Increase strength by 1/3 MMT grade for ankle intrinsic  to increase tolerance for ADL and work activities.met)  4. Pt to tolerate HEP to improve ROM and independence with ADL'smet)     Long Term Goals: 18 weeks (progressing, not met)   1.Report decreased ankle pain  < / =  0/10  to increase tolerance for return to work 12 hour shift pain free (Progressing, not met)  2.Patient goal: return to working without ankle pain met)  3.Increase strength to 4+/5 for  Ankle PF/DF  to increase tolerance for ADL and work activities.(Progressing, not met)  4. Pt will report at CJ level (20-40% impaired) on Modified FIM score for mobility to demonstrate increase in LE function and mobility in home and community environment. (Progressing, not met)    PLAN   Plan of care Certification: 4/12/2022 to 10/12/2022.    Continue with current plan of care with emphasis on ankle mobility, posterior  chain strengthening strengthening, and gait mechanics. Will continue with current plan of care pending authorization.       Mykel Miller, PT, DPT

## 2022-11-17 ENCOUNTER — PATIENT MESSAGE (OUTPATIENT)
Dept: GASTROENTEROLOGY | Facility: CLINIC | Age: 32
End: 2022-11-17
Payer: COMMERCIAL

## 2022-11-17 ENCOUNTER — TELEPHONE (OUTPATIENT)
Dept: GASTROENTEROLOGY | Facility: CLINIC | Age: 32
End: 2022-11-17
Payer: COMMERCIAL

## 2022-11-17 NOTE — TELEPHONE ENCOUNTER
Per Dr. Miller, please tell patient that they are iron deficient and anemic and recommend that they take ferrous gluconate one 324mg pill once daily for next 3 months.     Please order repeat fasting Hemoglobin, Iron/TIBC, and Ferritin in 8 weeks - Orders placed.     Roxanne EGD colonoscopy for further evaluation Has been scheduled.  To help you get a good bowel prep you may want to stop your oral iron 3 days prior to the EGD colonoscopy and then you could restart the day after    Patient has received and read provider message.   All appointments has been scheduled  Message was sent to patient portal.

## 2022-11-17 NOTE — TELEPHONE ENCOUNTER
----- Message from Ok Miller MD sent at 11/6/2022 11:38 AM CST -----  Frandy - please tell patient that they are iron deficient and anemic and recommend that they take ferrous gluconate one 324mg pill once daily for next 3 months.    Please order repeat fasting Hemoglobin, Iron/TIBC, and Ferritin in 8 weeks - Orders placed.    Roxanne EGD colonoscopy for further evaluation Has been scheduled.  To help you get a good bowel prep you may want to stop your oral iron 3 days prior to the EGD colonoscopy and then you could restart the day after.

## 2022-12-20 ENCOUNTER — TELEPHONE (OUTPATIENT)
Dept: INTERNAL MEDICINE | Facility: CLINIC | Age: 32
End: 2022-12-20
Payer: COMMERCIAL

## 2023-03-23 ENCOUNTER — OFFICE VISIT (OUTPATIENT)
Dept: OPTOMETRY | Facility: CLINIC | Age: 33
End: 2023-03-23
Payer: COMMERCIAL

## 2023-03-23 DIAGNOSIS — H52.13 MYOPIA WITH ASTIGMATISM, BILATERAL: Primary | ICD-10-CM

## 2023-03-23 DIAGNOSIS — H10.89 GPC (GIANT PAPILLARY CONJUNCTIVITIS): ICD-10-CM

## 2023-03-23 DIAGNOSIS — Z46.0 FITTING AND ADJUSTMENT OF SPECTACLES AND CONTACT LENSES: Primary | ICD-10-CM

## 2023-03-23 DIAGNOSIS — H52.203 MYOPIA WITH ASTIGMATISM, BILATERAL: Primary | ICD-10-CM

## 2023-03-23 PROCEDURE — 99999 PR PBB SHADOW E&M-EST. PATIENT-LVL II: ICD-10-PCS | Mod: PBBFAC,,, | Performed by: OPTOMETRIST

## 2023-03-23 PROCEDURE — 92015 PR REFRACTION: ICD-10-PCS | Mod: S$GLB,,, | Performed by: OPTOMETRIST

## 2023-03-23 PROCEDURE — 99999 PR PBB SHADOW E&M-EST. PATIENT-LVL II: CPT | Mod: PBBFAC,,, | Performed by: OPTOMETRIST

## 2023-03-23 PROCEDURE — 92015 DETERMINE REFRACTIVE STATE: CPT | Mod: S$GLB,,, | Performed by: OPTOMETRIST

## 2023-03-23 PROCEDURE — 92310 PR CONTACT LENS FITTING (NO CHANGE): ICD-10-PCS | Mod: S$GLB,,, | Performed by: OPTOMETRIST

## 2023-03-23 PROCEDURE — 92310 CONTACT LENS FITTING OU: CPT | Mod: S$GLB,,, | Performed by: OPTOMETRIST

## 2023-03-23 PROCEDURE — 92014 COMPRE OPH EXAM EST PT 1/>: CPT | Mod: S$GLB,,, | Performed by: OPTOMETRIST

## 2023-03-23 PROCEDURE — 99999 PR PBB SHADOW E&M-EST. PATIENT-LVL III: ICD-10-PCS | Mod: PBBFAC,,, | Performed by: OPTOMETRIST

## 2023-03-23 PROCEDURE — 92014 PR EYE EXAM, EST PATIENT,COMPREHESV: ICD-10-PCS | Mod: S$GLB,,, | Performed by: OPTOMETRIST

## 2023-03-23 PROCEDURE — 99999 PR PBB SHADOW E&M-EST. PATIENT-LVL III: CPT | Mod: PBBFAC,,, | Performed by: OPTOMETRIST

## 2023-03-23 NOTE — PROGRESS NOTES
OMEGA    LEXIS: 03/22 with Dr. Chew  Chief complaint (CC): Patient is here for annual eye exam today.  Patient   hasn't noticed any vision changes since the last exam.  Glasses and   contacts still seem fine.  Glasses? +  Contacts? +  H/o eye surgery, injections or laser: -  H/o eye injury: -  Known eye conditions? See above  Family h/o eye conditions? -  Eye gtts? -      (-) Flashes (-)  Floaters (-) Mucous   (-)  Tearing (-) Itching (-) Burning   (-) Headaches (-) Eye Pain/discomfort (-) Irritation   (-)  Redness (-) Double vision (-) Blurry vision    Diabetic? -  A1c? -      Last edited by Flores Robles on 3/23/2023  1:12 PM.            Assessment /Plan     For exam results, see Encounter Report.    Myopia with astigmatism, bilateral    GPC (giant papillary conjunctivitis)      CLRx and SRx released to patient. Patient educated on lens options. Normal ocular health. RTC 1 year for routine exam.   No recent flare ups of GPC. MOnitor.

## 2023-03-31 ENCOUNTER — PATIENT MESSAGE (OUTPATIENT)
Dept: OPTOMETRY | Facility: CLINIC | Age: 33
End: 2023-03-31
Payer: COMMERCIAL

## 2023-05-30 ENCOUNTER — OFFICE VISIT (OUTPATIENT)
Dept: DERMATOLOGY | Facility: CLINIC | Age: 33
End: 2023-05-30
Payer: COMMERCIAL

## 2023-05-30 DIAGNOSIS — D22.9 MULTIPLE BENIGN NEVI: ICD-10-CM

## 2023-05-30 DIAGNOSIS — Z12.83 SCREENING EXAM FOR SKIN CANCER: Primary | ICD-10-CM

## 2023-05-30 DIAGNOSIS — L90.5 SCAR: ICD-10-CM

## 2023-05-30 PROCEDURE — 1159F PR MEDICATION LIST DOCUMENTED IN MEDICAL RECORD: ICD-10-PCS | Mod: CPTII,S$GLB,, | Performed by: DERMATOLOGY

## 2023-05-30 PROCEDURE — 99999 PR PBB SHADOW E&M-EST. PATIENT-LVL II: CPT | Mod: PBBFAC,,, | Performed by: DERMATOLOGY

## 2023-05-30 PROCEDURE — 99999 PR PBB SHADOW E&M-EST. PATIENT-LVL II: ICD-10-PCS | Mod: PBBFAC,,, | Performed by: DERMATOLOGY

## 2023-05-30 PROCEDURE — 99213 PR OFFICE/OUTPT VISIT, EST, LEVL III, 20-29 MIN: ICD-10-PCS | Mod: S$GLB,,, | Performed by: DERMATOLOGY

## 2023-05-30 PROCEDURE — 99213 OFFICE O/P EST LOW 20 MIN: CPT | Mod: S$GLB,,, | Performed by: DERMATOLOGY

## 2023-05-30 PROCEDURE — 1159F MED LIST DOCD IN RCRD: CPT | Mod: CPTII,S$GLB,, | Performed by: DERMATOLOGY

## 2023-05-30 NOTE — PROGRESS NOTES
Subjective:      Patient ID:  Roxanne Harmon is a 33 y.o. female who presents for   Chief Complaint   Patient presents with    Skin Check     TBSE     History of Present Illness: The patient presents for follow up of skin check.    The patient was last seen on: 01/04/2022 for skin check.   No h/o nmsc    Other skin complaints: scar on L ankle from surgery     Review of Systems   Skin:  Positive for activity-related sunscreen use. Negative for daily sunscreen use, recent sunburn and wears hat.   Hematologic/Lymphatic: Does not bruise/bleed easily.     Objective:   Physical Exam   Constitutional: She appears well-developed and well-nourished.   Neurological: She is alert and oriented to person, place, and time.   Psychiatric: She has a normal mood and affect.   Skin:   Areas Examined (abnormalities noted in diagram):   Scalp / Hair Palpated and Inspected  Head / Face Inspection Performed  Neck Inspection Performed  Chest / Axilla Inspection Performed  Abdomen Inspection Performed  Genitals / Buttocks / Groin Inspection Performed  Back Inspection Performed  RUE Inspected  LUE Inspection Performed  RLE Inspected  LLE Inspection Performed  Nails and Digits Inspection Performed               Diagram Legend     Erythematous scaling macule/papule c/w actinic keratosis       Vascular papule c/w angioma      Pigmented verrucoid papule/plaque c/w seborrheic keratosis      Yellow umbilicated papule c/w sebaceous hyperplasia      Irregularly shaped tan macule c/w lentigo     1-2 mm smooth white papules consistent with Milia      Movable subcutaneous cyst with punctum c/w epidermal inclusion cyst      Subcutaneous movable cyst c/w pilar cyst      Firm pink to brown papule c/w dermatofibroma      Pedunculated fleshy papule(s) c/w skin tag(s)      Evenly pigmented macule c/w junctional nevus     Mildly variegated pigmented, slightly irregular-bordered macule c/w mildly atypical nevus      Flesh colored to evenly pigmented  papule c/w intradermal nevus       Pink pearly papule/plaque c/w basal cell carcinoma      Erythematous hyperkeratotic cursted plaque c/w SCC      Surgical scar with no sign of skin cancer recurrence      Open and closed comedones      Inflammatory papules and pustules      Verrucoid papule consistent consistent with wart     Erythematous eczematous patches and plaques     Dystrophic onycholytic nail with subungual debris c/w onychomycosis     Umbilicated papule    Erythematous-base heme-crusted tan verrucoid plaque consistent with inflamed seborrheic keratosis     Erythematous Silvery Scaling Plaque c/w Psoriasis     See annotation      Assessment / Plan:        Screening exam for skin cancer    Multiple benign nevi    Scar           Total body skin examination performed today including at least 12 points as noted in physical examination. No lesions suspicious for malignancy noted.    Recommend daily sun protection/avoidance, use of at least SPF 30, broad spectrum sunscreen (OTC drug), skin self examinations, and routine physician surveillance to optimize early detection    Discussed ABCDE's of nevi.  Monitor for new mole or moles that are becoming bigger, darker, irritated, or developing irregular borders. Instructed patient to observe lesion(s) for changes and follow up in clinic if changes are noted. Patient to monitor skin at home for new or changing lesions.     Advise yearly monitoring due to scalp nevi.    Scar on left ankle from surgery - well healed.  No follow-ups on file.

## 2023-06-27 ENCOUNTER — OFFICE VISIT (OUTPATIENT)
Dept: INTERNAL MEDICINE | Facility: CLINIC | Age: 33
End: 2023-06-27
Payer: COMMERCIAL

## 2023-06-27 VITALS
WEIGHT: 192.44 LBS | HEART RATE: 87 BPM | DIASTOLIC BLOOD PRESSURE: 80 MMHG | BODY MASS INDEX: 30.2 KG/M2 | SYSTOLIC BLOOD PRESSURE: 110 MMHG | OXYGEN SATURATION: 98 % | HEIGHT: 67 IN

## 2023-06-27 DIAGNOSIS — Z00.00 ANNUAL PHYSICAL EXAM: Primary | ICD-10-CM

## 2023-06-27 DIAGNOSIS — Z91.030 BEE STING ALLERGY: ICD-10-CM

## 2023-06-27 DIAGNOSIS — D50.9 IRON DEFICIENCY ANEMIA, UNSPECIFIED IRON DEFICIENCY ANEMIA TYPE: ICD-10-CM

## 2023-06-27 PROCEDURE — 99395 PR PREVENTIVE VISIT,EST,18-39: ICD-10-PCS | Mod: S$GLB,,, | Performed by: STUDENT IN AN ORGANIZED HEALTH CARE EDUCATION/TRAINING PROGRAM

## 2023-06-27 PROCEDURE — 99395 PREV VISIT EST AGE 18-39: CPT | Mod: S$GLB,,, | Performed by: STUDENT IN AN ORGANIZED HEALTH CARE EDUCATION/TRAINING PROGRAM

## 2023-06-27 PROCEDURE — 3008F BODY MASS INDEX DOCD: CPT | Mod: CPTII,S$GLB,, | Performed by: STUDENT IN AN ORGANIZED HEALTH CARE EDUCATION/TRAINING PROGRAM

## 2023-06-27 PROCEDURE — 3079F DIAST BP 80-89 MM HG: CPT | Mod: CPTII,S$GLB,, | Performed by: STUDENT IN AN ORGANIZED HEALTH CARE EDUCATION/TRAINING PROGRAM

## 2023-06-27 PROCEDURE — 99999 PR PBB SHADOW E&M-EST. PATIENT-LVL III: ICD-10-PCS | Mod: PBBFAC,,, | Performed by: STUDENT IN AN ORGANIZED HEALTH CARE EDUCATION/TRAINING PROGRAM

## 2023-06-27 PROCEDURE — 1159F MED LIST DOCD IN RCRD: CPT | Mod: CPTII,S$GLB,, | Performed by: STUDENT IN AN ORGANIZED HEALTH CARE EDUCATION/TRAINING PROGRAM

## 2023-06-27 PROCEDURE — 99999 PR PBB SHADOW E&M-EST. PATIENT-LVL III: CPT | Mod: PBBFAC,,, | Performed by: STUDENT IN AN ORGANIZED HEALTH CARE EDUCATION/TRAINING PROGRAM

## 2023-06-27 PROCEDURE — 3074F SYST BP LT 130 MM HG: CPT | Mod: CPTII,S$GLB,, | Performed by: STUDENT IN AN ORGANIZED HEALTH CARE EDUCATION/TRAINING PROGRAM

## 2023-06-27 PROCEDURE — 3008F PR BODY MASS INDEX (BMI) DOCUMENTED: ICD-10-PCS | Mod: CPTII,S$GLB,, | Performed by: STUDENT IN AN ORGANIZED HEALTH CARE EDUCATION/TRAINING PROGRAM

## 2023-06-27 PROCEDURE — 1159F PR MEDICATION LIST DOCUMENTED IN MEDICAL RECORD: ICD-10-PCS | Mod: CPTII,S$GLB,, | Performed by: STUDENT IN AN ORGANIZED HEALTH CARE EDUCATION/TRAINING PROGRAM

## 2023-06-27 PROCEDURE — 3074F PR MOST RECENT SYSTOLIC BLOOD PRESSURE < 130 MM HG: ICD-10-PCS | Mod: CPTII,S$GLB,, | Performed by: STUDENT IN AN ORGANIZED HEALTH CARE EDUCATION/TRAINING PROGRAM

## 2023-06-27 PROCEDURE — 3079F PR MOST RECENT DIASTOLIC BLOOD PRESSURE 80-89 MM HG: ICD-10-PCS | Mod: CPTII,S$GLB,, | Performed by: STUDENT IN AN ORGANIZED HEALTH CARE EDUCATION/TRAINING PROGRAM

## 2023-06-27 RX ORDER — EPINEPHRINE 0.3 MG/.3ML
1 INJECTION SUBCUTANEOUS ONCE AS NEEDED
Qty: 2 EACH | Refills: 3 | Status: SHIPPED | OUTPATIENT
Start: 2023-06-27 | End: 2023-06-27
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
## 2023-07-12 ENCOUNTER — LAB VISIT (OUTPATIENT)
Dept: LAB | Facility: OTHER | Age: 33
End: 2023-07-12
Attending: STUDENT IN AN ORGANIZED HEALTH CARE EDUCATION/TRAINING PROGRAM
Payer: COMMERCIAL

## 2023-07-12 DIAGNOSIS — Z00.00 ANNUAL PHYSICAL EXAM: ICD-10-CM

## 2023-07-12 DIAGNOSIS — D50.9 IRON DEFICIENCY ANEMIA, UNSPECIFIED IRON DEFICIENCY ANEMIA TYPE: ICD-10-CM

## 2023-07-12 LAB
ALBUMIN SERPL BCP-MCNC: 3.8 G/DL (ref 3.5–5.2)
ALP SERPL-CCNC: 84 U/L (ref 55–135)
ALT SERPL W/O P-5'-P-CCNC: 21 U/L (ref 10–44)
ANION GAP SERPL CALC-SCNC: 10 MMOL/L (ref 8–16)
AST SERPL-CCNC: 23 U/L (ref 10–40)
BASOPHILS # BLD AUTO: 0.05 K/UL (ref 0–0.2)
BASOPHILS NFR BLD: 0.7 % (ref 0–1.9)
BILIRUB SERPL-MCNC: 0.5 MG/DL (ref 0.1–1)
BUN SERPL-MCNC: 10 MG/DL (ref 6–20)
CALCIUM SERPL-MCNC: 9.6 MG/DL (ref 8.7–10.5)
CHLORIDE SERPL-SCNC: 103 MMOL/L (ref 95–110)
CHOLEST SERPL-MCNC: 163 MG/DL (ref 120–199)
CHOLEST/HDLC SERPL: 2.4 {RATIO} (ref 2–5)
CO2 SERPL-SCNC: 24 MMOL/L (ref 23–29)
CREAT SERPL-MCNC: 0.7 MG/DL (ref 0.5–1.4)
DIFFERENTIAL METHOD: NORMAL
EOSINOPHIL # BLD AUTO: 0.1 K/UL (ref 0–0.5)
EOSINOPHIL NFR BLD: 1.1 % (ref 0–8)
ERYTHROCYTE [DISTWIDTH] IN BLOOD BY AUTOMATED COUNT: 13.1 % (ref 11.5–14.5)
EST. GFR  (NO RACE VARIABLE): >60 ML/MIN/1.73 M^2
ESTIMATED AVG GLUCOSE: 100 MG/DL (ref 68–131)
FERRITIN SERPL-MCNC: 34 NG/ML (ref 20–300)
GLUCOSE SERPL-MCNC: 92 MG/DL (ref 70–110)
HBA1C MFR BLD: 5.1 % (ref 4–5.6)
HCT VFR BLD AUTO: 43.3 % (ref 37–48.5)
HDLC SERPL-MCNC: 68 MG/DL (ref 40–75)
HDLC SERPL: 41.7 % (ref 20–50)
HGB BLD-MCNC: 14.4 G/DL (ref 12–16)
IMM GRANULOCYTES # BLD AUTO: 0.03 K/UL (ref 0–0.04)
IMM GRANULOCYTES NFR BLD AUTO: 0.4 % (ref 0–0.5)
IRON SERPL-MCNC: 78 UG/DL (ref 30–160)
LDLC SERPL CALC-MCNC: 83 MG/DL (ref 63–159)
LYMPHOCYTES # BLD AUTO: 2.5 K/UL (ref 1–4.8)
LYMPHOCYTES NFR BLD: 33.5 % (ref 18–48)
MCH RBC QN AUTO: 30.1 PG (ref 27–31)
MCHC RBC AUTO-ENTMCNC: 33.3 G/DL (ref 32–36)
MCV RBC AUTO: 91 FL (ref 82–98)
MONOCYTES # BLD AUTO: 0.7 K/UL (ref 0.3–1)
MONOCYTES NFR BLD: 9.7 % (ref 4–15)
NEUTROPHILS # BLD AUTO: 4 K/UL (ref 1.8–7.7)
NEUTROPHILS NFR BLD: 54.6 % (ref 38–73)
NONHDLC SERPL-MCNC: 95 MG/DL
NRBC BLD-RTO: 0 /100 WBC
PLATELET # BLD AUTO: 257 K/UL (ref 150–450)
PMV BLD AUTO: 9.5 FL (ref 9.2–12.9)
POTASSIUM SERPL-SCNC: 3.7 MMOL/L (ref 3.5–5.1)
PROT SERPL-MCNC: 7 G/DL (ref 6–8.4)
RBC # BLD AUTO: 4.78 M/UL (ref 4–5.4)
SATURATED IRON: 21 % (ref 20–50)
SODIUM SERPL-SCNC: 137 MMOL/L (ref 136–145)
T4 FREE SERPL-MCNC: 0.89 NG/DL (ref 0.71–1.51)
TOTAL IRON BINDING CAPACITY: 371 UG/DL (ref 250–450)
TRANSFERRIN SERPL-MCNC: 251 MG/DL (ref 200–375)
TRIGL SERPL-MCNC: 60 MG/DL (ref 30–150)
TSH SERPL DL<=0.005 MIU/L-ACNC: 4.04 UIU/ML (ref 0.4–4)
WBC # BLD AUTO: 7.32 K/UL (ref 3.9–12.7)

## 2023-07-12 PROCEDURE — 80061 LIPID PANEL: CPT | Performed by: STUDENT IN AN ORGANIZED HEALTH CARE EDUCATION/TRAINING PROGRAM

## 2023-07-12 PROCEDURE — 36415 COLL VENOUS BLD VENIPUNCTURE: CPT | Performed by: STUDENT IN AN ORGANIZED HEALTH CARE EDUCATION/TRAINING PROGRAM

## 2023-07-12 PROCEDURE — 84439 ASSAY OF FREE THYROXINE: CPT | Performed by: STUDENT IN AN ORGANIZED HEALTH CARE EDUCATION/TRAINING PROGRAM

## 2023-07-12 PROCEDURE — 80053 COMPREHEN METABOLIC PANEL: CPT | Performed by: STUDENT IN AN ORGANIZED HEALTH CARE EDUCATION/TRAINING PROGRAM

## 2023-07-12 PROCEDURE — 84443 ASSAY THYROID STIM HORMONE: CPT | Performed by: STUDENT IN AN ORGANIZED HEALTH CARE EDUCATION/TRAINING PROGRAM

## 2023-07-12 PROCEDURE — 84466 ASSAY OF TRANSFERRIN: CPT | Performed by: STUDENT IN AN ORGANIZED HEALTH CARE EDUCATION/TRAINING PROGRAM

## 2023-07-12 PROCEDURE — 82728 ASSAY OF FERRITIN: CPT | Performed by: STUDENT IN AN ORGANIZED HEALTH CARE EDUCATION/TRAINING PROGRAM

## 2023-07-12 PROCEDURE — 83036 HEMOGLOBIN GLYCOSYLATED A1C: CPT | Performed by: STUDENT IN AN ORGANIZED HEALTH CARE EDUCATION/TRAINING PROGRAM

## 2023-07-12 PROCEDURE — 85025 COMPLETE CBC W/AUTO DIFF WBC: CPT | Performed by: STUDENT IN AN ORGANIZED HEALTH CARE EDUCATION/TRAINING PROGRAM

## 2023-07-17 ENCOUNTER — PATIENT MESSAGE (OUTPATIENT)
Dept: INTERNAL MEDICINE | Facility: CLINIC | Age: 33
End: 2023-07-17
Payer: COMMERCIAL

## 2023-07-17 DIAGNOSIS — E03.8 SUBCLINICAL HYPOTHYROIDISM: Primary | ICD-10-CM

## 2023-07-17 RX ORDER — LEVOTHYROXINE SODIUM 75 UG/1
75 TABLET ORAL
Qty: 60 TABLET | Refills: 0 | Status: SHIPPED | OUTPATIENT
Start: 2023-07-17 | End: 2023-09-06 | Stop reason: SDUPTHER

## 2023-07-18 ENCOUNTER — TELEPHONE (OUTPATIENT)
Dept: INTERNAL MEDICINE | Facility: CLINIC | Age: 33
End: 2023-07-18
Payer: COMMERCIAL

## 2023-07-18 NOTE — TELEPHONE ENCOUNTER
----- Message from Timoteo Lund MD sent at 7/17/2023 10:40 AM CDT -----  Tsh nonfasting 3 months if pt wishes

## 2023-07-18 NOTE — TELEPHONE ENCOUNTER
----- Message from Flora Suresh sent at 7/18/2023 10:04 AM CDT -----  Name of Who is Calling:MARTÍN NYE [[id number]]                What is the request in detail: Pt is returning a call from Ksenia Tee.Please call back to further assist.                 Can the clinic reply by [facility]: No                What Number to Call Back if not in [facility]:[phone]

## 2023-07-18 NOTE — TELEPHONE ENCOUNTER
Attempted to contact Ms. Harmon to schedule Tsh in 3 months,but no answer.  LVM to call the office.

## 2023-08-30 ENCOUNTER — LAB VISIT (OUTPATIENT)
Dept: LAB | Facility: OTHER | Age: 33
End: 2023-08-30
Attending: STUDENT IN AN ORGANIZED HEALTH CARE EDUCATION/TRAINING PROGRAM
Payer: COMMERCIAL

## 2023-08-30 ENCOUNTER — TELEPHONE (OUTPATIENT)
Dept: ENDOSCOPY | Facility: HOSPITAL | Age: 33
End: 2023-08-30
Payer: COMMERCIAL

## 2023-08-30 DIAGNOSIS — R77.0 LOW SERUM ALBUMIN: ICD-10-CM

## 2023-08-30 DIAGNOSIS — D50.9 IRON DEFICIENCY ANEMIA, UNSPECIFIED IRON DEFICIENCY ANEMIA TYPE: ICD-10-CM

## 2023-08-30 DIAGNOSIS — E61.1 IRON DEFICIENCY: Primary | ICD-10-CM

## 2023-08-30 DIAGNOSIS — E03.8 SUBCLINICAL HYPOTHYROIDISM: ICD-10-CM

## 2023-08-30 LAB
FERRITIN SERPL-MCNC: 50 NG/ML (ref 20–300)
HGB BLD-MCNC: 14.7 G/DL (ref 12–16)
IRON SERPL-MCNC: 57 UG/DL (ref 30–160)
SATURATED IRON: 16 % (ref 20–50)
TOTAL IRON BINDING CAPACITY: 358 UG/DL (ref 250–450)
TRANSFERRIN SERPL-MCNC: 242 MG/DL (ref 200–375)
TSH SERPL DL<=0.005 MIU/L-ACNC: 0.64 UIU/ML (ref 0.4–4)

## 2023-08-30 PROCEDURE — 84466 ASSAY OF TRANSFERRIN: CPT | Performed by: INTERNAL MEDICINE

## 2023-08-30 PROCEDURE — 85018 HEMOGLOBIN: CPT | Performed by: INTERNAL MEDICINE

## 2023-08-30 PROCEDURE — 84443 ASSAY THYROID STIM HORMONE: CPT | Performed by: STUDENT IN AN ORGANIZED HEALTH CARE EDUCATION/TRAINING PROGRAM

## 2023-08-30 PROCEDURE — 36415 COLL VENOUS BLD VENIPUNCTURE: CPT | Performed by: INTERNAL MEDICINE

## 2023-08-30 PROCEDURE — 82728 ASSAY OF FERRITIN: CPT | Performed by: INTERNAL MEDICINE

## 2023-08-30 PROCEDURE — 83540 ASSAY OF IRON: CPT | Performed by: INTERNAL MEDICINE

## 2023-08-30 RX ORDER — FERROUS GLUCONATE 324(38)MG
324 TABLET ORAL
Qty: 90 TABLET | Refills: 1 | Status: SHIPPED | OUTPATIENT
Start: 2023-08-30 | End: 2023-11-30

## 2023-08-31 NOTE — PROGRESS NOTES
Summer please offer Roxanne a GI clinic telemedicine video visit with me in the next few weeks to follow-up her iron deficiency but not anemia last clinic note offered her EGD colonoscopy I do not see that it was done I would like to discuss with her    GI MA team - please tell patient that they are iron deficient and anemic and recommend that they take ferrous gluconate one 324mg pill once daily for next 3 months.    GI MA team -  Please order repeat fasting Hemoglobin, Iron/TIBC, and Ferritin in 8 weeks - Orders placed.

## 2023-09-06 ENCOUNTER — TELEPHONE (OUTPATIENT)
Dept: INTERNAL MEDICINE | Facility: CLINIC | Age: 33
End: 2023-09-06
Payer: COMMERCIAL

## 2023-09-06 ENCOUNTER — PATIENT MESSAGE (OUTPATIENT)
Dept: INTERNAL MEDICINE | Facility: CLINIC | Age: 33
End: 2023-09-06
Payer: COMMERCIAL

## 2023-09-06 DIAGNOSIS — E03.8 SUBCLINICAL HYPOTHYROIDISM: ICD-10-CM

## 2023-09-06 RX ORDER — LEVOTHYROXINE SODIUM 75 UG/1
75 TABLET ORAL
Qty: 90 TABLET | Refills: 3 | Status: SHIPPED | OUTPATIENT
Start: 2023-09-06 | End: 2024-09-05

## 2023-10-17 ENCOUNTER — OFFICE VISIT (OUTPATIENT)
Dept: OBSTETRICS AND GYNECOLOGY | Facility: CLINIC | Age: 33
End: 2023-10-17
Payer: COMMERCIAL

## 2023-10-17 VITALS
SYSTOLIC BLOOD PRESSURE: 132 MMHG | HEIGHT: 67 IN | DIASTOLIC BLOOD PRESSURE: 80 MMHG | WEIGHT: 200.81 LBS | BODY MASS INDEX: 31.52 KG/M2

## 2023-10-17 DIAGNOSIS — Z01.419 ENCOUNTER FOR GYNECOLOGICAL EXAMINATION: Primary | ICD-10-CM

## 2023-10-17 DIAGNOSIS — N92.6 IRREGULAR MENSES: ICD-10-CM

## 2023-10-17 PROCEDURE — 99999 PR PBB SHADOW E&M-EST. PATIENT-LVL III: ICD-10-PCS | Mod: PBBFAC,,, | Performed by: OBSTETRICS & GYNECOLOGY

## 2023-10-17 PROCEDURE — 1159F MED LIST DOCD IN RCRD: CPT | Mod: CPTII,S$GLB,, | Performed by: OBSTETRICS & GYNECOLOGY

## 2023-10-17 PROCEDURE — 3044F PR MOST RECENT HEMOGLOBIN A1C LEVEL <7.0%: ICD-10-PCS | Mod: CPTII,S$GLB,, | Performed by: OBSTETRICS & GYNECOLOGY

## 2023-10-17 PROCEDURE — 3008F PR BODY MASS INDEX (BMI) DOCUMENTED: ICD-10-PCS | Mod: CPTII,S$GLB,, | Performed by: OBSTETRICS & GYNECOLOGY

## 2023-10-17 PROCEDURE — 3079F PR MOST RECENT DIASTOLIC BLOOD PRESSURE 80-89 MM HG: ICD-10-PCS | Mod: CPTII,S$GLB,, | Performed by: OBSTETRICS & GYNECOLOGY

## 2023-10-17 PROCEDURE — 3075F SYST BP GE 130 - 139MM HG: CPT | Mod: CPTII,S$GLB,, | Performed by: OBSTETRICS & GYNECOLOGY

## 2023-10-17 PROCEDURE — 3079F DIAST BP 80-89 MM HG: CPT | Mod: CPTII,S$GLB,, | Performed by: OBSTETRICS & GYNECOLOGY

## 2023-10-17 PROCEDURE — 99395 PR PREVENTIVE VISIT,EST,18-39: ICD-10-PCS | Mod: S$GLB,,, | Performed by: OBSTETRICS & GYNECOLOGY

## 2023-10-17 PROCEDURE — 3044F HG A1C LEVEL LT 7.0%: CPT | Mod: CPTII,S$GLB,, | Performed by: OBSTETRICS & GYNECOLOGY

## 2023-10-17 PROCEDURE — 1159F PR MEDICATION LIST DOCUMENTED IN MEDICAL RECORD: ICD-10-PCS | Mod: CPTII,S$GLB,, | Performed by: OBSTETRICS & GYNECOLOGY

## 2023-10-17 PROCEDURE — 99999 PR PBB SHADOW E&M-EST. PATIENT-LVL III: CPT | Mod: PBBFAC,,, | Performed by: OBSTETRICS & GYNECOLOGY

## 2023-10-17 PROCEDURE — 3075F PR MOST RECENT SYSTOLIC BLOOD PRESS GE 130-139MM HG: ICD-10-PCS | Mod: CPTII,S$GLB,, | Performed by: OBSTETRICS & GYNECOLOGY

## 2023-10-17 PROCEDURE — 3008F BODY MASS INDEX DOCD: CPT | Mod: CPTII,S$GLB,, | Performed by: OBSTETRICS & GYNECOLOGY

## 2023-10-17 PROCEDURE — 99395 PREV VISIT EST AGE 18-39: CPT | Mod: S$GLB,,, | Performed by: OBSTETRICS & GYNECOLOGY

## 2023-10-17 RX ORDER — PROGESTERONE 200 MG/1
CAPSULE ORAL
Qty: 30 CAPSULE | Refills: 11 | Status: SHIPPED | OUTPATIENT
Start: 2023-10-17 | End: 2024-03-13

## 2023-10-17 NOTE — PROGRESS NOTES
CC: Annual    HPI: Pt is a 33 y.o.  female who presents for routine annual exam. She previously used OCP for contraception but stopped in April to begin attempting pregnancy. She does not want STD screening. Only complaint is that she has noticed since she has been tracking her cycles that she is consistently ovulating on Day 17-18, however, her cycles are coming at Day 25. She is concerned that her cycles are coming too soon after she gets her (+) ovulation.     **NICU NNP at [facility].      FH:  Breast cancer: none  Colon cancer: none  Ovarian cancer: grandmother (70's)  Endometrial cancer: none    Last pap: 9/1/2022 NORMAL      ROS:  GENERAL: Feeling well overall. Denies fever or chills.   SKIN: Denies rash or lesions.   HEAD: Denies head injury or headache.   NODES: Denies enlarged lymph nodes.   CHEST: Denies chest pain or shortness of breath.   CARDIOVASCULAR: Denies palpitations or left sided chest pain.   ABDOMEN: No abdominal pain, constipation, diarrhea, nausea, vomiting or rectal bleeding.   URINARY: No dysuria, hematuria, or burning on urination.  REPRODUCTIVE: See HPI.   BREASTS: Denies pain, lumps, or nipple discharge.   HEMATOLOGIC: No easy bruisability or excessive bleeding.   MUSCULOSKELETAL: Denies joint pain or swelling.   NEUROLOGIC: Denies syncope or weakness.   PSYCHIATRIC: Denies depression, anxiety or mood swings.    PE:   APPEARANCE: Well nourished, well developed, White female in no acute distress.  NODES: no cervical, supraclavicular, or inguinal lymphadenopathy  BREASTS: Symmetrical, no skin changes or visible lesions. No palpable masses, nipple discharge or adenopathy bilaterally.  ABDOMEN: Soft. No tenderness or masses. No distention. No hernias palpated. No CVA tenderness.  VULVA: No lesions. Normal external female genitalia.  URETHRAL MEATUS: Normal size and location, no lesions, no prolapse.  URETHRA: No masses, tenderness, or prolapse.  VAGINA: Moist. No lesions or lacerations noted.  No abnormal discharge present. No odor present.   CERVIX: No lesions or discharge. No cervical motion tenderness.   UTERUS: Normal size, regular shape, mobile, non-tender.  ADNEXA: No tenderness. No fullness or masses palpated in the adnexal regions.   ANUS PERINEUM: Normal.      Diagnosis:  1. Encounter for gynecological examination    2. Irregular menses        Plan:     Orders Placed This Encounter    Follicle Stimulating Hormone    Estradiol    Prolactin    Luteinizing Hormone    ANTIMULLERIAN HORMONE ([facility])    progesterone (PROMETRIUM) 200 MG capsule     - pap/HPV up to date  - Rx: progesterone to begin 3 days post (+) OPK to lengthen the luteal phase. She has been instructed to take a home UPT 14 days post ovulation and if (+) continue taking progesterone. If negative, stop progesterone and allow herself to have a cycle.   - CD 3 labs ordered. To be done with next menses. She will message us.   - If no pregnancy by January, will refer to [facility] for semen analysis, etc since new benefits will be in place.     Patient was counseled today on the new ACS guidelines for cervical cytology screening as well as the current recommendations for breast cancer screening. She was counseled to follow up with her PCP for other routine health maintenance. Counseling session lasted approximately 10 minutes, and all her questions were answered.    Follow-up with me in 1 year for routine exam

## 2023-11-09 ENCOUNTER — PATIENT MESSAGE (OUTPATIENT)
Dept: OBSTETRICS AND GYNECOLOGY | Facility: CLINIC | Age: 33
End: 2023-11-09
Payer: COMMERCIAL

## 2023-11-13 ENCOUNTER — CLINICAL SUPPORT (OUTPATIENT)
Dept: OBSTETRICS AND GYNECOLOGY | Facility: CLINIC | Age: 33
End: 2023-11-13
Payer: COMMERCIAL

## 2023-11-13 DIAGNOSIS — N91.2 AMENORRHEA: Primary | ICD-10-CM

## 2023-11-21 ENCOUNTER — OFFICE VISIT (OUTPATIENT)
Dept: URGENT CARE | Facility: CLINIC | Age: 33
End: 2023-11-21
Payer: COMMERCIAL

## 2023-11-21 VITALS
HEART RATE: 101 BPM | BODY MASS INDEX: 31.39 KG/M2 | RESPIRATION RATE: 19 BRPM | TEMPERATURE: 98 F | OXYGEN SATURATION: 99 % | DIASTOLIC BLOOD PRESSURE: 87 MMHG | SYSTOLIC BLOOD PRESSURE: 124 MMHG | HEIGHT: 67 IN | WEIGHT: 200 LBS

## 2023-11-21 DIAGNOSIS — R11.0 NAUSEA IN ADULT: ICD-10-CM

## 2023-11-21 DIAGNOSIS — J06.9 VIRAL URI: Primary | ICD-10-CM

## 2023-11-21 DIAGNOSIS — R09.82 POSTNASAL DRIP: ICD-10-CM

## 2023-11-21 LAB
CTP QC/QA: YES
POC MOLECULAR INFLUENZA A AGN: NEGATIVE
POC MOLECULAR INFLUENZA B AGN: NEGATIVE

## 2023-11-21 PROCEDURE — 87502 POCT INFLUENZA A/B MOLECULAR: ICD-10-PCS | Mod: QW,S$GLB,, | Performed by: FAMILY MEDICINE

## 2023-11-21 PROCEDURE — 99213 PR OFFICE/OUTPT VISIT, EST, LEVL III, 20-29 MIN: ICD-10-PCS | Mod: S$GLB,,, | Performed by: FAMILY MEDICINE

## 2023-11-21 PROCEDURE — 87502 INFLUENZA DNA AMP PROBE: CPT | Mod: QW,S$GLB,, | Performed by: FAMILY MEDICINE

## 2023-11-21 PROCEDURE — 99213 OFFICE O/P EST LOW 20 MIN: CPT | Mod: S$GLB,,, | Performed by: FAMILY MEDICINE

## 2023-11-21 RX ORDER — FLUTICASONE PROPIONATE 50 MCG
2 SPRAY, SUSPENSION (ML) NASAL 2 TIMES DAILY
Qty: 9.9 ML | Refills: 0 | Status: SHIPPED | OUTPATIENT
Start: 2023-11-21 | End: 2023-11-28

## 2023-11-21 RX ORDER — ONDANSETRON 4 MG/1
4 TABLET, ORALLY DISINTEGRATING ORAL EVERY 8 HOURS PRN
Qty: 9 TABLET | Refills: 0 | Status: SHIPPED | OUTPATIENT
Start: 2023-11-21 | End: 2023-11-30

## 2023-11-21 NOTE — PROGRESS NOTES
"Subjective:      Patient ID: Roxanne Harmon is a 33 y.o. female.    Vitals:  height is 5' 7" (1.702 m) and weight is 90.7 kg (200 lb). Her oral temperature is 97.9 °F (36.6 °C). Her blood pressure is 124/87 and her pulse is 101. Her respiration is 19 and oxygen saturation is 99%.     Chief Complaint: Fever (Body aches, headache, covid negative at home - Entered by patient)    Negative home covid test this morning     Fever   This is a new problem. The current episode started yesterday. The problem occurs constantly. The problem has been unchanged. The maximum temperature noted was 100 to 100.9 F. The temperature was taken using an oral thermometer. Associated symptoms include congestion, headaches and muscle aches. Pertinent negatives include no chest pain, coughing, diarrhea, sore throat, urinary pain, vomiting or wheezing. She has tried acetaminophen for the symptoms. The treatment provided mild relief.   Risk factors: no sick contacts      Constitution: Positive for fever.   HENT:  Positive for congestion. Negative for sore throat.    Cardiovascular:  Negative for chest pain.   Respiratory:  Negative for cough and wheezing.    Gastrointestinal:  Negative for vomiting and diarrhea.   Genitourinary:  Negative for dysuria.   Neurological:  Positive for headaches.      Objective:     Vitals:    11/21/23 0951   BP: 124/87   BP Location: Left arm   Patient Position: Sitting   BP Method: Medium (Automatic)   Pulse: 101   Resp: 19   Temp: 97.9 °F (36.6 °C)   TempSrc: Oral   SpO2: 99%   Weight: 90.7 kg (200 lb)   Height: 5' 7" (1.702 m)      Physical Exam   Constitutional: She is oriented to person, place, and time.  Non-toxic appearance. She does not appear ill. No distress.   HENT:   Head: Atraumatic.   Ears:   Right Ear: Tympanic membrane normal.   Left Ear: Tympanic membrane normal.   Nose: Rhinorrhea and congestion present.   Mouth/Throat: Posterior oropharyngeal erythema present. No oropharyngeal exudate.   Eyes: " Conjunctivae are normal.   Cardiovascular: Normal rate, regular rhythm, normal heart sounds and normal pulses.   Pulmonary/Chest: Effort normal and breath sounds normal.   Abdominal: Bowel sounds are normal. She exhibits mass (gravid). There is no rebound.   Lymphadenopathy:     She has cervical adenopathy.   Neurological: She is alert and oriented to person, place, and time.   Skin: Skin is not diaphoretic.   Psychiatric: Judgment and thought content normal.     Results for orders placed or performed in visit on 11/21/23   POCT Influenza A/B MOLECULAR   Result Value Ref Range    POC Molecular Influenza A Ag Negative Negative, Not Reported    POC Molecular Influenza B Ag Negative Negative, Not Reported     Acceptable Yes       Assessment:     1. Viral URI    2. Postnasal drip    3. Nausea in adult        Plan:       Viral URI  -     POCT Influenza A/B MOLECULAR    2. Postnasal drip  -     fluticasone propionate (FLONASE) 50 mcg/actuation nasal spray; 2 sprays (100 mcg total) by Each Nostril route 2 (two) times a day. for 7 days  Dispense: 9.9 mL; Refill: 0    3. Nausea in adult  -     ondansetron (ZOFRAN-ODT) 4 MG TbDL; Take 1 tablet (4 mg total) by mouth every 8 (eight) hours as needed (nausea/ vomiting).  Dispense: 9 tablet; Refill: 0  Make sure to stay hydrated. Zofran only if nausea is intolerable.        Patient Instructions   Rest  Hydrate  Tylenol as needed  Flonase nasal spray  Zofran as needed    Seek immediate care in the emergency room in the event of severe abdominal pain, chest pain, respiratory distress, fever unresponsive to antipyretic, dehydration, loss of consciousness, seizure.

## 2023-11-21 NOTE — PATIENT INSTRUCTIONS
Rest  Hydrate  Tylenol as needed  Flonase nasal spray  Zofran as needed    Seek immediate care in the emergency room in the event of severe abdominal pain, chest pain, respiratory distress, fever unresponsive to antipyretic, dehydration, loss of consciousness, seizure.

## 2023-11-30 ENCOUNTER — OFFICE VISIT (OUTPATIENT)
Dept: OBSTETRICS AND GYNECOLOGY | Facility: CLINIC | Age: 33
End: 2023-11-30
Payer: COMMERCIAL

## 2023-11-30 ENCOUNTER — HOSPITAL ENCOUNTER (OUTPATIENT)
Dept: PERINATAL CARE | Facility: OTHER | Age: 33
Discharge: HOME | End: 2023-11-30
Attending: OBSTETRICS & GYNECOLOGY
Payer: COMMERCIAL

## 2023-11-30 VITALS — SYSTOLIC BLOOD PRESSURE: 104 MMHG | BODY MASS INDEX: 31.01 KG/M2 | WEIGHT: 198 LBS | DIASTOLIC BLOOD PRESSURE: 86 MMHG

## 2023-11-30 DIAGNOSIS — N91.2 AMENORRHEA: ICD-10-CM

## 2023-11-30 DIAGNOSIS — Z11.3 ENCOUNTER FOR SCREENING FOR INFECTIONS WITH PREDOMINANTLY SEXUAL MODE OF TRANSMISSION: ICD-10-CM

## 2023-11-30 DIAGNOSIS — Z34.01 ENCOUNTER FOR SUPERVISION OF NORMAL FIRST PREGNANCY IN FIRST TRIMESTER: ICD-10-CM

## 2023-11-30 DIAGNOSIS — N91.4 SECONDARY OLIGOMENORRHEA: Primary | ICD-10-CM

## 2023-11-30 DIAGNOSIS — Z32.01 POSITIVE PREGNANCY TEST: ICD-10-CM

## 2023-11-30 PROBLEM — Z67.91 RH NEGATIVE STATUS DURING PREGNANCY IN FIRST TRIMESTER: Status: ACTIVE | Noted: 2023-11-30

## 2023-11-30 PROBLEM — O26.891 RH NEGATIVE STATUS DURING PREGNANCY IN FIRST TRIMESTER: Status: ACTIVE | Noted: 2023-11-30

## 2023-11-30 PROCEDURE — 3074F SYST BP LT 130 MM HG: CPT | Mod: CPTII,S$GLB,, | Performed by: NURSE PRACTITIONER

## 2023-11-30 PROCEDURE — 3044F HG A1C LEVEL LT 7.0%: CPT | Mod: CPTII,S$GLB,, | Performed by: NURSE PRACTITIONER

## 2023-11-30 PROCEDURE — 99999 PR PBB SHADOW E&M-EST. PATIENT-LVL III: CPT | Mod: PBBFAC,,, | Performed by: NURSE PRACTITIONER

## 2023-11-30 PROCEDURE — 3079F DIAST BP 80-89 MM HG: CPT | Mod: CPTII,S$GLB,, | Performed by: NURSE PRACTITIONER

## 2023-11-30 PROCEDURE — 1159F MED LIST DOCD IN RCRD: CPT | Mod: CPTII,S$GLB,, | Performed by: NURSE PRACTITIONER

## 2023-11-30 PROCEDURE — 87491 CHLMYD TRACH DNA AMP PROBE: CPT | Performed by: NURSE PRACTITIONER

## 2023-11-30 PROCEDURE — 99214 OFFICE O/P EST MOD 30 MIN: CPT | Mod: S$GLB,,, | Performed by: NURSE PRACTITIONER

## 2023-11-30 PROCEDURE — 3074F PR MOST RECENT SYSTOLIC BLOOD PRESSURE < 130 MM HG: ICD-10-PCS | Mod: CPTII,S$GLB,, | Performed by: NURSE PRACTITIONER

## 2023-11-30 PROCEDURE — 87086 URINE CULTURE/COLONY COUNT: CPT | Performed by: NURSE PRACTITIONER

## 2023-11-30 PROCEDURE — 3044F PR MOST RECENT HEMOGLOBIN A1C LEVEL <7.0%: ICD-10-PCS | Mod: CPTII,S$GLB,, | Performed by: NURSE PRACTITIONER

## 2023-11-30 PROCEDURE — 76801 US OB/GYN PROCEDURE (VIEWPOINT): ICD-10-PCS | Mod: 26,,, | Performed by: OBSTETRICS & GYNECOLOGY

## 2023-11-30 PROCEDURE — 99214 PR OFFICE/OUTPT VISIT, EST, LEVL IV, 30-39 MIN: ICD-10-PCS | Mod: S$GLB,,, | Performed by: NURSE PRACTITIONER

## 2023-11-30 PROCEDURE — 76801 OB US < 14 WKS SINGLE FETUS: CPT

## 2023-11-30 PROCEDURE — 3008F BODY MASS INDEX DOCD: CPT | Mod: CPTII,S$GLB,, | Performed by: NURSE PRACTITIONER

## 2023-11-30 PROCEDURE — 76801 OB US < 14 WKS SINGLE FETUS: CPT | Mod: 26,,, | Performed by: OBSTETRICS & GYNECOLOGY

## 2023-11-30 PROCEDURE — 3079F PR MOST RECENT DIASTOLIC BLOOD PRESSURE 80-89 MM HG: ICD-10-PCS | Mod: CPTII,S$GLB,, | Performed by: NURSE PRACTITIONER

## 2023-11-30 PROCEDURE — 99999 PR PBB SHADOW E&M-EST. PATIENT-LVL III: ICD-10-PCS | Mod: PBBFAC,,, | Performed by: NURSE PRACTITIONER

## 2023-11-30 PROCEDURE — 1159F PR MEDICATION LIST DOCUMENTED IN MEDICAL RECORD: ICD-10-PCS | Mod: CPTII,S$GLB,, | Performed by: NURSE PRACTITIONER

## 2023-11-30 PROCEDURE — 3008F PR BODY MASS INDEX (BMI) DOCUMENTED: ICD-10-PCS | Mod: CPTII,S$GLB,, | Performed by: NURSE PRACTITIONER

## 2023-11-30 NOTE — PATIENT INSTRUCTIONS
LABOR AND DELIVERY PHONE NUMBER, [phone] (OPEN 24/7, LOCATED ON 6TH FLOOR OF HOSPITAL)  [address] PHONE NUMBER, [phone] (OPEN MON-FRI, 8a-5p)    1) Eat small frequent meals through the day versus three large meals  2) Try ginger ale or sprite to help settle the stomach   3) Eat crackers or dry toast before getting out of bed in the morning   4) Stay hydrated by drinking plenty of water-do not immediately eat or drink something after vomiting. Give your stomach a rest for 20-30 minutes. Slowly reintroduce ice chips, small sips water, crackers, etc.    5) Try OTC Unisom (use the tablets that have doxylamine not diphenhydramine in them, can use generic brands like Equate) and vitamin B6  (25 mg, can find on [employer]) together at night before bed. This can help with the nausea in the morning and is safe to use during pregnancy. You can also take the vitamin B6 alone, every 8 hours to help with the nausea.     If you are unable to keep anything down and constantly vomiting for more that 24 hours, let the office know so that dehydration can be avoided. We would recommend being seen in the emergency department if this is the case.     Call [phone] to see about coverage and any out of pocket costs regarding the [id number] (MT21) testing. This can be done as early as 9 weeks in most individuals and is blood work only. We recommend waiting until 10 weeks to ensure the most accuracy. This test checks for any chromosomal abnormalities like Down Syndrome. You can also find out the sex of the baby if you choose to know. Once you find out coverage and decide to proceed, send either myself or your doctor a message and we can see what date you can do it. It is done at our second floor lab at [facility].

## 2023-11-30 NOTE — PROGRESS NOTES
CC: Positive Pregnancy Test    HISTORY OF PRESENT ILLNESS:    Roxanne Harmon is a 33 y.o. female, ,  Presents today for a routine exam complaining of amenorrhea and positive home urine pregnancy test.  Patient's last menstrual period was 10/07/2023.  First pregnancy, here with her . Feels okay, some mild nausea and fatigue. No vomiting. Manageable for now without medication. Dating scan done, small KAMAR seen. No vaginal bleeding. Taking synthroid, oral progesterone and a prenatal. Desires aneuploidy screening but does not want to know gender. 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    Pregnancy   =========   [place] pregnancy. Number of embryos: 1     Dating   ======   LMP on: 10/7/2023   GA by LMP 7 w + 5 d   ANUSHKA by LMP: 2024   Ultrasound examination on: 2023   GA by U/S based upon: CRL   GA by U/S 7 w + 1 d   ANUSHKA by U/S: 2024   Assigned: based on ultrasound (CRL), selected on 2023   Assigned GA 7 w + 1 d   Assigned ANUSHKA: 2024     Assessment   ==========   Gestational sac: visualized   Location: intrauterine   Yolk sac: visualized   Embryo: visualized   CRL 10.4 mm 7w 1d [place]   Cardiac activity: present    bpm   Other: hypoechoic area adjacent to gest sac (9x12x8)mm     ROS:  GENERAL: No weight changes. No swelling. + fatigue. No fever.  CARDIOVASCULAR: No chest pain. No shortness of breath. No leg cramps.   NEUROLOGICAL: No headaches. No vision changes.  BREASTS: No pain. No lumps. No discharge.  ABDOMEN: No pain. No diarrhea. No constipation.  REPRODUCTIVE: No abnormal bleeding.   VULVA: No pain. No lesions. No itching.  VAGINA: No relaxation. No itching. No odor. No discharge. No lesions.  URINARY: No incontinence. No nocturia. No frequency. No dysuria.    MEDICATIONS AND ALLERGIES:  Reviewed    COMPREHENSIVE GYN HISTORY:  PAP History: Denies abnormal Paps.  Infection History: Denies STDs. Denies PID.  Benign History: Denies uterine fibroids. Denies  ovarian cysts. Denies endometriosis. Denies other conditions.  Cancer History: Denies cervical cancer. Denies uterine cancer or hyperplasia. Denies ovarian cancer. Denies vulvar cancer or pre-cancer. Denies vaginal cancer or pre-cancer. Denies breast cancer. Denies colon cancer.  Sexual Activity History: Reports currently being sexually active  Menstrual History: None.  Contraception: None    /86   Wt 89.8 kg (197 lb 15.6 oz)   LMP 10/07/2023   BMI 31.01 kg/m²     PE:  AFFECT: Calm, alert and oriented X 3. Interactive during exam  GENERAL: Appears well-nourished, well-developed, in no acute distress.  HEAD: Normocephalic, atruamatic  TEETH: Good dentition.  THYROID: No thyromegally   BREASTS: No masses, skin changes, nipple discharge or adenopathy bilaterally.  SKIN: Normal for race, warm, & dry. No lesions or rashes.  LUNGS: Easy and unlabored, clear to auscultation bilaterally.  HEART: Regular rate and rhythm   ABDOMEN: Soft and nontender without masses or organomegally.  VULVA: No lesions, masses or tenderness.  VAGINA: Moist and well rugated without lesions or discharge.  CERVIX: Moist and pink without lesions, discharge or tenderness.      UTERUS SIZE: 6-8 week size, nontender and without masses.  ADNEXA: No masses or tenderness.  ESTIMATE OF PELVIC CAPACITY: Adequate  EXTREMITIES: No cyanosis, clubbing or edema. No calf tenderness.  LYMPH NODES: No axillary or inguinal adenopathy.    PROCEDURES:  UPT Positive  Genprobe    ASSESSMENT/PLAN:  Amenorrhea  Positive urine pregnancy test   -  Routine prenatal care    Nausea and vomiting in pregnancy    -  Education regarding lifestyle and dietary modifications    -  Advised use of B6/Unisom. Pt will notify us if no relief/worsening symptoms, will consider Zofran if needed.    1st TRIMESTER COUNSELING: Discussed all, booklet provided:  Common complaints of pregnancy  HIV and other routine prenatal tests including  genetic screening  Risk factors  identified by prenatal history  Oriented to practice - discussed anticipated course of prenatal care & indications for Ultrasound  Childbirth classes/Hospital facilities   Nutrition and weight gain counseling  Toxoplasmosis precautions (Cats/Raw Meat)  Sexual activity and exercise  Environmental/Work hazards  Travel  Tobacco (Ask, Advise, Assess, Assist, and Arrange), as well as alcohol and drug use  Use of any medications (Including supplements, Vitamins, Herbs, or OTC Drugs)  Domestic violence  Seat belt use    TERATOLOGY COUNSELING:   Discussed indications and options for aneuploidy screening - pamphlets given    -  Pt desires NT scan    FOLLOW-UP in 4 weeks with Dr. Schumacher  IOROSE labs today  Pap current  GC and urine cx pending  Discussed NT scan to get US for KAMAR fu, will still do MT21 as well after 10 weeks  Start asa at 12 weeks    Latisha Harrell NP  OB/GYN

## 2023-12-01 LAB
C TRACH DNA SPEC QL NAA+PROBE: NOT DETECTED
N GONORRHOEA DNA SPEC QL NAA+PROBE: NOT DETECTED

## 2023-12-02 LAB — BACTERIA UR CULT: NO GROWTH

## 2023-12-26 ENCOUNTER — PATIENT MESSAGE (OUTPATIENT)
Dept: OBSTETRICS AND GYNECOLOGY | Facility: CLINIC | Age: 33
End: 2023-12-26
Payer: COMMERCIAL

## 2023-12-29 ENCOUNTER — TELEPHONE (OUTPATIENT)
Dept: OBSTETRICS AND GYNECOLOGY | Facility: CLINIC | Age: 33
End: 2023-12-29
Payer: COMMERCIAL

## 2023-12-29 NOTE — TELEPHONE ENCOUNTER
----- Message from Jitendra Garcia MA sent at 12/29/2023 12:05 PM CST -----  Regarding: Maternity T21  Check to see if Latisha's staff has completed paperwork:    ---- Message from Ama Werner sent at 12/29/2023 11:22 AM CST -----  Bhakti with [facility] calling in regards to clarify which maternity 21 test to draw for, she can be reached at [phone], says can you reach back out today with the info, otherwise pt would have to withdraw more blood.       H & P


Stated Complaint: FEVER X 1 DAY.  VERY FUSSY


Time Seen by Provider: 03/02/18 18:33


Source: Patient


Exam Limitations: No limitations





- Personal History


Current Tetanus/Diphtheria Vaccine: Yes


Current Tetanus Diphtheria and Acellular Pertussis (TDAP): Yes





- Medical/Surgical History


Hx Asthma: No


Hx Chronic Respiratory Disease: No


Hx Diabetes: No


Hx Cardiac Disease: No


Hx Renal Disease: No


Hx Cirrhosis: No


Hx Alcoholism: No


Hx HIV/AIDS: No


Hx Splenectomy or Spleen Trauma: No


Other PMH: DENIES


Constitutional: 





 Initial Vital Signs











Temperature (C)  37.1 C H  03/02/18 17:32


 


Heart Rate  114   03/02/18 17:32


 


Respiratory Rate  17 L  03/02/18 17:32


 


O2 Sat (%)  98   03/02/18 17:32








 











O2 Delivery Mode               Room Air














Allergies/Adverse Reactions: 


 





No Known Allergies Allergy (Unverified 08/04/16 08:47)


 








Home Medications: 














 Medication  Instructions  Recorded


 


NK [No Known Home Meds]  03/02/18














Medical Decision Making





- Data Points


Laboratory Results: 





 











  03/02/18





  19:12


 


Urine Color  YELLOW 





  


 


Urine Appearance  HAZY 





  


 


Urine pH  5.0 





   (5.0-7.5) 


 


Ur Specific Gravity  1.032  H 





   (1.002-1.030) 


 


Urine Protein  1+  H 





   (NEGATIVE) 


 


Urine Ketones  TRACE  H 





   (NEGATIVE) 


 


Urine Blood  NEGATIVE 





   (NEGATIVE) 


 


Urine Nitrate  NEGATIVE 





   (NEGATIVE) 


 


Urine Bilirubin  NEGATIVE 





   (NEGATIVE) 


 


Urine Urobilinogen  NEGATIVE EU EU





   (0.2-1.0) 


 


Ur Leukocyte Esterase  NEGATIVE 





   (NEGATIVE) 


 


Urine RBC  25-50 /hpf H /hpf





   (0-3) 


 


Urine WBC  5-10 /hpf H /hpf





   (0-3) 


 


Ur Epithelial Cells  NONE SEEN /lpf /lpf





   (NONE-1+) 


 


Urine Mucus  2+ /lpf H /lpf





   (NONE-1+) 


 


Urine Glucose  NEGATIVE 





   (NEGATIVE) 











Medications Given: 





 








Discontinued Medications





Acetaminophen (Tylenol 160mg/5ml Oral Liquid)  142.5 mg PO EDNOW ONE


   Stop: 03/02/18 19:21


   Last Admin: 03/02/18 19:24 Dose:  142.5 mg








Departure





- Departure


Referrals: 


Jenna Knox MD [Primary Care Provider] - As per Instructions

## 2023-12-29 NOTE — TELEPHONE ENCOUNTER
Spoke with Bhakti - [facility]. Confirmed MaterniT plus with [facility]  Confirmed with the nurse practitioner as well prior to call    Bhakti states the lab will be processed as discussed, also a fax will be sent to the office. Fax # provided

## 2023-12-29 NOTE — TELEPHONE ENCOUNTER
----- Message from Ama Werner sent at 12/29/2023 11:22 AM CST -----  Bhakti with [facility] calling in regards to clarify which maternity 21 test to draw for, she can be reached at [phone], says can you reach back out today with the info, otherwise pt would have to withdraw more blood.

## 2024-01-03 ENCOUNTER — PATIENT MESSAGE (OUTPATIENT)
Dept: OBSTETRICS AND GYNECOLOGY | Facility: CLINIC | Age: 34
End: 2024-01-03
Payer: COMMERCIAL

## 2024-01-09 ENCOUNTER — PROCEDURE VISIT (OUTPATIENT)
Dept: MATERNAL FETAL MEDICINE | Facility: CLINIC | Age: 34
End: 2024-01-09
Payer: COMMERCIAL

## 2024-01-09 DIAGNOSIS — Z36.89 ENCOUNTER FOR FETAL ANATOMIC SURVEY: Primary | ICD-10-CM

## 2024-01-09 DIAGNOSIS — Z32.01 POSITIVE PREGNANCY TEST: ICD-10-CM

## 2024-01-09 PROCEDURE — 76813 OB US NUCHAL MEAS 1 GEST: CPT | Mod: S$GLB,,, | Performed by: OBSTETRICS & GYNECOLOGY

## 2024-01-10 ENCOUNTER — ROUTINE PRENATAL (OUTPATIENT)
Dept: OBSTETRICS AND GYNECOLOGY | Facility: CLINIC | Age: 34
End: 2024-01-10
Payer: COMMERCIAL

## 2024-01-10 ENCOUNTER — PATIENT MESSAGE (OUTPATIENT)
Dept: OBSTETRICS AND GYNECOLOGY | Facility: CLINIC | Age: 34
End: 2024-01-10
Payer: COMMERCIAL

## 2024-01-10 ENCOUNTER — PATIENT MESSAGE (OUTPATIENT)
Dept: ADMINISTRATIVE | Facility: OTHER | Age: 34
End: 2024-01-10
Payer: COMMERCIAL

## 2024-01-10 VITALS
DIASTOLIC BLOOD PRESSURE: 64 MMHG | SYSTOLIC BLOOD PRESSURE: 118 MMHG | WEIGHT: 195.75 LBS | BODY MASS INDEX: 30.66 KG/M2

## 2024-01-10 DIAGNOSIS — Z34.00 SUPERVISION OF NORMAL FIRST PREGNANCY, ANTEPARTUM: Primary | ICD-10-CM

## 2024-01-10 PROCEDURE — 99999 PR PBB SHADOW E&M-EST. PATIENT-LVL II: CPT | Mod: PBBFAC,,, | Performed by: OBSTETRICS & GYNECOLOGY

## 2024-01-10 PROCEDURE — 0500F INITIAL PRENATAL CARE VISIT: CPT | Mod: CPTII,S$GLB,, | Performed by: OBSTETRICS & GYNECOLOGY

## 2024-01-10 NOTE — PROGRESS NOTES
Doing well. No complaints. Started baby aspirin. Normal NT/T21 to be redrawn today. AFP next visit. Connected MOM signup done. 1st trimester thyroid labs normal. Will draw each trimester.  Anatomy sono scheduled. Can stop progesterone. RTC 4 weeks.

## 2024-01-24 ENCOUNTER — PATIENT MESSAGE (OUTPATIENT)
Dept: OBSTETRICS AND GYNECOLOGY | Facility: CLINIC | Age: 34
End: 2024-01-24
Payer: COMMERCIAL

## 2024-01-31 ENCOUNTER — PATIENT MESSAGE (OUTPATIENT)
Dept: OTHER | Facility: OTHER | Age: 34
End: 2024-01-31
Payer: COMMERCIAL

## 2024-02-07 ENCOUNTER — PATIENT MESSAGE (OUTPATIENT)
Dept: OTHER | Facility: OTHER | Age: 34
End: 2024-02-07
Payer: COMMERCIAL

## 2024-02-08 ENCOUNTER — ROUTINE PRENATAL (OUTPATIENT)
Dept: OBSTETRICS AND GYNECOLOGY | Facility: CLINIC | Age: 34
End: 2024-02-08
Payer: COMMERCIAL

## 2024-02-08 ENCOUNTER — LAB VISIT (OUTPATIENT)
Dept: LAB | Facility: OTHER | Age: 34
End: 2024-02-08
Attending: OBSTETRICS & GYNECOLOGY
Payer: COMMERCIAL

## 2024-02-08 VITALS
BODY MASS INDEX: 30.77 KG/M2 | DIASTOLIC BLOOD PRESSURE: 85 MMHG | WEIGHT: 196.44 LBS | SYSTOLIC BLOOD PRESSURE: 107 MMHG | HEART RATE: 103 BPM

## 2024-02-08 DIAGNOSIS — Z34.00 SUPERVISION OF NORMAL FIRST PREGNANCY, ANTEPARTUM: ICD-10-CM

## 2024-02-08 DIAGNOSIS — Z36.9 ANTENATAL SCREENING ENCOUNTER: ICD-10-CM

## 2024-02-08 DIAGNOSIS — Z34.00 SUPERVISION OF NORMAL FIRST PREGNANCY, ANTEPARTUM: Primary | ICD-10-CM

## 2024-02-08 PROCEDURE — 0502F SUBSEQUENT PRENATAL CARE: CPT | Mod: CPTII,S$GLB,, | Performed by: OBSTETRICS & GYNECOLOGY

## 2024-02-08 PROCEDURE — 82105 ALPHA-FETOPROTEIN SERUM: CPT | Performed by: OBSTETRICS & GYNECOLOGY

## 2024-02-08 PROCEDURE — 36415 COLL VENOUS BLD VENIPUNCTURE: CPT | Performed by: OBSTETRICS & GYNECOLOGY

## 2024-02-08 PROCEDURE — 99999 PR PBB SHADOW E&M-EST. PATIENT-LVL III: CPT | Mod: PBBFAC,,, | Performed by: OBSTETRICS & GYNECOLOGY

## 2024-02-08 NOTE — PROGRESS NOTES
Doing well. No complaints. AFP today. DM screen, CBC, thyroid labs with next visit. Anatomy sono scheduled in 2 weeks. Rx breast pump given today. RTC @ 24 weeks.

## 2024-02-12 LAB
# FETUSES US: NORMAL
AFP INTERPRETATION: NORMAL
AFP MOM SERPL: 1.32
AFP SERPL-MCNC: 45 NG/ML
AFP SERPL-MCNC: NEGATIVE NG/ML
AGE AT DELIVERY: 32
GA (DAYS): 1 D
GA (WEEKS): 17 WK
GESTATIONAL AGE METHOD: NORMAL
IDDM PATIENT QL: NORMAL
SMOKING STATUS FTND: NO

## 2024-02-21 ENCOUNTER — PROCEDURE VISIT (OUTPATIENT)
Dept: MATERNAL FETAL MEDICINE | Facility: CLINIC | Age: 34
End: 2024-02-21
Payer: COMMERCIAL

## 2024-02-21 DIAGNOSIS — Z36.2 ENCOUNTER FOR FOLLOW-UP ULTRASOUND OF FETAL ANATOMY: Primary | ICD-10-CM

## 2024-02-21 DIAGNOSIS — Z36.89 ENCOUNTER FOR FETAL ANATOMIC SURVEY: ICD-10-CM

## 2024-02-21 PROCEDURE — 76805 OB US >/= 14 WKS SNGL FETUS: CPT | Mod: S$GLB,,, | Performed by: OBSTETRICS & GYNECOLOGY

## 2024-02-28 ENCOUNTER — PATIENT MESSAGE (OUTPATIENT)
Dept: OTHER | Facility: OTHER | Age: 34
End: 2024-02-28
Payer: COMMERCIAL

## 2024-03-04 ENCOUNTER — PATIENT MESSAGE (OUTPATIENT)
Dept: OBSTETRICS AND GYNECOLOGY | Facility: CLINIC | Age: 34
End: 2024-03-04
Payer: COMMERCIAL

## 2024-03-04 DIAGNOSIS — Z34.00 SUPERVISION OF NORMAL FIRST PREGNANCY, ANTEPARTUM: Primary | ICD-10-CM

## 2024-03-05 ENCOUNTER — LAB VISIT (OUTPATIENT)
Dept: LAB | Facility: OTHER | Age: 34
End: 2024-03-05
Attending: OBSTETRICS & GYNECOLOGY
Payer: COMMERCIAL

## 2024-03-05 DIAGNOSIS — Z34.00 SUPERVISION OF NORMAL FIRST PREGNANCY, ANTEPARTUM: ICD-10-CM

## 2024-03-05 PROCEDURE — 86747 PARVOVIRUS ANTIBODY: CPT | Performed by: OBSTETRICS & GYNECOLOGY

## 2024-03-05 PROCEDURE — 36415 COLL VENOUS BLD VENIPUNCTURE: CPT | Performed by: OBSTETRICS & GYNECOLOGY

## 2024-03-07 LAB
PARVOVIRUS B19 ABS IGG & IGM: ABNORMAL
PARVOVIRUS B19 IGG ANTIBODY: POSITIVE
PARVOVIRUS B19 IGM ANTIBODY: NEGATIVE

## 2024-03-13 ENCOUNTER — OFFICE VISIT (OUTPATIENT)
Dept: OPTOMETRY | Facility: CLINIC | Age: 34
End: 2024-03-13
Payer: COMMERCIAL

## 2024-03-13 DIAGNOSIS — H52.13 MYOPIA WITH ASTIGMATISM, BILATERAL: Primary | ICD-10-CM

## 2024-03-13 DIAGNOSIS — H10.89 GPC (GIANT PAPILLARY CONJUNCTIVITIS): ICD-10-CM

## 2024-03-13 DIAGNOSIS — Z46.0 FITTING AND ADJUSTMENT OF SPECTACLES AND CONTACT LENSES: Primary | ICD-10-CM

## 2024-03-13 DIAGNOSIS — H52.203 MYOPIA WITH ASTIGMATISM, BILATERAL: Primary | ICD-10-CM

## 2024-03-13 PROCEDURE — 92014 COMPRE OPH EXAM EST PT 1/>: CPT | Mod: S$GLB,,, | Performed by: OPTOMETRIST

## 2024-03-13 PROCEDURE — 92310 CONTACT LENS FITTING OU: CPT | Mod: CSM,S$GLB,, | Performed by: OPTOMETRIST

## 2024-03-13 PROCEDURE — 92015 DETERMINE REFRACTIVE STATE: CPT | Mod: S$GLB,,, | Performed by: OPTOMETRIST

## 2024-03-13 PROCEDURE — 99999 PR PBB SHADOW E&M-EST. PATIENT-LVL II: CPT | Mod: PBBFAC,,, | Performed by: OPTOMETRIST

## 2024-03-13 PROCEDURE — 99999 PR PBB SHADOW E&M-EST. PATIENT-LVL I: CPT | Mod: PBBFAC,,, | Performed by: OPTOMETRIST

## 2024-03-13 RX ORDER — INFLUENZA A VIRUS A/GEORGIA/12/2022 CVR-167 (H1N1) ANTIGEN (MDCK CELL DERIVED, PROPIOLACTONE INACTIVATED), INFLUENZA A VIRUS A/DARWIN/11/2021 (H3N2) ANTIGEN (MDCK CELL DERIVED, PROPIOLACTONE INACTIVATED),INFLUENZA B VIRUS B/SINGAPORE/WUH4618/2021 ANTIGEN (MDCK CELL DERIVED, PROPIOLACTONE INACTIVATED),INFLUENZA B VIRUS B/SINGAPORE/INFTT-16-0610/2016 ANTIGEN (MDCK CELL DERIVED, PROPIOLACTONE INACTIVATED) 15; 15; 15; 15 UG/.5ML; UG/.5ML; UG/.5ML; UG/.5ML
INJECTION, SUSPENSION INTRAMUSCULAR
COMMUNITY
Start: 2023-11-27 | End: 2024-05-23

## 2024-03-13 NOTE — PROGRESS NOTES
HPI    LEXIS: 03/23  Chief complaint (CC): Patient is here for annual eye exam today.  Patient   hasn't noticed any vision changes since the last exam. Glasses and   contacts still seem fine.  Glasses? +  Contacts? +  H/o eye surgery, injections or laser: -  H/o eye injury: -  Known eye conditions? See above  Family h/o eye conditions? -  Eye gtts? -      (-) Flashes (-)  Floaters (-) Mucous   (-)  Tearing (-) Itching (-) Burning   (-) Headaches (-) Eye Pain/discomfort (-) Irritation   (-)  Redness (-) Double vision (-) Blurry vision    Diabetic? -  A1c? -      Last edited by Flores Robles on 3/13/2024  3:36 PM.            Assessment /Plan     For exam results, see Encounter Report.    Myopia with astigmatism, bilateral    GPC (giant papillary conjunctivitis)      CLRx and SRx released to patient. Patient educated on lens options. Normal ocular health. RTC 1 year for routine exam.     Pt is 22 w pregnant

## 2024-03-14 ENCOUNTER — PATIENT MESSAGE (OUTPATIENT)
Dept: OPTOMETRY | Facility: CLINIC | Age: 34
End: 2024-03-14
Payer: COMMERCIAL

## 2024-03-27 ENCOUNTER — PATIENT MESSAGE (OUTPATIENT)
Dept: OTHER | Facility: OTHER | Age: 34
End: 2024-03-27
Payer: COMMERCIAL

## 2024-03-28 ENCOUNTER — LAB VISIT (OUTPATIENT)
Dept: LAB | Facility: OTHER | Age: 34
End: 2024-03-28
Attending: OBSTETRICS & GYNECOLOGY
Payer: COMMERCIAL

## 2024-03-28 ENCOUNTER — ROUTINE PRENATAL (OUTPATIENT)
Dept: OBSTETRICS AND GYNECOLOGY | Facility: CLINIC | Age: 34
End: 2024-03-28
Payer: COMMERCIAL

## 2024-03-28 ENCOUNTER — PROCEDURE VISIT (OUTPATIENT)
Dept: MATERNAL FETAL MEDICINE | Facility: CLINIC | Age: 34
End: 2024-03-28
Payer: COMMERCIAL

## 2024-03-28 VITALS — SYSTOLIC BLOOD PRESSURE: 116 MMHG | DIASTOLIC BLOOD PRESSURE: 64 MMHG | BODY MASS INDEX: 31.56 KG/M2 | WEIGHT: 201.5 LBS

## 2024-03-28 DIAGNOSIS — Z36.2 ENCOUNTER FOR FOLLOW-UP ULTRASOUND OF FETAL ANATOMY: ICD-10-CM

## 2024-03-28 DIAGNOSIS — Z34.00 SUPERVISION OF NORMAL FIRST PREGNANCY, ANTEPARTUM: Primary | ICD-10-CM

## 2024-03-28 DIAGNOSIS — O26.893 RH NEGATIVE STATE IN ANTEPARTUM PERIOD, THIRD TRIMESTER: ICD-10-CM

## 2024-03-28 DIAGNOSIS — Z23 NEED FOR TDAP VACCINATION: ICD-10-CM

## 2024-03-28 DIAGNOSIS — Z34.00 SUPERVISION OF NORMAL FIRST PREGNANCY, ANTEPARTUM: ICD-10-CM

## 2024-03-28 DIAGNOSIS — Z67.91 RH NEGATIVE STATE IN ANTEPARTUM PERIOD, THIRD TRIMESTER: ICD-10-CM

## 2024-03-28 LAB
BASOPHILS # BLD AUTO: 0.05 K/UL (ref 0–0.2)
BASOPHILS NFR BLD: 0.4 % (ref 0–1.9)
DIFFERENTIAL METHOD BLD: ABNORMAL
EOSINOPHIL # BLD AUTO: 0.1 K/UL (ref 0–0.5)
EOSINOPHIL NFR BLD: 0.6 % (ref 0–8)
ERYTHROCYTE [DISTWIDTH] IN BLOOD BY AUTOMATED COUNT: 13.2 % (ref 11.5–14.5)
GLUCOSE SERPL-MCNC: 125 MG/DL (ref 70–140)
HCT VFR BLD AUTO: 41.3 % (ref 37–48.5)
HGB BLD-MCNC: 13.7 G/DL (ref 12–16)
IMM GRANULOCYTES # BLD AUTO: 0.13 K/UL (ref 0–0.04)
IMM GRANULOCYTES NFR BLD AUTO: 1.1 % (ref 0–0.5)
LYMPHOCYTES # BLD AUTO: 2.2 K/UL (ref 1–4.8)
LYMPHOCYTES NFR BLD: 18.1 % (ref 18–48)
MCH RBC QN AUTO: 29.9 PG (ref 27–31)
MCHC RBC AUTO-ENTMCNC: 33.2 G/DL (ref 32–36)
MCV RBC AUTO: 90 FL (ref 82–98)
MONOCYTES # BLD AUTO: 0.6 K/UL (ref 0.3–1)
MONOCYTES NFR BLD: 4.9 % (ref 4–15)
NEUTROPHILS # BLD AUTO: 9.1 K/UL (ref 1.8–7.7)
NEUTROPHILS NFR BLD: 74.9 % (ref 38–73)
NRBC BLD-RTO: 0 /100 WBC
PLATELET # BLD AUTO: 261 K/UL (ref 150–450)
PMV BLD AUTO: 8.8 FL (ref 9.2–12.9)
RBC # BLD AUTO: 4.58 M/UL (ref 4–5.4)
T4 FREE SERPL-MCNC: 1.08 NG/DL (ref 0.71–1.51)
TSH SERPL DL<=0.005 MIU/L-ACNC: 1.3 UIU/ML (ref 0.4–4)
WBC # BLD AUTO: 12.11 K/UL (ref 3.9–12.7)

## 2024-03-28 PROCEDURE — 84439 ASSAY OF FREE THYROXINE: CPT | Performed by: OBSTETRICS & GYNECOLOGY

## 2024-03-28 PROCEDURE — 99999 PR PBB SHADOW E&M-EST. PATIENT-LVL II: CPT | Mod: PBBFAC,,, | Performed by: OBSTETRICS & GYNECOLOGY

## 2024-03-28 PROCEDURE — 36415 COLL VENOUS BLD VENIPUNCTURE: CPT | Performed by: OBSTETRICS & GYNECOLOGY

## 2024-03-28 PROCEDURE — 85025 COMPLETE CBC W/AUTO DIFF WBC: CPT | Performed by: OBSTETRICS & GYNECOLOGY

## 2024-03-28 PROCEDURE — 82950 GLUCOSE TEST: CPT | Performed by: OBSTETRICS & GYNECOLOGY

## 2024-03-28 PROCEDURE — 84443 ASSAY THYROID STIM HORMONE: CPT | Performed by: OBSTETRICS & GYNECOLOGY

## 2024-03-28 PROCEDURE — 0502F SUBSEQUENT PRENATAL CARE: CPT | Mod: CPTII,S$GLB,, | Performed by: OBSTETRICS & GYNECOLOGY

## 2024-03-28 PROCEDURE — 76816 OB US FOLLOW-UP PER FETUS: CPT | Mod: S$GLB,,, | Performed by: OBSTETRICS & GYNECOLOGY

## 2024-03-28 NOTE — PROGRESS NOTES
Pt doing well. No vaginal bleeding, no loss of fluid, positive fetal movement, no contractions. Bleeding/labor/rom/fmc precautions.     Follow up anatomy sono done today. DM screen, CBC and thyroid labs done today.     Tdap/Rhogam next visit.     RTC 4 weeks.

## 2024-04-10 ENCOUNTER — PATIENT MESSAGE (OUTPATIENT)
Dept: OTHER | Facility: OTHER | Age: 34
End: 2024-04-10
Payer: COMMERCIAL

## 2024-04-24 ENCOUNTER — PATIENT MESSAGE (OUTPATIENT)
Dept: OTHER | Facility: OTHER | Age: 34
End: 2024-04-24
Payer: COMMERCIAL

## 2024-04-25 ENCOUNTER — ROUTINE PRENATAL (OUTPATIENT)
Dept: OBSTETRICS AND GYNECOLOGY | Facility: CLINIC | Age: 34
End: 2024-04-25
Payer: COMMERCIAL

## 2024-04-25 ENCOUNTER — CLINICAL SUPPORT (OUTPATIENT)
Dept: OBSTETRICS AND GYNECOLOGY | Facility: CLINIC | Age: 34
End: 2024-04-25
Payer: COMMERCIAL

## 2024-04-25 VITALS
SYSTOLIC BLOOD PRESSURE: 114 MMHG | DIASTOLIC BLOOD PRESSURE: 60 MMHG | WEIGHT: 204.13 LBS | BODY MASS INDEX: 31.97 KG/M2

## 2024-04-25 DIAGNOSIS — O26.843 UTERINE SIZE-DATE DISCREPANCY IN THIRD TRIMESTER: ICD-10-CM

## 2024-04-25 DIAGNOSIS — Z67.91 RH NEGATIVE STATE IN ANTEPARTUM PERIOD, THIRD TRIMESTER: ICD-10-CM

## 2024-04-25 DIAGNOSIS — O26.893 RH NEGATIVE STATE IN ANTEPARTUM PERIOD, THIRD TRIMESTER: ICD-10-CM

## 2024-04-25 DIAGNOSIS — Z34.00 SUPERVISION OF NORMAL FIRST PREGNANCY, ANTEPARTUM: Primary | ICD-10-CM

## 2024-04-25 DIAGNOSIS — Z23 NEED FOR TDAP VACCINATION: Primary | ICD-10-CM

## 2024-04-25 PROCEDURE — 99999 PR PBB SHADOW E&M-EST. PATIENT-LVL II: CPT | Mod: PBBFAC,,, | Performed by: OBSTETRICS & GYNECOLOGY

## 2024-04-25 PROCEDURE — 96372 THER/PROPH/DIAG INJ SC/IM: CPT | Mod: S$GLB,,, | Performed by: OBSTETRICS & GYNECOLOGY

## 2024-04-25 PROCEDURE — 90471 IMMUNIZATION ADMIN: CPT | Mod: 59,S$GLB,, | Performed by: OBSTETRICS & GYNECOLOGY

## 2024-04-25 PROCEDURE — 90715 TDAP VACCINE 7 YRS/> IM: CPT | Mod: S$GLB,,, | Performed by: OBSTETRICS & GYNECOLOGY

## 2024-04-25 PROCEDURE — 0502F SUBSEQUENT PRENATAL CARE: CPT | Mod: CPTII,S$GLB,, | Performed by: OBSTETRICS & GYNECOLOGY

## 2024-04-25 PROCEDURE — 99999 PR PBB SHADOW E&M-EST. PATIENT-LVL I: CPT | Mod: PBBFAC,,,

## 2024-04-25 NOTE — PROGRESS NOTES
Pt doing well. No vaginal bleeding, no loss of fluid, positive fetal movement, no contractions. Bleeding/labor/rom/fmc precautions.     S>D; will send for growth sono.     Tdap/Rhogam done today. FMLA received today. Delivery consents next visit. RTC 4 weeks.

## 2024-04-25 NOTE — PROGRESS NOTES
Here for Tdap injection. Patient without complaint of pain at this time, injection given. Tolerated well no pain noted post injection advised to wait in lobby 15 minutes and report any adverse reactions.     Site:ld       Here for rhogam injection, no complaints at this time verified patient is Rh negative. No complaints of pain prior to or post injection patient advised to wait 15 minutes before leaving and to report and adverse reactions.      Site: rb

## 2024-05-08 ENCOUNTER — PATIENT MESSAGE (OUTPATIENT)
Dept: OTHER | Facility: OTHER | Age: 34
End: 2024-05-08
Payer: COMMERCIAL

## 2024-05-22 ENCOUNTER — PATIENT MESSAGE (OUTPATIENT)
Dept: OTHER | Facility: OTHER | Age: 34
End: 2024-05-22
Payer: COMMERCIAL

## 2024-05-23 ENCOUNTER — ROUTINE PRENATAL (OUTPATIENT)
Dept: OBSTETRICS AND GYNECOLOGY | Facility: CLINIC | Age: 34
End: 2024-05-23
Payer: COMMERCIAL

## 2024-05-23 ENCOUNTER — PROCEDURE VISIT (OUTPATIENT)
Dept: MATERNAL FETAL MEDICINE | Facility: CLINIC | Age: 34
End: 2024-05-23
Payer: COMMERCIAL

## 2024-05-23 VITALS
BODY MASS INDEX: 31.77 KG/M2 | SYSTOLIC BLOOD PRESSURE: 108 MMHG | WEIGHT: 202.81 LBS | DIASTOLIC BLOOD PRESSURE: 78 MMHG

## 2024-05-23 DIAGNOSIS — Z34.00 SUPERVISION OF NORMAL FIRST PREGNANCY, ANTEPARTUM: Primary | ICD-10-CM

## 2024-05-23 DIAGNOSIS — O26.843 UTERINE SIZE-DATE DISCREPANCY IN THIRD TRIMESTER: ICD-10-CM

## 2024-05-23 DIAGNOSIS — E03.8 OTHER SPECIFIED HYPOTHYROIDISM: ICD-10-CM

## 2024-05-23 PROCEDURE — 0502F SUBSEQUENT PRENATAL CARE: CPT | Mod: CPTII,S$GLB,, | Performed by: OBSTETRICS & GYNECOLOGY

## 2024-05-23 PROCEDURE — 76816 OB US FOLLOW-UP PER FETUS: CPT | Mod: S$GLB,,, | Performed by: OBSTETRICS & GYNECOLOGY

## 2024-05-23 PROCEDURE — 99999 PR PBB SHADOW E&M-EST. PATIENT-LVL III: CPT | Mod: PBBFAC,,, | Performed by: OBSTETRICS & GYNECOLOGY

## 2024-05-23 RX ORDER — PNV 119/IRON FUM/FOLIC ACID 29 MG-1 MG
TABLET ORAL
COMMUNITY

## 2024-05-23 NOTE — PROGRESS NOTES
Pt doing well. No vaginal bleeding, no loss of fluid, positive fetal movement, no contractions. Bleeding/labor/rom/fmc precautions.     Growth u/s today with MFM.     Delivery consents signed today. GBS, 3T labs next visit.     RTC 3 weeks.

## 2024-05-29 ENCOUNTER — OFFICE VISIT (OUTPATIENT)
Dept: DERMATOLOGY | Facility: CLINIC | Age: 34
End: 2024-05-29
Payer: COMMERCIAL

## 2024-05-29 DIAGNOSIS — D22.9 MULTIPLE BENIGN NEVI: ICD-10-CM

## 2024-05-29 DIAGNOSIS — Z12.83 SCREENING EXAM FOR SKIN CANCER: Primary | ICD-10-CM

## 2024-05-29 PROCEDURE — 99213 OFFICE O/P EST LOW 20 MIN: CPT | Mod: S$GLB,,, | Performed by: DERMATOLOGY

## 2024-05-29 PROCEDURE — 1159F MED LIST DOCD IN RCRD: CPT | Mod: CPTII,S$GLB,, | Performed by: DERMATOLOGY

## 2024-05-29 PROCEDURE — 1160F RVW MEDS BY RX/DR IN RCRD: CPT | Mod: CPTII,S$GLB,, | Performed by: DERMATOLOGY

## 2024-05-29 PROCEDURE — 99999 PR PBB SHADOW E&M-EST. PATIENT-LVL III: CPT | Mod: PBBFAC,,, | Performed by: DERMATOLOGY

## 2024-05-29 NOTE — PROGRESS NOTES
Subjective:      Patient ID:  Roxanne Harmon is a 34 y.o. female who presents for   Chief Complaint   Patient presents with    Skin Check     tbse     History of Present Illness: The patient presents for follow up of skin check.    The patient was last seen on: 5/30/2023 for tbse.     Other skin complaints: none          Review of Systems   Skin:  Positive for daily sunscreen use, activity-related sunscreen use and wears hat. Negative for recent sunburn.   Hematologic/Lymphatic: Does not bruise/bleed easily.       Objective:   Physical Exam   Constitutional: She appears well-developed and well-nourished.   Neurological: She is alert and oriented to person, place, and time.   Psychiatric: She has a normal mood and affect.   Skin:   Areas Examined (abnormalities noted in diagram):   Scalp / Hair Palpated and Inspected  Head / Face Inspection Performed  Neck Inspection Performed  Chest / Axilla Inspection Performed  Abdomen Inspection Performed  Genitals / Buttocks / Groin Inspection Performed  Back Inspection Performed  RUE Inspected  LUE Inspection Performed  RLE Inspected  LLE Inspection Performed  Nails and Digits Inspection Performed                 Diagram Legend     Erythematous scaling macule/papule c/w actinic keratosis       Vascular papule c/w angioma      Pigmented verrucoid papule/plaque c/w seborrheic keratosis      Yellow umbilicated papule c/w sebaceous hyperplasia      Irregularly shaped tan macule c/w lentigo     1-2 mm smooth white papules consistent with Milia      Movable subcutaneous cyst with punctum c/w epidermal inclusion cyst      Subcutaneous movable cyst c/w pilar cyst      Firm pink to brown papule c/w dermatofibroma      Pedunculated fleshy papule(s) c/w skin tag(s)      Evenly pigmented macule c/w junctional nevus     Mildly variegated pigmented, slightly irregular-bordered macule c/w mildly atypical nevus      Flesh colored to evenly pigmented papule c/w intradermal nevus       Pink  pearly papule/plaque c/w basal cell carcinoma      Erythematous hyperkeratotic cursted plaque c/w SCC      Surgical scar with no sign of skin cancer recurrence      Open and closed comedones      Inflammatory papules and pustules      Verrucoid papule consistent consistent with wart     Erythematous eczematous patches and plaques     Dystrophic onycholytic nail with subungual debris c/w onychomycosis     Umbilicated papule    Erythematous-base heme-crusted tan verrucoid plaque consistent with inflamed seborrheic keratosis     Erythematous Silvery Scaling Plaque c/w Psoriasis     See annotation      Assessment / Plan:        Screening exam for skin cancer  Total body skin examination performed today including at least 12 points as noted in physical examination. No lesions suspicious for malignancy noted.  Recommend daily sun protection/avoidance, use of at least SPF 30, broad spectrum sunscreen (OTC drug), skin self examinations, and routine physician surveillance to optimize early detection    Multiple benign nevi  Discussed ABCDE's of nevi.  Monitor for new mole or moles that are becoming bigger, darker, irritated, or developing irregular borders. Instructed patient to observe lesion(s) for changes and follow up in clinic if changes are noted. Patient to monitor skin at home for new or changing lesions.            No follow-ups on file.

## 2024-06-12 ENCOUNTER — PATIENT MESSAGE (OUTPATIENT)
Dept: OTHER | Facility: OTHER | Age: 34
End: 2024-06-12
Payer: COMMERCIAL

## 2024-06-13 ENCOUNTER — ROUTINE PRENATAL (OUTPATIENT)
Dept: OBSTETRICS AND GYNECOLOGY | Facility: CLINIC | Age: 34
End: 2024-06-13
Payer: COMMERCIAL

## 2024-06-13 ENCOUNTER — LAB VISIT (OUTPATIENT)
Dept: LAB | Facility: OTHER | Age: 34
End: 2024-06-13
Attending: OBSTETRICS & GYNECOLOGY
Payer: COMMERCIAL

## 2024-06-13 VITALS
BODY MASS INDEX: 32.11 KG/M2 | WEIGHT: 205 LBS | HEART RATE: 98 BPM | SYSTOLIC BLOOD PRESSURE: 110 MMHG | DIASTOLIC BLOOD PRESSURE: 83 MMHG

## 2024-06-13 DIAGNOSIS — E03.8 OTHER SPECIFIED HYPOTHYROIDISM: ICD-10-CM

## 2024-06-13 DIAGNOSIS — Z34.00 SUPERVISION OF NORMAL FIRST PREGNANCY, ANTEPARTUM: ICD-10-CM

## 2024-06-13 DIAGNOSIS — Z34.00 SUPERVISION OF NORMAL FIRST PREGNANCY, ANTEPARTUM: Primary | ICD-10-CM

## 2024-06-13 LAB
BASOPHILS # BLD AUTO: 0.03 K/UL (ref 0–0.2)
BASOPHILS NFR BLD: 0.3 % (ref 0–1.9)
DIFFERENTIAL METHOD BLD: ABNORMAL
EOSINOPHIL # BLD AUTO: 0.1 K/UL (ref 0–0.5)
EOSINOPHIL NFR BLD: 0.9 % (ref 0–8)
ERYTHROCYTE [DISTWIDTH] IN BLOOD BY AUTOMATED COUNT: 13.1 % (ref 11.5–14.5)
HCT VFR BLD AUTO: 41.6 % (ref 37–48.5)
HGB BLD-MCNC: 14.2 G/DL (ref 12–16)
HIV 1+2 AB+HIV1 P24 AG SERPL QL IA: NEGATIVE
IMM GRANULOCYTES # BLD AUTO: 0.14 K/UL (ref 0–0.04)
IMM GRANULOCYTES NFR BLD AUTO: 1.4 % (ref 0–0.5)
LYMPHOCYTES # BLD AUTO: 2.4 K/UL (ref 1–4.8)
LYMPHOCYTES NFR BLD: 22.8 % (ref 18–48)
MCH RBC QN AUTO: 30.5 PG (ref 27–31)
MCHC RBC AUTO-ENTMCNC: 34.1 G/DL (ref 32–36)
MCV RBC AUTO: 89 FL (ref 82–98)
MONOCYTES # BLD AUTO: 0.8 K/UL (ref 0.3–1)
MONOCYTES NFR BLD: 7.9 % (ref 4–15)
NEUTROPHILS # BLD AUTO: 6.9 K/UL (ref 1.8–7.7)
NEUTROPHILS NFR BLD: 66.7 % (ref 38–73)
NRBC BLD-RTO: 0 /100 WBC
PLATELET # BLD AUTO: 249 K/UL (ref 150–450)
PMV BLD AUTO: 9.6 FL (ref 9.2–12.9)
RBC # BLD AUTO: 4.66 M/UL (ref 4–5.4)
T4 FREE SERPL-MCNC: 0.98 NG/DL (ref 0.71–1.51)
TREPONEMA PALLIDUM IGG+IGM AB [PRESENCE] IN SERUM OR PLASMA BY IMMUNOASSAY: NONREACTIVE
TSH SERPL DL<=0.005 MIU/L-ACNC: 1.22 UIU/ML (ref 0.4–4)
WBC # BLD AUTO: 10.33 K/UL (ref 3.9–12.7)

## 2024-06-13 PROCEDURE — 84439 ASSAY OF FREE THYROXINE: CPT | Performed by: OBSTETRICS & GYNECOLOGY

## 2024-06-13 PROCEDURE — 85025 COMPLETE CBC W/AUTO DIFF WBC: CPT | Performed by: OBSTETRICS & GYNECOLOGY

## 2024-06-13 PROCEDURE — 99999 PR PBB SHADOW E&M-EST. PATIENT-LVL III: CPT | Mod: PBBFAC,,, | Performed by: OBSTETRICS & GYNECOLOGY

## 2024-06-13 PROCEDURE — 86593 SYPHILIS TEST NON-TREP QUANT: CPT | Performed by: OBSTETRICS & GYNECOLOGY

## 2024-06-13 PROCEDURE — 87081 CULTURE SCREEN ONLY: CPT | Performed by: OBSTETRICS & GYNECOLOGY

## 2024-06-13 PROCEDURE — 0502F SUBSEQUENT PRENATAL CARE: CPT | Mod: CPTII,S$GLB,, | Performed by: OBSTETRICS & GYNECOLOGY

## 2024-06-13 PROCEDURE — 87389 HIV-1 AG W/HIV-1&-2 AB AG IA: CPT | Performed by: OBSTETRICS & GYNECOLOGY

## 2024-06-13 PROCEDURE — 84443 ASSAY THYROID STIM HORMONE: CPT | Performed by: OBSTETRICS & GYNECOLOGY

## 2024-06-13 PROCEDURE — 36415 COLL VENOUS BLD VENIPUNCTURE: CPT | Performed by: OBSTETRICS & GYNECOLOGY

## 2024-06-13 NOTE — PROGRESS NOTES
Pt doing well. No vaginal bleeding, no loss of fluid, positive fetal movement, no contractions. Bleeding/labor/rom/fmc precautions.       GBS done today. 3T labs today.    SVE closed/60/-3, very soft. Good bloody show.     RTC 2 weeks.

## 2024-06-15 LAB — BACTERIA SPEC AEROBE CULT: NORMAL

## 2024-06-27 ENCOUNTER — ROUTINE PRENATAL (OUTPATIENT)
Dept: OBSTETRICS AND GYNECOLOGY | Facility: CLINIC | Age: 34
End: 2024-06-27
Payer: COMMERCIAL

## 2024-06-27 VITALS
BODY MASS INDEX: 32.25 KG/M2 | WEIGHT: 205.94 LBS | SYSTOLIC BLOOD PRESSURE: 118 MMHG | DIASTOLIC BLOOD PRESSURE: 60 MMHG

## 2024-06-27 DIAGNOSIS — Z34.00 SUPERVISION OF NORMAL FIRST PREGNANCY, ANTEPARTUM: Primary | ICD-10-CM

## 2024-06-27 PROCEDURE — 99999 PR PBB SHADOW E&M-EST. PATIENT-LVL II: CPT | Mod: PBBFAC,,, | Performed by: OBSTETRICS & GYNECOLOGY

## 2024-06-27 PROCEDURE — 0502F SUBSEQUENT PRENATAL CARE: CPT | Mod: CPTII,S$GLB,, | Performed by: OBSTETRICS & GYNECOLOGY

## 2024-06-27 NOTE — PROGRESS NOTES
Pt doing well. No vaginal bleeding, no loss of fluid, positive fetal movement, (+) irregular contractions. Bleeding/labor/rom/fmc precautions.     GBS negative. IOL 7/16 at [facility].     SVE 1/60/-4, soft

## 2024-07-03 ENCOUNTER — ROUTINE PRENATAL (OUTPATIENT)
Dept: OBSTETRICS AND GYNECOLOGY | Facility: CLINIC | Age: 34
End: 2024-07-03
Payer: COMMERCIAL

## 2024-07-03 VITALS
BODY MASS INDEX: 32.77 KG/M2 | SYSTOLIC BLOOD PRESSURE: 102 MMHG | DIASTOLIC BLOOD PRESSURE: 70 MMHG | WEIGHT: 209.19 LBS

## 2024-07-03 DIAGNOSIS — Z34.00 SUPERVISION OF NORMAL FIRST PREGNANCY, ANTEPARTUM: ICD-10-CM

## 2024-07-03 DIAGNOSIS — Z3A.38 38 WEEKS GESTATION OF PREGNANCY: Primary | ICD-10-CM

## 2024-07-03 PROCEDURE — 0502F SUBSEQUENT PRENATAL CARE: CPT | Mod: CPTII,S$GLB,, | Performed by: FAMILY MEDICINE

## 2024-07-03 PROCEDURE — 99999 PR PBB SHADOW E&M-EST. PATIENT-LVL III: CPT | Mod: PBBFAC,,, | Performed by: FAMILY MEDICINE

## 2024-07-03 NOTE — PROGRESS NOTES
Here for routine OB appt at 38w0d, with no complaints.  Reports good FM.  Denies LOF, denies VB, denies regular painful contractions- some BHC. No HAs. 3T labs and GBS done. TSH normal.   Reviewed warning signs of Labor and Preeclampsia.  Daily FM counts reinforced.  F/U scheduled 1 week

## 2024-07-03 NOTE — PATIENT INSTRUCTIONS
LABOR AND DELIVERY PHONE NUMBER, [phone] (OPEN 24/7, LOCATED ON 6TH FLOOR OF HOSPITAL)  [address] PHONE NUMBER, [phone] (OPEN MON-FRI, 8a-5p)

## 2024-07-09 ENCOUNTER — ROUTINE PRENATAL (OUTPATIENT)
Dept: OBSTETRICS AND GYNECOLOGY | Facility: CLINIC | Age: 34
End: 2024-07-09
Payer: COMMERCIAL

## 2024-07-09 VITALS — DIASTOLIC BLOOD PRESSURE: 70 MMHG | WEIGHT: 208.13 LBS | SYSTOLIC BLOOD PRESSURE: 106 MMHG | BODY MASS INDEX: 32.6 KG/M2

## 2024-07-09 DIAGNOSIS — O36.8130 DECREASED FETAL MOVEMENT AFFECTING MANAGEMENT OF PREGNANCY IN THIRD TRIMESTER, SINGLE OR UNSPECIFIED FETUS: ICD-10-CM

## 2024-07-09 DIAGNOSIS — Z34.00 SUPERVISION OF NORMAL FIRST PREGNANCY, ANTEPARTUM: Primary | ICD-10-CM

## 2024-07-09 PROCEDURE — 99999 PR PBB SHADOW E&M-EST. PATIENT-LVL II: CPT | Mod: PBBFAC,,, | Performed by: OBSTETRICS & GYNECOLOGY

## 2024-07-09 PROCEDURE — 0502F SUBSEQUENT PRENATAL CARE: CPT | Mod: CPTII,S$GLB,, | Performed by: OBSTETRICS & GYNECOLOGY

## 2024-07-09 NOTE — PROGRESS NOTES
Pt doing well. No vaginal bleeding, no loss of fluid, positive fetal movement, no contractions. Bleeding/labor/rom/fmc precautions.     SVE 1/70/-4, extremely soft with large amount of bloody show.     IOL 7/16 @ [facility]. Will place outpatient roper balloon at 1:00 pm with PNT to follow.     RTC 1 week.

## 2024-07-15 ENCOUNTER — HOSPITAL ENCOUNTER (OUTPATIENT)
Dept: PERINATAL CARE | Facility: OTHER | Age: 34
Discharge: HOME | End: 2024-07-15
Attending: OBSTETRICS & GYNECOLOGY
Payer: COMMERCIAL

## 2024-07-15 ENCOUNTER — ROUTINE PRENATAL (OUTPATIENT)
Dept: OBSTETRICS AND GYNECOLOGY | Facility: CLINIC | Age: 34
End: 2024-07-15
Payer: COMMERCIAL

## 2024-07-15 ENCOUNTER — PATIENT MESSAGE (OUTPATIENT)
Dept: OBSTETRICS AND GYNECOLOGY | Facility: OTHER | Age: 34
End: 2024-07-15
Payer: COMMERCIAL

## 2024-07-15 ENCOUNTER — TELEPHONE (OUTPATIENT)
Dept: OBSTETRICS AND GYNECOLOGY | Facility: OTHER | Age: 34
End: 2024-07-15
Payer: COMMERCIAL

## 2024-07-15 VITALS
BODY MASS INDEX: 33.01 KG/M2 | DIASTOLIC BLOOD PRESSURE: 94 MMHG | WEIGHT: 210.75 LBS | SYSTOLIC BLOOD PRESSURE: 122 MMHG

## 2024-07-15 DIAGNOSIS — Z34.00 SUPERVISION OF NORMAL FIRST PREGNANCY, ANTEPARTUM: Primary | ICD-10-CM

## 2024-07-15 DIAGNOSIS — Z34.00 SUPERVISION OF NORMAL FIRST PREGNANCY, ANTEPARTUM: ICD-10-CM

## 2024-07-15 DIAGNOSIS — O36.8130 DECREASED FETAL MOVEMENT AFFECTING MANAGEMENT OF PREGNANCY IN THIRD TRIMESTER, SINGLE OR UNSPECIFIED FETUS: ICD-10-CM

## 2024-07-15 PROCEDURE — 59200 INSERT CERVICAL DILATOR: CPT | Mod: 59,S$GLB,, | Performed by: OBSTETRICS & GYNECOLOGY

## 2024-07-15 PROCEDURE — 99999 PR PBB SHADOW E&M-EST. PATIENT-LVL I: CPT | Mod: PBBFAC,,, | Performed by: OBSTETRICS & GYNECOLOGY

## 2024-07-15 PROCEDURE — 59025 FETAL NON-STRESS TEST: CPT | Mod: 26,,, | Performed by: OBSTETRICS & GYNECOLOGY

## 2024-07-15 PROCEDURE — 0502F SUBSEQUENT PRENATAL CARE: CPT | Mod: ,,, | Performed by: OBSTETRICS & GYNECOLOGY

## 2024-07-15 PROCEDURE — 59025 FETAL NON-STRESS TEST: CPT

## 2024-07-15 NOTE — PROGRESS NOTES
34 yr old  weeks presents for outpatient cervical ripening. She reports good fetal movement and denies contractions, loss of fluid or vaginal bleeding.      Risks and benefits of outpatient cervical ripening discussed with patient and delivery consents previously signed. All questions answered.    Risks of outpatient cervical ripening include:  Vaginal bleeding  Vaginal and Abdominal pain  Infection  Premature Rupture of Membranes     SVE performed prior to balloon insertion. /-4. VERTEX presentation confirmed via V scan prior to insertion of balloon. Bauer balloon inserted without difficulty. (+) good amount of bloody show with cervical check.     Plan: Patient to report to Prenatal Testing for NST. If reassuring NST, patient will be discharged home with plans to return at scheduled induction time.    Sita Schumacher

## 2024-07-16 ENCOUNTER — ANESTHESIA (OUTPATIENT)
Dept: OBSTETRICS AND GYNECOLOGY | Facility: OTHER | Age: 34
End: 2024-07-16
Payer: COMMERCIAL

## 2024-07-16 ENCOUNTER — HOSPITAL ENCOUNTER (INPATIENT)
Facility: OTHER | Age: 34
LOS: 3 days | Discharge: HOME | End: 2024-07-19
Attending: OBSTETRICS & GYNECOLOGY | Admitting: OBSTETRICS & GYNECOLOGY
Payer: COMMERCIAL

## 2024-07-16 ENCOUNTER — ANESTHESIA EVENT (OUTPATIENT)
Dept: OBSTETRICS AND GYNECOLOGY | Facility: OTHER | Age: 34
End: 2024-07-16
Payer: COMMERCIAL

## 2024-07-16 DIAGNOSIS — Z34.00 SUPERVISION OF NORMAL FIRST PREGNANCY, ANTEPARTUM: ICD-10-CM

## 2024-07-16 DIAGNOSIS — Z98.891 S/P PRIMARY LOW TRANSVERSE C-SECTION: Primary | ICD-10-CM

## 2024-07-16 DIAGNOSIS — Z34.90 ENCOUNTER FOR INDUCTION OF LABOR: ICD-10-CM

## 2024-07-16 PROBLEM — O26.893 RH NEGATIVE STATUS DURING PREGNANCY IN THIRD TRIMESTER: Status: ACTIVE | Noted: 2024-07-16

## 2024-07-16 PROBLEM — Z67.91 RH NEGATIVE STATUS DURING PREGNANCY IN THIRD TRIMESTER: Status: ACTIVE | Noted: 2024-07-16

## 2024-07-16 PROBLEM — E03.9 HYPOTHYROIDISM: Status: ACTIVE | Noted: 2024-07-16

## 2024-07-16 LAB
ABO + RH BLD: NORMAL
BASOPHILS # BLD AUTO: 0.04 K/UL (ref 0–0.2)
BASOPHILS NFR BLD: 0.4 % (ref 0–1.9)
BLD GP AB SCN CELLS X3 SERPL QL: NORMAL
DIFFERENTIAL METHOD BLD: ABNORMAL
EOSINOPHIL # BLD AUTO: 0 K/UL (ref 0–0.5)
EOSINOPHIL NFR BLD: 0.4 % (ref 0–8)
ERYTHROCYTE [DISTWIDTH] IN BLOOD BY AUTOMATED COUNT: 13.2 % (ref 11.5–14.5)
HCT VFR BLD AUTO: 41.9 % (ref 37–48.5)
HGB BLD-MCNC: 14.3 G/DL (ref 12–16)
IMM GRANULOCYTES # BLD AUTO: 0.1 K/UL (ref 0–0.04)
IMM GRANULOCYTES NFR BLD AUTO: 0.9 % (ref 0–0.5)
LYMPHOCYTES # BLD AUTO: 2.8 K/UL (ref 1–4.8)
LYMPHOCYTES NFR BLD: 25.3 % (ref 18–48)
MCH RBC QN AUTO: 31 PG (ref 27–31)
MCHC RBC AUTO-ENTMCNC: 34.1 G/DL (ref 32–36)
MCV RBC AUTO: 91 FL (ref 82–98)
MONOCYTES # BLD AUTO: 0.8 K/UL (ref 0.3–1)
MONOCYTES NFR BLD: 7.3 % (ref 4–15)
NEUTROPHILS # BLD AUTO: 7.3 K/UL (ref 1.8–7.7)
NEUTROPHILS NFR BLD: 65.7 % (ref 38–73)
NRBC BLD-RTO: 0 /100 WBC
PLATELET # BLD AUTO: 249 K/UL (ref 150–450)
PMV BLD AUTO: 9.7 FL (ref 9.2–12.9)
RBC # BLD AUTO: 4.62 M/UL (ref 4–5.4)
TREPONEMA PALLIDUM IGG+IGM AB [PRESENCE] IN SERUM OR PLASMA BY IMMUNOASSAY: NONREACTIVE
WBC # BLD AUTO: 11.13 K/UL (ref 3.9–12.7)

## 2024-07-16 PROCEDURE — 51702 INSERT TEMP BLADDER CATH: CPT

## 2024-07-16 PROCEDURE — 71000039 HC RECOVERY, EACH ADD'L HOUR: Performed by: OBSTETRICS & GYNECOLOGY

## 2024-07-16 PROCEDURE — 71000033 HC RECOVERY, INTIAL HOUR: Performed by: OBSTETRICS & GYNECOLOGY

## 2024-07-16 PROCEDURE — 36004725 HC OB OR TIME LEV III - EA ADD 15 MIN: Performed by: OBSTETRICS & GYNECOLOGY

## 2024-07-16 PROCEDURE — 63600175 PHARM REV CODE 636 W HCPCS: Mod: JZ,JG | Performed by: OBSTETRICS & GYNECOLOGY

## 2024-07-16 PROCEDURE — 25000003 PHARM REV CODE 250

## 2024-07-16 PROCEDURE — 25000003 PHARM REV CODE 250: Performed by: OBSTETRICS & GYNECOLOGY

## 2024-07-16 PROCEDURE — C1751 CATH, INF, PER/CENT/MIDLINE: HCPCS

## 2024-07-16 PROCEDURE — 01968 ANES/ANALG CS DLVR NEURAXIAL: CPT | Mod: ,,, | Performed by: ANESTHESIOLOGY

## 2024-07-16 PROCEDURE — 86900 BLOOD TYPING SEROLOGIC ABO: CPT

## 2024-07-16 PROCEDURE — 27200710 HC EPIDURAL INFUSION PUMP SET

## 2024-07-16 PROCEDURE — 86901 BLOOD TYPING SEROLOGIC RH(D): CPT

## 2024-07-16 PROCEDURE — 36004724 HC OB OR TIME LEV III - 1ST 15 MIN: Performed by: OBSTETRICS & GYNECOLOGY

## 2024-07-16 PROCEDURE — 63600175 PHARM REV CODE 636 W HCPCS

## 2024-07-16 PROCEDURE — 72100002 HC LABOR CARE, 1ST 8 HOURS

## 2024-07-16 PROCEDURE — 59514 CESAREAN DELIVERY ONLY: CPT | Mod: ,,, | Performed by: ANESTHESIOLOGY

## 2024-07-16 PROCEDURE — 59510 CESAREAN DELIVERY: CPT | Mod: GB,,, | Performed by: OBSTETRICS & GYNECOLOGY

## 2024-07-16 PROCEDURE — 37000009 HC ANESTHESIA EA ADD 15 MINS: Performed by: OBSTETRICS & GYNECOLOGY

## 2024-07-16 PROCEDURE — 11000001 HC ACUTE MED/SURG PRIVATE ROOM

## 2024-07-16 PROCEDURE — 37000008 HC ANESTHESIA 1ST 15 MINUTES: Performed by: OBSTETRICS & GYNECOLOGY

## 2024-07-16 PROCEDURE — 62326 NJX INTERLAMINAR LMBR/SAC: CPT

## 2024-07-16 PROCEDURE — 86593 SYPHILIS TEST NON-TREP QUANT: CPT

## 2024-07-16 PROCEDURE — 10907ZC DRAINAGE OF AMNIOTIC FLUID, THERAPEUTIC FROM PRODUCTS OF CONCEPTION, VIA NATURAL OR ARTIFICIAL OPENING: ICD-10-PCS | Performed by: OBSTETRICS & GYNECOLOGY

## 2024-07-16 PROCEDURE — 85025 COMPLETE CBC W/AUTO DIFF WBC: CPT

## 2024-07-16 RX ORDER — LIDOCAINE HYDROCHLORIDE 10 MG/ML
10 INJECTION INFILTRATION; PERINEURAL ONCE AS NEEDED
Status: DISCONTINUED | OUTPATIENT
Start: 2024-07-16 | End: 2024-07-16

## 2024-07-16 RX ORDER — ACETAMINOPHEN 500 MG
TABLET ORAL
Status: DISPENSED
Start: 2024-07-16 | End: 2024-07-17

## 2024-07-16 RX ORDER — ACETAMINOPHEN 500 MG
1000 TABLET ORAL ONCE
Status: COMPLETED | OUTPATIENT
Start: 2024-07-16 | End: 2024-07-16

## 2024-07-16 RX ORDER — AMPICILLIN 2 G/1
INJECTION, POWDER, FOR SOLUTION INTRAVENOUS
Status: DISPENSED
Start: 2024-07-16 | End: 2024-07-17

## 2024-07-16 RX ORDER — OXYTOCIN-SODIUM CHLORIDE 0.9% IV SOLN 30 UNIT/500ML 30-0.9/5 UT/ML-%
0-32 SOLUTION INTRAVENOUS CONTINUOUS
Status: DISCONTINUED | OUTPATIENT
Start: 2024-07-16 | End: 2024-07-16

## 2024-07-16 RX ORDER — DIPHENOXYLATE HYDROCHLORIDE AND ATROPINE SULFATE 2.5; .025 MG/1; MG/1
2 TABLET ORAL EVERY 6 HOURS PRN
Status: DISCONTINUED | OUTPATIENT
Start: 2024-07-16 | End: 2024-07-16

## 2024-07-16 RX ORDER — ONDANSETRON HYDROCHLORIDE 2 MG/ML
4 INJECTION, SOLUTION INTRAVENOUS ONCE
Status: COMPLETED | OUTPATIENT
Start: 2024-07-16 | End: 2024-07-16

## 2024-07-16 RX ORDER — FENTANYL CITRATE 50 UG/ML
INJECTION, SOLUTION INTRAMUSCULAR; INTRAVENOUS
Status: DISCONTINUED | OUTPATIENT
Start: 2024-07-16 | End: 2024-07-16

## 2024-07-16 RX ORDER — ADHESIVE BANDAGE
30 BANDAGE TOPICAL 2 TIMES DAILY PRN
Status: DISCONTINUED | OUTPATIENT
Start: 2024-07-17 | End: 2024-07-19 | Stop reason: HOSPADM

## 2024-07-16 RX ORDER — METHYLERGONOVINE MALEATE 0.2 MG/ML
200 INJECTION INTRAVENOUS ONCE AS NEEDED
Status: DISCONTINUED | OUTPATIENT
Start: 2024-07-16 | End: 2024-07-19 | Stop reason: HOSPADM

## 2024-07-16 RX ORDER — ONDANSETRON 8 MG/1
8 TABLET, ORALLY DISINTEGRATING ORAL EVERY 8 HOURS PRN
Status: DISCONTINUED | OUTPATIENT
Start: 2024-07-16 | End: 2024-07-16

## 2024-07-16 RX ORDER — LEVOTHYROXINE SODIUM 25 UG/1
75 TABLET ORAL
Status: DISCONTINUED | OUTPATIENT
Start: 2024-07-16 | End: 2024-07-19 | Stop reason: HOSPADM

## 2024-07-16 RX ORDER — BISACODYL 10 MG/1
10 SUPPOSITORY RECTAL ONCE AS NEEDED
Status: DISCONTINUED | OUTPATIENT
Start: 2024-07-16 | End: 2024-07-19 | Stop reason: HOSPADM

## 2024-07-16 RX ORDER — MORPHINE SULFATE 0.5 MG/ML
INJECTION, SOLUTION EPIDURAL; INTRATHECAL; INTRAVENOUS
Status: DISCONTINUED | OUTPATIENT
Start: 2024-07-16 | End: 2024-07-16

## 2024-07-16 RX ORDER — OXYTOCIN-SODIUM CHLORIDE 0.9% IV SOLN 30 UNIT/500ML 30-0.9/5 UT/ML-%
10 SOLUTION INTRAVENOUS ONCE AS NEEDED
Status: DISCONTINUED | OUTPATIENT
Start: 2024-07-16 | End: 2024-07-16

## 2024-07-16 RX ORDER — FAMOTIDINE 10 MG/ML
20 INJECTION INTRAVENOUS ONCE
Status: COMPLETED | OUTPATIENT
Start: 2024-07-16 | End: 2024-07-16

## 2024-07-16 RX ORDER — CARBOPROST TROMETHAMINE 250 UG/ML
250 INJECTION, SOLUTION INTRAMUSCULAR
Status: DISCONTINUED | OUTPATIENT
Start: 2024-07-16 | End: 2024-07-19 | Stop reason: HOSPADM

## 2024-07-16 RX ORDER — FENTANYL/BUPIVACAINE/NS/PF 2MCG/ML-.1
PLASTIC BAG, INJECTION (ML) INJECTION
Status: COMPLETED
Start: 2024-07-16 | End: 2024-07-16

## 2024-07-16 RX ORDER — OXYTOCIN-SODIUM CHLORIDE 0.9% IV SOLN 30 UNIT/500ML 30-0.9/5 UT/ML-%
95 SOLUTION INTRAVENOUS ONCE AS NEEDED
Status: DISCONTINUED | OUTPATIENT
Start: 2024-07-16 | End: 2024-07-19 | Stop reason: HOSPADM

## 2024-07-16 RX ORDER — ENOXAPARIN SODIUM 100 MG/ML
40 INJECTION SUBCUTANEOUS EVERY 24 HOURS
Status: DISCONTINUED | OUTPATIENT
Start: 2024-07-17 | End: 2024-07-19 | Stop reason: HOSPADM

## 2024-07-16 RX ORDER — AZITHROMYCIN 100 MG/ML
INJECTION, POWDER, LYOPHILIZED, FOR SOLUTION INTRAVENOUS
Status: DISPENSED
Start: 2024-07-16 | End: 2024-07-17

## 2024-07-16 RX ORDER — OXYTOCIN-SODIUM CHLORIDE 0.9% IV SOLN 30 UNIT/500ML 30-0.9/5 UT/ML-%
95 SOLUTION INTRAVENOUS ONCE
Status: DISCONTINUED | OUTPATIENT
Start: 2024-07-16 | End: 2024-07-19 | Stop reason: HOSPADM

## 2024-07-16 RX ORDER — MISOPROSTOL 200 UG/1
800 TABLET ORAL ONCE AS NEEDED
Status: DISCONTINUED | OUTPATIENT
Start: 2024-07-16 | End: 2024-07-19 | Stop reason: HOSPADM

## 2024-07-16 RX ORDER — OXYTOCIN-SODIUM CHLORIDE 0.9% IV SOLN 30 UNIT/500ML 30-0.9/5 UT/ML-%
10 SOLUTION INTRAVENOUS ONCE AS NEEDED
Status: DISCONTINUED | OUTPATIENT
Start: 2024-07-16 | End: 2024-07-19 | Stop reason: HOSPADM

## 2024-07-16 RX ORDER — SODIUM CHLORIDE 0.9 % (FLUSH) 0.9 %
10 SYRINGE (ML) INJECTION
Status: DISCONTINUED | OUTPATIENT
Start: 2024-07-16 | End: 2024-07-19 | Stop reason: HOSPADM

## 2024-07-16 RX ORDER — KETOROLAC TROMETHAMINE 30 MG/ML
30 INJECTION, SOLUTION INTRAMUSCULAR; INTRAVENOUS EVERY 6 HOURS
Status: COMPLETED | OUTPATIENT
Start: 2024-07-17 | End: 2024-07-17

## 2024-07-16 RX ORDER — ONDANSETRON HYDROCHLORIDE 2 MG/ML
INJECTION, SOLUTION INTRAVENOUS
Status: COMPLETED
Start: 2024-07-16 | End: 2024-07-16

## 2024-07-16 RX ORDER — CLINDAMYCIN PHOSPHATE 900 MG/50ML
900 INJECTION, SOLUTION INTRAVENOUS
Status: DISCONTINUED | OUTPATIENT
Start: 2024-07-16 | End: 2024-07-17

## 2024-07-16 RX ORDER — MISOPROSTOL 200 UG/1
800 TABLET ORAL ONCE AS NEEDED
Status: DISCONTINUED | OUTPATIENT
Start: 2024-07-16 | End: 2024-07-16

## 2024-07-16 RX ORDER — OXYTOCIN 10 [USP'U]/ML
10 INJECTION, SOLUTION INTRAMUSCULAR; INTRAVENOUS ONCE AS NEEDED
Status: DISCONTINUED | OUTPATIENT
Start: 2024-07-16 | End: 2024-07-16

## 2024-07-16 RX ORDER — OXYTOCIN 10 [USP'U]/ML
INJECTION, SOLUTION INTRAMUSCULAR; INTRAVENOUS
Status: DISCONTINUED | OUTPATIENT
Start: 2024-07-16 | End: 2024-07-16

## 2024-07-16 RX ORDER — OXYTOCIN-SODIUM CHLORIDE 0.9% IV SOLN 30 UNIT/500ML 30-0.9/5 UT/ML-%
95 SOLUTION INTRAVENOUS ONCE AS NEEDED
Status: DISCONTINUED | OUTPATIENT
Start: 2024-07-16 | End: 2024-07-16

## 2024-07-16 RX ORDER — CARBOPROST TROMETHAMINE 250 UG/ML
250 INJECTION, SOLUTION INTRAMUSCULAR
Status: DISCONTINUED | OUTPATIENT
Start: 2024-07-16 | End: 2024-07-16

## 2024-07-16 RX ORDER — CLINDAMYCIN PHOSPHATE 150 MG/ML
900 INJECTION, SOLUTION INTRAVENOUS
Status: DISCONTINUED | OUTPATIENT
Start: 2024-07-16 | End: 2024-07-16

## 2024-07-16 RX ORDER — SIMETHICONE 80 MG
1 TABLET,CHEWABLE ORAL 4 TIMES DAILY PRN
Status: DISCONTINUED | OUTPATIENT
Start: 2024-07-16 | End: 2024-07-16

## 2024-07-16 RX ORDER — TRANEXAMIC ACID 10 MG/ML
1000 INJECTION, SOLUTION INTRAVENOUS EVERY 30 MIN PRN
Status: DISCONTINUED | OUTPATIENT
Start: 2024-07-16 | End: 2024-07-19 | Stop reason: HOSPADM

## 2024-07-16 RX ORDER — OXYTOCIN 10 [USP'U]/ML
10 INJECTION, SOLUTION INTRAMUSCULAR; INTRAVENOUS ONCE AS NEEDED
Status: DISCONTINUED | OUTPATIENT
Start: 2024-07-16 | End: 2024-07-19 | Stop reason: HOSPADM

## 2024-07-16 RX ORDER — DIPHENHYDRAMINE HCL 25 MG
25 CAPSULE ORAL EVERY 4 HOURS PRN
Status: DISCONTINUED | OUTPATIENT
Start: 2024-07-16 | End: 2024-07-19 | Stop reason: HOSPADM

## 2024-07-16 RX ORDER — PRENATAL WITH FERROUS FUM AND FOLIC ACID 3080; 920; 120; 400; 22; 1.84; 3; 20; 10; 1; 12; 200; 27; 25; 2 [IU]/1; [IU]/1; MG/1; [IU]/1; MG/1; MG/1; MG/1; MG/1; MG/1; MG/1; UG/1; MG/1; MG/1; MG/1; MG/1
1 TABLET ORAL DAILY
Status: DISCONTINUED | OUTPATIENT
Start: 2024-07-17 | End: 2024-07-19 | Stop reason: HOSPADM

## 2024-07-16 RX ORDER — FENTANYL/BUPIVACAINE/NS/PF 2MCG/ML-.1
PLASTIC BAG, INJECTION (ML) INJECTION CONTINUOUS
Status: DISCONTINUED | OUTPATIENT
Start: 2024-07-16 | End: 2024-07-16

## 2024-07-16 RX ORDER — TRANEXAMIC ACID 10 MG/ML
1000 INJECTION, SOLUTION INTRAVENOUS EVERY 30 MIN PRN
Status: DISCONTINUED | OUTPATIENT
Start: 2024-07-16 | End: 2024-07-16

## 2024-07-16 RX ORDER — IBUPROFEN 400 MG/1
800 TABLET ORAL EVERY 8 HOURS
Status: DISCONTINUED | OUTPATIENT
Start: 2024-07-18 | End: 2024-07-19 | Stop reason: HOSPADM

## 2024-07-16 RX ORDER — LIDOCAINE HYDROCHLORIDE AND EPINEPHRINE 15; 5 MG/ML; UG/ML
INJECTION, SOLUTION EPIDURAL
Status: DISCONTINUED | OUTPATIENT
Start: 2024-07-16 | End: 2024-07-16

## 2024-07-16 RX ORDER — OXYTOCIN-SODIUM CHLORIDE 0.9% IV SOLN 30 UNIT/500ML 30-0.9/5 UT/ML-%
10 SOLUTION INTRAVENOUS ONCE
Status: DISCONTINUED | OUTPATIENT
Start: 2024-07-16 | End: 2024-07-16

## 2024-07-16 RX ORDER — AMOXICILLIN 250 MG
1 CAPSULE ORAL NIGHTLY PRN
Status: DISCONTINUED | OUTPATIENT
Start: 2024-07-16 | End: 2024-07-19 | Stop reason: HOSPADM

## 2024-07-16 RX ORDER — ONDANSETRON 8 MG/1
8 TABLET, ORALLY DISINTEGRATING ORAL EVERY 8 HOURS PRN
Status: DISCONTINUED | OUTPATIENT
Start: 2024-07-16 | End: 2024-07-19 | Stop reason: HOSPADM

## 2024-07-16 RX ORDER — DIPHENOXYLATE HYDROCHLORIDE AND ATROPINE SULFATE 2.5; .025 MG/1; MG/1
2 TABLET ORAL EVERY 6 HOURS PRN
Status: DISCONTINUED | OUTPATIENT
Start: 2024-07-16 | End: 2024-07-19 | Stop reason: HOSPADM

## 2024-07-16 RX ORDER — SIMETHICONE 80 MG
1 TABLET,CHEWABLE ORAL EVERY 6 HOURS PRN
Status: DISCONTINUED | OUTPATIENT
Start: 2024-07-16 | End: 2024-07-19 | Stop reason: HOSPADM

## 2024-07-16 RX ORDER — SODIUM CITRATE AND CITRIC ACID MONOHYDRATE 334; 500 MG/5ML; MG/5ML
30 SOLUTION ORAL ONCE
Status: COMPLETED | OUTPATIENT
Start: 2024-07-16 | End: 2024-07-16

## 2024-07-16 RX ORDER — LIDOCAINE HCL/EPINEPHRINE/PF 2%-1:200K
VIAL (ML) INJECTION
Status: DISCONTINUED | OUTPATIENT
Start: 2024-07-16 | End: 2024-07-16

## 2024-07-16 RX ORDER — SODIUM CHLORIDE, SODIUM LACTATE, POTASSIUM CHLORIDE, CALCIUM CHLORIDE 600; 310; 30; 20 MG/100ML; MG/100ML; MG/100ML; MG/100ML
INJECTION, SOLUTION INTRAVENOUS CONTINUOUS PRN
Status: DISCONTINUED | OUTPATIENT
Start: 2024-07-16 | End: 2024-07-16

## 2024-07-16 RX ORDER — METHYLERGONOVINE MALEATE 0.2 MG/ML
200 INJECTION INTRAVENOUS ONCE AS NEEDED
Status: COMPLETED | OUTPATIENT
Start: 2024-07-16 | End: 2024-07-16

## 2024-07-16 RX ORDER — FENTANYL/BUPIVACAINE/NS/PF 2MCG/ML-.1
PLASTIC BAG, INJECTION (ML) INJECTION
Status: DISCONTINUED | OUTPATIENT
Start: 2024-07-16 | End: 2024-07-16

## 2024-07-16 RX ORDER — DOCUSATE SODIUM 100 MG/1
200 CAPSULE, LIQUID FILLED ORAL 2 TIMES DAILY
Status: DISCONTINUED | OUTPATIENT
Start: 2024-07-16 | End: 2024-07-19 | Stop reason: HOSPADM

## 2024-07-16 RX ORDER — OXYTOCIN-SODIUM CHLORIDE 0.9% IV SOLN 30 UNIT/500ML 30-0.9/5 UT/ML-%
95 SOLUTION INTRAVENOUS ONCE
Status: DISCONTINUED | OUTPATIENT
Start: 2024-07-16 | End: 2024-07-16

## 2024-07-16 RX ORDER — CALCIUM CARBONATE 200(500)MG
500 TABLET,CHEWABLE ORAL 3 TIMES DAILY PRN
Status: DISCONTINUED | OUTPATIENT
Start: 2024-07-16 | End: 2024-07-16

## 2024-07-16 RX ORDER — SODIUM CHLORIDE 9 MG/ML
INJECTION, SOLUTION INTRAVENOUS
Status: DISCONTINUED | OUTPATIENT
Start: 2024-07-16 | End: 2024-07-16

## 2024-07-16 RX ORDER — SODIUM CHLORIDE, SODIUM LACTATE, POTASSIUM CHLORIDE, CALCIUM CHLORIDE 600; 310; 30; 20 MG/100ML; MG/100ML; MG/100ML; MG/100ML
INJECTION, SOLUTION INTRAVENOUS CONTINUOUS
Status: DISCONTINUED | OUTPATIENT
Start: 2024-07-16 | End: 2024-07-16

## 2024-07-16 RX ORDER — DEXAMETHASONE SODIUM PHOSPHATE 4 MG/ML
INJECTION, SOLUTION INTRA-ARTICULAR; INTRALESIONAL; INTRAMUSCULAR; INTRAVENOUS; SOFT TISSUE
Status: DISCONTINUED | OUTPATIENT
Start: 2024-07-16 | End: 2024-07-16

## 2024-07-16 RX ADMIN — GENTAMICIN SULFATE 376 MG: 40 INJECTION, SOLUTION INTRAMUSCULAR; INTRAVENOUS at 09:07

## 2024-07-16 RX ADMIN — Medication 1.5 MG: at 10:07

## 2024-07-16 RX ADMIN — DEXAMETHASONE SODIUM PHOSPHATE 4 MG: 4 INJECTION, SOLUTION INTRAMUSCULAR; INTRAVENOUS at 09:07

## 2024-07-16 RX ADMIN — LIDOCAINE HYDROCHLORIDE,EPINEPHRINE BITARTRATE 3 ML: 15; .005 INJECTION, SOLUTION EPIDURAL; INFILTRATION; INTRACAUDAL; PERINEURAL at 06:07

## 2024-07-16 RX ADMIN — OXYTOCIN 5 UNITS: 10 INJECTION, SOLUTION INTRAMUSCULAR; INTRAVENOUS at 09:07

## 2024-07-16 RX ADMIN — OXYTOCIN 5 UNITS: 10 INJECTION, SOLUTION INTRAMUSCULAR; INTRAVENOUS at 10:07

## 2024-07-16 RX ADMIN — AZITHROMYCIN MONOHYDRATE 500 MG: 500 INJECTION, POWDER, LYOPHILIZED, FOR SOLUTION INTRAVENOUS at 08:07

## 2024-07-16 RX ADMIN — FAMOTIDINE 20 MG: 10 INJECTION, SOLUTION INTRAVENOUS at 08:07

## 2024-07-16 RX ADMIN — LEVOTHYROXINE SODIUM 75 MCG: 25 TABLET ORAL at 06:07

## 2024-07-16 RX ADMIN — ONDANSETRON HYDROCHLORIDE 4 MG: 2 INJECTION, SOLUTION INTRAVENOUS at 07:07

## 2024-07-16 RX ADMIN — KETOROLAC TROMETHAMINE 30 MG: 30 INJECTION, SOLUTION INTRAMUSCULAR; INTRAVENOUS at 09:07

## 2024-07-16 RX ADMIN — OXYTOCIN 10 UNITS: 10 INJECTION, SOLUTION INTRAMUSCULAR; INTRAVENOUS at 09:07

## 2024-07-16 RX ADMIN — LIDOCAINE HYDROCHLORIDE,EPINEPHRINE BITARTRATE 5 ML: 20; .005 INJECTION, SOLUTION EPIDURAL; INFILTRATION; INTRACAUDAL; PERINEURAL at 09:07

## 2024-07-16 RX ADMIN — ONDANSETRON 4 MG: 2 INJECTION INTRAMUSCULAR; INTRAVENOUS at 07:07

## 2024-07-16 RX ADMIN — Medication 5 ML: at 08:07

## 2024-07-16 RX ADMIN — SODIUM CITRATE AND CITRIC ACID MONOHYDRATE 30 ML: 500; 334 SOLUTION ORAL at 08:07

## 2024-07-16 RX ADMIN — LIDOCAINE HYDROCHLORIDE,EPINEPHRINE BITARTRATE 3 ML: 15; .005 INJECTION, SOLUTION EPIDURAL; INFILTRATION; INTRACAUDAL; PERINEURAL at 08:07

## 2024-07-16 RX ADMIN — CLINDAMYCIN PHOSPHATE 900 MG: 900 INJECTION, SOLUTION INTRAVENOUS at 09:07

## 2024-07-16 RX ADMIN — ACETAMINOPHEN 1000 MG: 500 TABLET ORAL at 09:07

## 2024-07-16 RX ADMIN — Medication 4 MILLI-UNITS/MIN: at 01:07

## 2024-07-16 RX ADMIN — FENTANYL CITRATE 100 MCG: 0.05 INJECTION, SOLUTION INTRAMUSCULAR; INTRAVENOUS at 09:07

## 2024-07-16 RX ADMIN — Medication 3 ML: at 06:07

## 2024-07-16 RX ADMIN — AMPICILLIN SODIUM 2 G: 2 INJECTION, POWDER, FOR SOLUTION INTRAMUSCULAR; INTRAVENOUS at 09:07

## 2024-07-16 RX ADMIN — Medication 8 ML/HR: at 09:07

## 2024-07-16 RX ADMIN — SODIUM CHLORIDE, SODIUM LACTATE, POTASSIUM CHLORIDE, AND CALCIUM CHLORIDE: 600; 310; 30; 20 INJECTION, SOLUTION INTRAVENOUS at 09:07

## 2024-07-16 RX ADMIN — SODIUM CHLORIDE, POTASSIUM CHLORIDE, SODIUM LACTATE AND CALCIUM CHLORIDE: 600; 310; 30; 20 INJECTION, SOLUTION INTRAVENOUS at 01:07

## 2024-07-16 NOTE — PROGRESS NOTES
LABOR NOTE    S:  Complaints: No.  Epidural working:  not applicable      O: /68   Pulse 75   Temp 98.2 °F (36.8 °C) (Oral)   Resp 16   LMP 10/07/2023   SpO2 96%   Breastfeeding No     FHT: baseline 130, moderate BTBV, + accels, no decels Cat 1 (reassuring)  CTX: q 2-5 minutes  SVE: 4/60/-3    Timeline  0100: 3/60/-3  0600: 4/60/-3    PLAN:  Continue Close Maternal/Fetal Monitoring  Pitocin Augmentation per protocol  Recheck 2-4 hours or PRN  AROM at next check    Sita Dukes MD  PGY-4 OB/GYN

## 2024-07-16 NOTE — PROGRESS NOTES
LABOR NOTE    S:  Complaints: No.  Epidural Working:  yes  Dr. Schumacher to bedside for cervical exam.     O: /84   Pulse 94   Temp 100.1 °F (37.8 °C) (Axillary)   Resp 18   LMP 10/07/2023   SpO2 98%   Breastfeeding No     FHT: 130 bpm, mod variability, + accels, - decels; Cat 1 (reassuring)  CTX: q  2 minutes, pit @ 24 mU/min   SVE: 6/90/-1, with contraction fetal head descends well and cervix felt minimally on sides     TIMELINE:   0930: 4/80/-2, AROM clr   1240: 5/90/-1  1730: 6/90/-1, pit at 24 mU/min     PLAN:    IOL:   Continue Close Maternal/Fetal Monitoring  Pitocin Augmentation per protocol  Recheck 2 hours or PRN    Wendie Yoo MD/MPH  OB/GYN PGY-4  [facility]

## 2024-07-16 NOTE — H&P
HISTORY AND PHYSICAL                                                OBSTETRICS          Subjective:       Roxanne Harmon is a 34 y.o.  female with IUP at 39w6d weeks gestation who presents with induction of labor.    Patient reports contractions, reports vaginal bleeding, denies LOF.   Fetal Movement: normal.    This IUP is complicated by Rh-, hypothyroidism, elevated BP x1.    Review of Systems   Constitutional:  Negative for chills and fever.   Eyes:  Negative for visual disturbance.   Respiratory:  Negative for shortness of breath.    Cardiovascular:  Negative for chest pain.   Genitourinary:  Positive for vaginal bleeding. Negative for vaginal discharge.   Neurological:  Negative for headaches.       PMHx:   Past Medical History:   Diagnosis Date    Bee sting allergy     Chronic idiopathic urticaria        PSHx:   Past Surgical History:   Procedure Laterality Date    OPEN REDUCTION AND INTERNAL FIXATION (ORIF) OF INJURY OF ANKLE  2022    OPEN REDUCTION AND INTERNAL FIXATION (ORIF) OF INJURY OF WRIST Right 2021    Procedure: ORIF, WRIST scaphoid right;  Surgeon: Akiko Starks MD;  Location: [facility];  Service: Orthopedics;  Laterality: Right;  MAC/Regional    REDUCTION OF BOTH BREASTS Bilateral 2018    Procedure: MAMMOPLASTY, REDUCTION, BILATERAL;  Surgeon: Asa Mak MD;  Location: [address];  Service: Plastics;  Laterality: Bilateral;       All:   Review of patient's allergies indicates:   Allergen Reactions    Bee sting [allergen ext-venom-honey bee] Anaphylaxis    Venom-honey bee Hives       Meds:   Medications Prior to Admission   Medication Sig Dispense Refill Last Dose    BABY ASPIRIN ORAL Take by mouth.       EPINEPHrine (EPIPEN 2-BUCK) 0.3 mg/0.3 mL AtIn Inject 0.3 mLs (0.3 mg total) into the muscle once as needed (anaphylaxis). 2 each 3     levothyroxine (SYNTHROID) 75 MCG tablet Take 1 tablet (75 mcg total) by mouth before breakfast. 90 tablet 3      -iron fum-folic acid (PRENATAL 19) 29 mg iron- 1 mg Tab Take by mouth.          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        They have been together since 2015        Engaged to be  date set for 7/2023        No kids        Partner since 10/2015         Social Determinants of Health     Financial Resource Strain: Low Risk  (3/12/2020)    Overall Financial Resource Strain (CARDIA)     Difficulty of Paying Living Expenses: Not hard at all   Food Insecurity: No Food Insecurity (3/12/2020)    Hunger Vital Sign     Worried About Running Out of Food in the Last Year: Never true     Ran Out of Food in the Last Year: Never true   Transportation Needs: No Transportation Needs (3/12/2020)    PRAPARE - Transportation     Lack of Transportation (Medical): No     Lack of Transportation (Non-Medical): No   Physical Activity: Sufficiently Active (3/12/2020)    Exercise Vital Sign     Days of Exercise per Week: 3 days     Minutes of Exercise per Session: 60 min   Stress: No Stress Concern Present (3/12/2020)    [employer] - Occupational Stress Questionnaire     Feeling of Stress : Not at all       FH:   Family History   Problem Relation Name Age of Onset    Hyperlipidemia Mother BRACA neg     Hypothyroidism Mother BRACA neg     BRCA 1/2 Mother BRACA neg     Hyperlipidemia Father      Hypertension Father      Skin cancer Father          ?squamous    Ovarian cancer  Maternal Grandmother  70    Skin cancer Paternal Grandmother Chelle     Diabetes Paternal Grandmother Chelle     Prostate cancer Paternal Grandfather      Pancreatic cancer Paternal Grandfather  83    Breast cancer Neg Hx      Colon cancer Neg Hx      Stroke Neg Hx      Esophageal cancer Neg Hx      Celiac disease Neg Hx      Cirrhosis Neg Hx      Liver disease Neg Hx      Rectal cancer Neg Hx      Stomach cancer Neg Hx      Liver cancer Neg Hx      Irritable bowel syndrome Neg Hx      Inflammatory bowel disease Neg Hx      Hemochromatosis Neg Hx      Cystic fibrosis Neg Hx      Crohn's disease Neg Hx      Colon polyps Neg Hx      Ulcerative colitis Neg Hx      Lymphoma Neg Hx      Tuberculosis Neg Hx      Des's disease Neg Hx      Scleroderma Neg Hx      Rheum arthritis Neg Hx      Melanoma Neg Hx      Multiple sclerosis Neg Hx      Lupus Neg Hx      Psoriasis Neg Hx      Kidney cancer Neg Hx      Bladder Cancer Neg Hx      Uterine cancer Neg Hx         OBHx:   OB History    Para Term  AB Living   1 0 0 0 0 0   SAB IAB Ectopic Multiple Live Births   0 0 0 0 0      # Outcome Date GA Lbr Jaden/2nd Weight Sex Type Anes PTL Lv   1 Current                Objective:       /85   Pulse 100   Temp 98.2 °F (36.8 °C) (Oral)   Resp 16   LMP 10/07/2023   SpO2 97%   Breastfeeding No     Vitals:    24 0040   BP: 122/85   Pulse: 100   Resp: 16   Temp: 98.2 °F (36.8 °C)   TempSrc: Oral   SpO2: 97%       General:   alert, appears stated age and cooperative, no apparent distress   HENT:  normocephalic, atraumatic   Eyes:  extraocular movements and conjunctivae normal   Neck:  supple, range of motion normal, no thyromegaly   Lungs:   no respiratory distress   Heart:   regular rate   Abdomen:  soft, non-tender, non-distended but gravid, no rebound or guarding    Extremities negative edema, negative erythema   FHT: 130, moderate BTBV, +accels, -decels;  Cat 1 (reassuring)                 TOCO: irregular    Presentations: cephalic by ultrasound   Cervix:     Dilation: 3cm    Effacement: 60    Station:  -3             EFW by Leopold's: 7    Lab Review  Blood Type A NEG  GBBS: negative  Rubella: Immune  RPR: neg  HIV: negative  HepB: negative       Assessment:       39w6d weeks gestation with induction of labor    Active Hospital Problems    Diagnosis  POA    *Encounter for induction of labor [Z34.90]  Not Applicable    Rh negative status during pregnancy in third trimester [O26.893, Z67.91]  Unknown    Hypothyroidism [E03.9]  Unknown      Resolved Hospital Problems   No resolved problems to display.          Plan:     1.Induction of Labor   Risks, benefits, alternatives and possible complications have been discussed in detail with the patient.   - Consents signed and to chart  - Admit to Labor and Delivery unit   - Epidural per Anesthesia  - Draw CBC, T&S  - Notify Staff  - Recheck in 4 hrs or PRN  - s/p roper  -pitocin augmentation per protocol     Post-Partum Hemorrhage risk - low    2. Rh neg    3. Hypothyroidism   -home synthroid     Judi Haynes MD  Obstetrics & Gynecology, PGY-1

## 2024-07-16 NOTE — ANESTHESIA PREPROCEDURE EVALUATION
[facility]  Anesthesia Pre-Operative Evaluation         Patient Name: Roxanne Harmon  YOB: 1990  MRN: [id number]    2024      Roxanne Harmon is a 34 y.o. female  @ 39w6d who presents for IOL. IUP complicated by hypothyroidism.    Denies previous neuraxial anesthesia.    Denies previous issues with general anesthesia.    Denies family history of issues with anesthesia.     Denies hx of seizures, strokes, heart failure, smoking, asthma, COPD, acid reflux, liver disease, kidney disease, bleeding disorders, taking blood thinners, back surgery      OB History    Para Term  AB Living   1 0 0 0 0 0   SAB IAB Ectopic Multiple Live Births   0 0 0 0        # Outcome Date GA Lbr Jaden/2nd Weight Sex Type Anes PTL Lv   1 Current                Review of patient's allergies indicates:   Allergen Reactions    Bee sting [allergen ext-venom-honey bee] Anaphylaxis    Venom-honey bee Hives       Wt Readings from Last 1 Encounters:   07/15/24 1308 95.6 kg (210 lb 12.2 oz)       BP Readings from Last 3 Encounters:   24 122/85   07/15/24 (!) 122/94   24 106/70       Patient Active Problem List   Diagnosis    Bee sting allergy    Acute left ankle pain    Open left ankle fracture, sequela    Amenorrhea    Rh negative status during pregnancy in first trimester    Encounter for induction of labor    Rh negative status during pregnancy in third trimester    Hypothyroidism       Past Surgical History:   Procedure Laterality Date    OPEN REDUCTION AND INTERNAL FIXATION (ORIF) OF INJURY OF ANKLE  2022    OPEN REDUCTION AND INTERNAL FIXATION (ORIF) OF INJURY OF WRIST Right 2021    Procedure: ORIF, WRIST scaphoid right;  Surgeon: Akiko Starks MD;  Location: [facility];  Service: Orthopedics;  Laterality: Right;  MAC/Regional    REDUCTION OF BOTH  BREASTS Bilateral 11/01/2018    Procedure: MAMMOPLASTY, REDUCTION, BILATERAL;  Surgeon: Asa Mak MD;  Location: [facility] OR [address] [place] [address];  Service: Plastics;  Laterality: Bilateral;       Social History     Socioeconomic History    Marital status:    Tobacco Use    Smoking status: Never    Smokeless tobacco: Never   Substance and Sexual Activity    Alcohol use: Yes     Alcohol/week: 4.0 standard drinks of alcohol     Types: 4 Glasses of wine per week    Drug use: No    Sexual activity: Yes     Partners: Male     Birth control/protection: OCP     Comment: Partner since 2015   Social History Narrative    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        They have been together since 2015        Engaged to be  date set for 7/2023        No kids        Partner since 10/2015         Social Determinants of Health     Financial Resource Strain: Low Risk  (3/12/2020)    Overall Financial Resource Strain (CARDIA)     Difficulty of Paying Living Expenses: Not hard at all   Food Insecurity: No Food Insecurity (3/12/2020)    Hunger Vital Sign     Worried About Running Out of Food in the Last Year: Never true     Ran Out of Food in the Last Year: Never true   Transportation Needs: No Transportation Needs (3/12/2020)    PRAPARE - Transportation     Lack of Transportation (Medical): No     Lack of Transportation (Non-Medical): No   Physical Activity: Sufficiently Active (3/12/2020)    Exercise Vital Sign     Days of Exercise per Week: 3 days     Minutes of Exercise per Session: 60 min   Stress: No Stress Concern Present (3/12/2020)    [employer] - Occupational Stress Questionnaire     Feeling of Stress : Not at all         Chemistry        Component Value Date/Time     11/30/2023 1128    K 3.8 11/30/2023 1128     11/30/2023 1128    CO2 23 11/30/2023 1128    BUN 8 11/30/2023 1128  "   CREATININE 0.7 11/30/2023 1128    GLU 95 11/30/2023 1128        Component Value Date/Time    CALCIUM 9.8 11/30/2023 1128    ALKPHOS 84 07/12/2023 0745    AST 23 07/12/2023 0745    ALT 21 07/12/2023 0745    BILITOT 0.5 07/12/2023 0745    ESTGFRAFRICA >105 02/23/2022 1256    ESTGFRAFRICA >60 03/03/2021 0743    EGFRNONAA >60 03/03/2021 0743            Lab Results   Component Value Date    WBC 10.33 06/13/2024    HGB 14.2 06/13/2024    HCT 41.6 06/13/2024    MCV 89 06/13/2024     06/13/2024       No results for input(s): "PT", "INR", "PROTIME", "APTT" in the last 72 hours.            Pre-op Assessment    I have reviewed the Patient Summary Reports.     I have reviewed the Nursing Notes. I have reviewed the NPO Status.   I have reviewed the Medications.     Review of Systems  Anesthesia Hx:  No problems with previous Anesthesia   History of prior surgery of interest to airway management or planning:          Denies Family Hx of Anesthesia complications.    Denies Personal Hx of Anesthesia complications.                    Social:  Non-Smoker, No Alcohol Use       Hematology/Oncology:       -- Denies Anemia:                                  Cardiovascular:      Denies Hypertension.    Denies CAD.        Denies CHF.    no hyperlipidemia                             Pulmonary:    Denies COPD.  Denies Asthma.                    Renal/:   Denies Chronic Renal Disease.                Hepatic/GI:      Denies GERD.             Musculoskeletal:  Denies Arthritis.               Neurological:    Denies CVA.    Denies Seizures.                                Endocrine:  Denies Diabetes. Hypothyroidism          Psych:  Denies Psychiatric History.                  Physical Exam  General: Well nourished, Cooperative, Alert and Oriented    Airway:  Mallampati: II   Mouth Opening: Normal  TM Distance: Normal  Tongue: Normal  Neck ROM: Normal ROM    Dental:  Intact        Anesthesia Plan  Type of Anesthesia, risks & benefits " discussed:    Anesthesia Type: Epidural, Spinal, CSE  Intra-op Monitoring Plan: Standard ASA Monitors  Post Op Pain Control Plan: multimodal analgesia and IV/PO Opioids PRN  Informed Consent: Informed consent signed with the Patient and all parties understand the risks and agree with anesthesia plan.  All questions answered.   ASA Score: 2  Day of Surgery Review of History & Physical: H&P Update referred to the surgeon/provider.    Ready For Surgery From Anesthesia Perspective.     .

## 2024-07-16 NOTE — PROGRESS NOTES
LABOR NOTE    S:  Complaints: No.  Epidural Working:  yes  Dr. Schumacher to bedside for cervical exam.     O: /74   Pulse 82   Temp 97.6 °F (36.4 °C) (Oral)   Resp 18   LMP 10/07/2023   SpO2 100%   Breastfeeding No     FHT: 130 bpm, mod variability, + accels, - decels; Cat 1 (reassuring)  CTX: q  minutes, pit @ 24 mU/min   SVE: 4/80/-2, AROM clr     TIMELINE:   0930: 4/80/-2, AROM clr     PLAN:    IOL:   Continue Close Maternal/Fetal Monitoring  Pitocin Augmentation per protocol  Recheck 2 hours or PRN    Wendie Yoo MD/MPH  OB/GYN PGY-4  [facility]

## 2024-07-16 NOTE — ANESTHESIA PROCEDURE NOTES
Epidural    Patient location during procedure: OB   Reason for block: primary anesthetic   Reason for block: labor analgesia requested by patient and obstetrician  Diagnosis: IUP   Start time: 7/16/2024 8:40 AM  Timeout: 7/16/2024 8:30 AM  End time: 7/16/2024 8:57 AM    Staffing  Performing Provider: Toi Sanabria MD  Authorizing Provider: Bernie Kelly MD    Staffing  Performed by: Toi Sanabria MD  Authorized by: Bernie Kelly MD        Preanesthetic Checklist  Completed: patient identified, IV checked, site marked, risks and benefits discussed, surgical consent, monitors and equipment checked, pre-op evaluation, timeout performed, anesthesia consent given, hand hygiene performed and patient being monitored  Preparation  Patient position: sitting  Prep: ChloraPrep  Patient monitoring: continuous capnometry and Blood Pressure  Reason for block: primary anesthetic   Epidural  Skin Anesthetic: lidocaine 1%  Skin Wheal: 1 mL  Administration type: continuous  Approach: midline  Interspace: L3-4    Injection technique: DANI air  Needle and Epidural Catheter  Needle type: Tuohy   Needle gauge: 17  Needle length: 3.5 inches  Needle insertion depth: 8 cm  Catheter type: springwound  Catheter size: 19 G  Catheter at skin depth: 12 cm  Insertion Attempts: 1  Test dose: 3 mL of lidocaine 1.5% with Epi 1-to-200,000  Additional Documentation: incremental injection, negative aspiration for heme and CSF, no paresthesia on injection, no signs/symptoms of IV or SA injection, no significant complaints from patient and no significant pain on injection  Needle localization: anatomical landmarks  Assessment  Ease of block: easy  Patient's tolerance of the procedure: comfortable throughout block No inadvertent dural puncture with Tuohy.  Dural puncture performed with spinal needle.

## 2024-07-17 LAB
BASOPHILS # BLD AUTO: 0.05 K/UL (ref 0–0.2)
BASOPHILS NFR BLD: 0.2 % (ref 0–1.9)
DIFFERENTIAL METHOD BLD: ABNORMAL
EOSINOPHIL # BLD AUTO: 0 K/UL (ref 0–0.5)
EOSINOPHIL NFR BLD: 0 % (ref 0–8)
ERYTHROCYTE [DISTWIDTH] IN BLOOD BY AUTOMATED COUNT: 13.2 % (ref 11.5–14.5)
HCT VFR BLD AUTO: 33.6 % (ref 37–48.5)
HGB BLD-MCNC: 11.8 G/DL (ref 12–16)
IMM GRANULOCYTES # BLD AUTO: 0.11 K/UL (ref 0–0.04)
IMM GRANULOCYTES NFR BLD AUTO: 0.5 % (ref 0–0.5)
LYMPHOCYTES # BLD AUTO: 2 K/UL (ref 1–4.8)
LYMPHOCYTES NFR BLD: 9.1 % (ref 18–48)
MCH RBC QN AUTO: 31.5 PG (ref 27–31)
MCHC RBC AUTO-ENTMCNC: 35.1 G/DL (ref 32–36)
MCV RBC AUTO: 90 FL (ref 82–98)
MONOCYTES # BLD AUTO: 1.5 K/UL (ref 0.3–1)
MONOCYTES NFR BLD: 6.7 % (ref 4–15)
NEUTROPHILS # BLD AUTO: 18.5 K/UL (ref 1.8–7.7)
NEUTROPHILS NFR BLD: 83.5 % (ref 38–73)
NRBC BLD-RTO: 0 /100 WBC
PLATELET # BLD AUTO: 232 K/UL (ref 150–450)
PMV BLD AUTO: 9.5 FL (ref 9.2–12.9)
RBC # BLD AUTO: 3.75 M/UL (ref 4–5.4)
WBC # BLD AUTO: 22.11 K/UL (ref 3.9–12.7)

## 2024-07-17 PROCEDURE — 63600175 PHARM REV CODE 636 W HCPCS

## 2024-07-17 PROCEDURE — 85025 COMPLETE CBC W/AUTO DIFF WBC: CPT | Performed by: OBSTETRICS & GYNECOLOGY

## 2024-07-17 PROCEDURE — 36415 COLL VENOUS BLD VENIPUNCTURE: CPT | Performed by: OBSTETRICS & GYNECOLOGY

## 2024-07-17 PROCEDURE — 25000003 PHARM REV CODE 250

## 2024-07-17 PROCEDURE — 63600175 PHARM REV CODE 636 W HCPCS: Mod: JZ,JG | Performed by: OBSTETRICS & GYNECOLOGY

## 2024-07-17 PROCEDURE — 11000001 HC ACUTE MED/SURG PRIVATE ROOM

## 2024-07-17 RX ORDER — OXYCODONE HYDROCHLORIDE 5 MG/1
5 TABLET ORAL EVERY 4 HOURS PRN
Status: DISPENSED | OUTPATIENT
Start: 2024-07-17 | End: 2024-07-18

## 2024-07-17 RX ORDER — ACETAMINOPHEN 325 MG/1
650 TABLET ORAL EVERY 6 HOURS
Status: COMPLETED | OUTPATIENT
Start: 2024-07-17 | End: 2024-07-18

## 2024-07-17 RX ORDER — OXYCODONE HYDROCHLORIDE 10 MG/1
10 TABLET ORAL EVERY 4 HOURS PRN
Status: ACTIVE | OUTPATIENT
Start: 2024-07-17 | End: 2024-07-18

## 2024-07-17 RX ORDER — SODIUM CHLORIDE, SODIUM LACTATE, POTASSIUM CHLORIDE, CALCIUM CHLORIDE 600; 310; 30; 20 MG/100ML; MG/100ML; MG/100ML; MG/100ML
INJECTION, SOLUTION INTRAVENOUS
Status: DISCONTINUED | OUTPATIENT
Start: 2024-07-17 | End: 2024-07-19 | Stop reason: HOSPADM

## 2024-07-17 RX ORDER — PROCHLORPERAZINE EDISYLATE 5 MG/ML
5 INJECTION INTRAMUSCULAR; INTRAVENOUS EVERY 6 HOURS PRN
Status: DISCONTINUED | OUTPATIENT
Start: 2024-07-17 | End: 2024-07-19 | Stop reason: HOSPADM

## 2024-07-17 RX ADMIN — SODIUM CHLORIDE, POTASSIUM CHLORIDE, SODIUM LACTATE AND CALCIUM CHLORIDE: 600; 310; 30; 20 INJECTION, SOLUTION INTRAVENOUS at 09:07

## 2024-07-17 RX ADMIN — DOCUSATE SODIUM 200 MG: 100 CAPSULE, LIQUID FILLED ORAL at 08:07

## 2024-07-17 RX ADMIN — IBUPROFEN 800 MG: 400 TABLET ORAL at 11:07

## 2024-07-17 RX ADMIN — AMPICILLIN SODIUM 2 G: 2 INJECTION, POWDER, FOR SOLUTION INTRAMUSCULAR; INTRAVENOUS at 10:07

## 2024-07-17 RX ADMIN — GENTAMICIN SULFATE 376 MG: 40 INJECTION, SOLUTION INTRAMUSCULAR; INTRAVENOUS at 10:07

## 2024-07-17 RX ADMIN — KETOROLAC TROMETHAMINE 30 MG: 30 INJECTION, SOLUTION INTRAMUSCULAR; INTRAVENOUS at 10:07

## 2024-07-17 RX ADMIN — OXYCODONE HYDROCHLORIDE 5 MG: 5 TABLET ORAL at 08:07

## 2024-07-17 RX ADMIN — ENOXAPARIN SODIUM 40 MG: 40 INJECTION SUBCUTANEOUS at 05:07

## 2024-07-17 RX ADMIN — LEVOTHYROXINE SODIUM 75 MCG: 25 TABLET ORAL at 05:07

## 2024-07-17 RX ADMIN — CLINDAMYCIN PHOSPHATE 900 MG: 900 INJECTION, SOLUTION INTRAVENOUS at 05:07

## 2024-07-17 RX ADMIN — OXYCODONE HYDROCHLORIDE 5 MG: 5 TABLET ORAL at 01:07

## 2024-07-17 RX ADMIN — SODIUM CHLORIDE, POTASSIUM CHLORIDE, SODIUM LACTATE AND CALCIUM CHLORIDE: 600; 310; 30; 20 INJECTION, SOLUTION INTRAVENOUS at 01:07

## 2024-07-17 RX ADMIN — CLINDAMYCIN PHOSPHATE 900 MG: 900 INJECTION, SOLUTION INTRAVENOUS at 09:07

## 2024-07-17 RX ADMIN — SODIUM CHLORIDE, POTASSIUM CHLORIDE, SODIUM LACTATE AND CALCIUM CHLORIDE: 600; 310; 30; 20 INJECTION, SOLUTION INTRAVENOUS at 10:07

## 2024-07-17 RX ADMIN — PRENATAL VIT W/ FE FUMARATE-FA TAB 27-0.8 MG 1 TABLET: 27-0.8 TAB at 08:07

## 2024-07-17 RX ADMIN — KETOROLAC TROMETHAMINE 30 MG: 30 INJECTION, SOLUTION INTRAMUSCULAR; INTRAVENOUS at 04:07

## 2024-07-17 RX ADMIN — ACETAMINOPHEN 650 MG: 325 TABLET, FILM COATED ORAL at 08:07

## 2024-07-17 RX ADMIN — SODIUM CHLORIDE, POTASSIUM CHLORIDE, SODIUM LACTATE AND CALCIUM CHLORIDE: 600; 310; 30; 20 INJECTION, SOLUTION INTRAVENOUS at 04:07

## 2024-07-17 RX ADMIN — ACETAMINOPHEN 650 MG: 325 TABLET, FILM COATED ORAL at 01:07

## 2024-07-17 RX ADMIN — AMPICILLIN SODIUM 2 G: 2 INJECTION, POWDER, FOR SOLUTION INTRAMUSCULAR; INTRAVENOUS at 03:07

## 2024-07-17 RX ADMIN — ACETAMINOPHEN 650 MG: 325 TABLET, FILM COATED ORAL at 07:07

## 2024-07-17 RX ADMIN — AMPICILLIN SODIUM 2 G: 2 INJECTION, POWDER, FOR SOLUTION INTRAMUSCULAR; INTRAVENOUS at 04:07

## 2024-07-17 RX ADMIN — KETOROLAC TROMETHAMINE 30 MG: 30 INJECTION, SOLUTION INTRAMUSCULAR; INTRAVENOUS at 03:07

## 2024-07-17 RX ADMIN — CLINDAMYCIN PHOSPHATE 900 MG: 900 INJECTION, SOLUTION INTRAVENOUS at 01:07

## 2024-07-17 NOTE — PLAN OF CARE
Problem: Postpartum ( Delivery)  Goal: Successful Parent Role Transition  2024 by Cara Naidu RN  Outcome: Progressing  Goal: Hemostasis  2024 by Cara Naidu RN  Outcome: Progressing  Goal: Effective Bowel Elimination  2024 by Cara Naidu RN  Outcome: Progressing  Goal: Fluid and Electrolyte Balance  2024 by Cara Naidu RN  Outcome: Progressing  Goal: Absence of Infection Signs and Symptoms  2024 by Cara Naidu RN  Outcome: Progressing  Goal: Anesthesia/Sedation Recovery  2024 by Cara Naidu RN  Outcome: Progressing  Goal: Optimal Pain Control and Function  2024 by Cara Naidu RN  Outcome: Progressing  Goal: Nausea and Vomiting Relief  2024 by Cara Naidu RN  Outcome: Progressing  Goal: Effective Urinary Elimination  2024 by Cara Naidu RN  Outcome: Progressing  Goal: Effective Oxygenation and Ventilation  2024 by Cara Naidu RN  Outcome: Progressing

## 2024-07-17 NOTE — CARE UPDATE
Dr. Schumacher to bedside to assess pushing. Patient has been pushing for approximately 1 hour with little to no descent of fetal head. Fetal tachycardia with decelerations noted with pushing. Patient has met criteria for chorio.  recommended to which patient is in agreement. Anesthesia notified. Chorio antibiotics indicated    Sita Dukes MD  PGY-4 OB/GYN

## 2024-07-17 NOTE — TRANSFER OF CARE
"Anesthesia Transfer of Care Note    Patient: Roxanne Harmon    Procedure(s) Performed: * No procedures listed *    Patient location: Labor and Delivery    Anesthesia Type: epidural    Transport from OR: Transported from OR on room air with adequate spontaneous ventilation    Post pain: adequate analgesia    Post assessment: no apparent anesthetic complications and tolerated procedure well    Post vital signs: stable    Level of consciousness: awake, alert and oriented    Nausea/Vomiting: no nausea/vomiting    Complications: none    Transfer of care protocol was followed      Last vitals: Visit Vitals  BP (!) 161/95   Pulse 108   Temp (!) 39 °C (102.2 °F)   Resp 18   Ht 5' 7" (1.702 m)   Wt 95.6 kg (210 lb 12.2 oz)   LMP 10/07/2023   SpO2 96%   Breastfeeding No   BMI 33.01 kg/m²     "

## 2024-07-17 NOTE — LACTATION NOTE
07/17/24 1255   Maternal Assessment   Breast Shape Bilateral:;pendulous   Breast Density Bilateral:;soft   Areola Bilateral:;surgical scarring;elastic   Nipples Bilateral:;flat   Maternal Infant Feeding   Maternal Emotional State assist needed   Infant Positioning clutch/football   Signs of Milk Transfer infant jaw motion present   Pain with Feeding no   Nipple Shape After Feeding, Left pulled out   Latch Assistance yes   Equipment Type   Breast Pump Type double electric, hospital grade   Breast Pump Flange Type hard   Breast Pump Flange Size 21 mm   Breast Pumping   Breast Pumping Interventions post-feed pumping encouraged   Breast Pumping double electric breast pump utilized

## 2024-07-17 NOTE — MEDICAL/APP STUDENT
"POSTPARTUM PROGRESS NOTE    Subjective:     PPD/POD#: 1   Procedure: Primary LTCS   EGA: 39w6d   N/V: No   F/C: No   Abd Pain: Mild, well-controlled with pain medication   Lochia: Mild   Voiding: Yes, via roper   Ambulating: No, roper still in place. Encouraged ambulation today   Bowel fnc: No   Contraception: Per primary OB     Objective:      Temp:  [97.4 °F (36.3 °C)-102.2 °F (39 °C)] 97.4 °F (36.3 °C)  Pulse:  [] 81  Resp:  [16-20] 19  SpO2:  [93 %-100 %] 96 %  BP: ()/(55-95) 106/70    Abdomen: Soft, appropriately tender   Uterus: Firm, no fundal tenderness   Incision: Bandage in place without shadowing     Lab Review    No results for input(s): "NA", "K", "CL", "CO2", "BUN", "CREATININE", "GLU", "PROT", "BILITOT", "ALKPHOS", "ALT", "AST", "MG", "PHOS" in the last 168 hours.    Recent Labs   Lab 07/16/24  0037   WBC 11.13   HGB 14.3   HCT 41.9   MCV 91            I/O    Intake/Output Summary (Last 24 hours) at 7/17/2024 0635  Last data filed at 7/17/2024 0107  Gross per 24 hour   Intake 4404.17 ml   Output 1110 ml   Net 3294.17 ml        Assessment and Plan:   Postpartum care:  - Patient doing well.  - Continue routine management and advances.    Hypothyroidism  - continue home synthroid    Elevated BP x1  - VS as above  - currently asymptomatic  - will continue to monitor    Rh negative  - Will need postpartum rhogam work up  - Baby: O neg    Chorioamnionitis  - VS as above  - currently afebrile  - no fundal tenderness  - Ampicillin/Gentamicin: continue for 24 hours post delivery  - Clindamycin: continue for 24 hours  - s/p rocephin at time of delivery    Victoria Sanchez, MS3  [employer]    I evaluated the patient and agree with the med student note above.     Helena Talley MD  Obstetrics & Gynecology, PGY-1     A student assisted me with documenting this service.  I saw the patient and performed the history, physical exam, and medical decision making.  I reviewed and " verified all information documented by the student and made modifications to such documentation when appropriate.  -- JOSE Calles M.D.

## 2024-07-17 NOTE — PLAN OF CARE
Lactation note:  Lactation consultant's first visit with mother. Reviewed first day expectations for breastfeeding using the breastfeeding guide. The mother will feed infant with cues 8 or more times in 24 hours until content. Colostrum is adequate and important to feed baby the first few days of life. Discussed mom's history of breast surgery and risk for low milk supply. Assisted with deeper latch in football hold; infant nursing effectively with stimulation. Mom started pumping on initiate program for extra stimulation and drops of colostrum given to infant. Reviewed cleaning and sterilizing pump parts as well as storage and collection of breast milk. Left  phone number on board for patient to call for assistance.

## 2024-07-17 NOTE — PLAN OF CARE
Pt VSS. Pain controlled with scheduled pain medication. Triple abx given as scheduled. Breastfeeding with minimal assistance and pumping. Fundus firm and midline with light lochia rubra. LTV dressing in place, dry/intact with scant dried drainage. Bauer discontinued at 0850, voiding spontaneously with adequate output. Passing gas. Will continue to monitor pt.    Problem:  Fall Injury Risk  Goal: Absence of Fall, Infant Drop and Related Injury  Outcome: Progressing     Problem: Breastfeeding  Goal: Effective Breastfeeding  Outcome: Progressing     Problem: Adult Inpatient Plan of Care  Goal: Plan of Care Review  Outcome: Progressing  Goal: Patient-Specific Goal (Individualized)  Outcome: Progressing  Goal: Absence of Hospital-Acquired Illness or Injury  Outcome: Progressing  Goal: Optimal Comfort and Wellbeing  Outcome: Progressing  Goal: Readiness for Transition of Care  Outcome: Progressing     Problem: Infection  Goal: Absence of Infection Signs and Symptoms  Outcome: Progressing     Problem: Skin Injury Risk Increased  Goal: Skin Health and Integrity  Outcome: Progressing     Problem: Wound  Goal: Optimal Coping  Outcome: Progressing  Goal: Optimal Functional Ability  Outcome: Progressing  Goal: Absence of Infection Signs and Symptoms  Outcome: Progressing  Goal: Improved Oral Intake  Outcome: Progressing  Goal: Optimal Pain Control and Function  Outcome: Progressing  Goal: Skin Health and Integrity  Outcome: Progressing  Goal: Optimal Wound Healing  Outcome: Progressing     Problem: Postpartum ( Delivery)  Goal: Successful Parent Role Transition  Outcome: Progressing  Goal: Hemostasis  Outcome: Progressing  Goal: Effective Bowel Elimination  Outcome: Progressing  Goal: Fluid and Electrolyte Balance  Outcome: Progressing  Goal: Absence of Infection Signs and Symptoms  Outcome: Progressing  Goal: Anesthesia/Sedation Recovery  Outcome: Progressing  Goal: Optimal Pain Control and Function  Outcome:  Progressing  Goal: Nausea and Vomiting Relief  Outcome: Progressing  Goal: Effective Urinary Elimination  Outcome: Progressing  Goal: Effective Oxygenation and Ventilation  Outcome: Progressing

## 2024-07-17 NOTE — PROGRESS NOTES
07/16/24 2040   [facility] [place] Note - Strip   Strip Reviewed by Samir Nurse? Yes   [facility] Samir Note - Communication   [place] Nurse Communicated with Bedside Nurse Regarding: Fetal Status   [facility] Samir Note - Notification   Nurse Notified? Yes   Name of Nurse Lola   Doctor Notified? No

## 2024-07-17 NOTE — L&D DELIVERY NOTE
[facility] - Labor & Delivery   Section   Operative Note    SUMMARY     Date of Procedure: 2024     Procedure: Procedure(s) (LRB):   SECTION (N/A)    Surgeons and Role:     * Sita Schumacher MD - Primary     * Rukhsana Cheney MD - Resident - Assisting    Pre-Operative Diagnosis: Arrest of descent, delivered, current hospitalization [O62.1]    Post-Operative Diagnosis: Post-Op Diagnosis Codes:     * Arrest of descent, delivered, current hospitalization [O62.1]    Procedure:   1. Primary  Section via Pfannenstiel skin incision    Indications:   1. failure to progress: arrest of descent    Pre-operative Diagnosis:   1. IUP at 39 week 6 day pregnancy  2. Arrest of descent   3. Hypothyroidism  4. Chorioamnionitis   5. Rh negative    Post-operative Diagnosis:   Same    Anesthesia: Epidural anesthesia    Findings:    1. Single viable  male infant, with APGARS 8/9, weight 3680g.   2. Normal appearing uterus, ovaries, and fallopian tubes.  3. Normal placenta    Estimated Blood Loss:  890 mL           Total IV Fluids: See anesthesia report     UOP: See anesthesia report    Specimens: Placenta, discarded    PreOp CBC:   Lab Results   Component Value Date    WBC 22.11 (H) 2024    HGB 11.8 (L) 2024    HCT 33.6 (L) 2024    MCV 90 2024     2024                     Complications:  None; patient tolerated the procedure well.           Disposition: PACU - hemodynamically stable.           Condition: stable    Procedure Details   The patient was seen in her Labor & Delivery room. Please see Care Update by Dr. Dukes for further details. The risks, benefits, complications, treatment options, and expected outcomes were discussed with the patient.  The patient concurred with the proposed plan, giving informed consent.  The patient was taken to Operating Room, identified as Roxanne Harmon and the procedure verified as Primary  Delivery. A Time Out was  held and the above information confirmed.    After induction of anesthesia, the patient was prepped and draped in the usual sterile manner while placed in a dorsal supine position with a left lateral tilt.  A roper catheter was also placed per nursing. Preoperative antibiotics Ancef 2g and azithromycin 500 mg were administered. An allis test was performed confirming adequate anesthesia.  A Pfannenstiel incision was made and carried down through the subcutaneous tissue to the fascia. Fascial incision was made and extended transversely with curved meehan scissors. The fascia was grasped with [facility] clamps and  from the underlying rectus tissue superiorly and inferiorly. The peritoneum was identified, found to be free of adherent bowel, and entered bluntly. Peritoneal incision was extended longitudinally. The vesico-uterine peritoneum was identified, and bladder blade was inserted. The vesico-uterine peritoneum was incised transversely and the bladder flap was bluntly freed from the lower uterine segment. The bladder blade was reinserted to keep the bladder out of the operative field. A low transverse uterine incision was made with knife and extended with cephalo-caudad traction. The amniotic sac was already ruptured and the infant was noted to be in cephalic presentation. The fetal head was brought to the incision and elevated out of the pelvis. The patient delivered a single viable male infant without difficulty.  Infant weighed 3680 grams with Apgar scores of 8/9 at one and five minutes respectively. After the umbilical cord was clamped and cut, cord segment was obtained for evaluation. The placenta was removed intact, appeared normal, and was discarded. The uterus was exteriorized. The uterine incision was closed with running locked sutures of 1 Chromic as well as an imbricating layer of 1 Chromic. Few figure-of-eight sutures of 2-0 vicryl and Bovie cautery was used inferior midline of hysterotomy.    Hemostasis was observed. The uterine outline, tubes and ovaries appeared normal. The uterus was returned to the abdominal cavity. Incision was reinspected and mild oozing noted near left edge of hysterotomy which was made hemostatic with single figure-of-eight suture of Chromic. Good hemostasis was then noted. The abdominal cavity was irrigated to remove clots. The peritoneum and muscles were reapproximated with three interrupted sutures of Chromic. Mild bleeding noted on right rectus muscle which was initially cauterized with Bovie cautery and then made hemostatic with few figure-of-eight sutures of Vicryl. The fascia was then reapproximated with running sutures of 1 PDS. The subcutaneous fat was reapproximated in two layers with few interrupted sutures of 2-0 Vicryl, then running suture of 2-0 vicryl, and skin was reapproximated with 4-0 monocryl.    Instrument, sponge, and needle counts were correct prior the abdominal closure and at the conclusion of the case.     Pt tolerated procedure well and was in stable condition after the procedure.      **NOTE: This patient IS a candidate for trial of labor after  delivery**    Rukhsana Cheney MD PGY2  Obstetrics and Gynecology      Specimens:   Specimen (24h ago, onward)      None            Condition: Good    VTE Risk Mitigation (From admission, onward)           Ordered     enoxaparin injection 40 mg  Every 24 hours         24     IP VTE HIGH RISK PATIENT  Once         24     Place sequential compression device  Until discontinued         24                    Disposition: PACU - hemodynamically stable.    Attestation: Good         Delivery Information for Gianluca Harmon    Birth information:  YOB: 2022   Time of birth: 9:31 PM   Sex: male   Head Delivery Date/Time: 2024  9:31 PM   Delivery type: , Low Transverse   Gestational Age: 39w6d        Delivery Providers    Delivering clinician: Endy  "Sita HILARIO MD   Provider Role    Rukhsana Cheney MD Resident    Cara Naidu RN Circulator    Stiven Cortez,  Scrub Person              Measurements    Weight: 3680 g  Weight (lbs): 8 lb 1.8 oz  Length: 52.1 cm  Length (in): 20.5"  Head circumference: 36.5 cm  Chest circumference: 35.5 cm         Apgars    Living status: Living  Apgar Component Scores:  1 min.:  5 min.:  10 min.:  15 min.:  20 min.:    Skin color:  0  1       Heart rate:  2  2       Reflex irritability:  2  2       Muscle tone:  2  2       Respiratory effort:  2  2       Total:  8  9       Apgars assigned by: NICU         Operative Delivery    Forceps attempted?: No  Vacuum extractor attempted?: No         Shoulder Dystocia    Shoulder dystocia present?: No           Presentation    Presentation: Vertex  Position: Left Occiput Anterior           Interventions/Resuscitation    Method: NICU Attended       Cord    Vessels: 3 vessels  Complications: None  Delayed Cord Clamping?: Yes  Cord Clamped Date/Time: 2024  9:32 PM  Cord Blood Disposition: Sent with Baby  Gases Sent?: No  Stem Cell Collection (by MD): No       Placenta    Placenta delivery date/time: 2024  Placenta removal: Manual removal  Placenta appearance: Intact  Placenta disposition: Discarded           Labor Events:       labor: No     Labor Onset Date/Time:         Dilation Complete Date/Time:         Start Pushing Date/Time:         Start Pushing Date/Time:       Rupture Date/Time: 24  09         Rupture type: ARM (Artificial Rupture)         Fluid Amount:       Fluid Color: Clear               steroids: None     Antibiotics given for GBS: No     Induction: balloon dilation (Bauer)     Indications for induction:  Elective     Augmentation: oxytocin     Indications for augmentation: Ineffective Contraction Pattern     Labor complications:       Additional complications:          Cervical ripening:                     Delivery:    "   Episiotomy:       Indication for Episiotomy:       Perineal Lacerations:   Repaired:      Periurethral Laceration:   Repaired:     Labial Laceration:   Repaired:     Sulcus Laceration:   Repaired:     Vaginal Laceration:   Repaired:     Cervical Laceration:   Repaired:     Repair suture:       Repair # of packets:       Last Value - EBL - Nursing (mL):       Sum - EBL - Nursing (mL): 0     Last Value - EBL - Anesthesia (mL):      Calculated QBL (mL): 890      Running total QBL (mL): 890      Vaginal Sweep Performed:       Surgicount Correct:       Vaginal Packing:   Quantity:       Other providers:       Anesthesia    Method: Epidural          Details (if applicable):  Trial of Labor Yes    Categorization: Primary    Priority: Routine   Indications for : Diagnosis of second stage arrest (no descent or rotation)   Incision Type: low transverse     Additional  information:  Forceps:    Vacuum:    Breech:    Observed anomalies    Other (Comments):         I was present and scrubbed for the entire procedure/operation and agree with above resident documentation.     Sita Schumacher

## 2024-07-17 NOTE — ANESTHESIA PROCEDURE NOTES
Epidural    Patient location during procedure: OB   Reason for block: primary anesthetic   Reason for block: labor analgesia requested by patient and obstetrician   Start time: 7/16/2024 6:45 PM  Timeout: 7/16/2024 6:40 PM  End time: 7/16/2024 6:55 PM  Surgery related to: Vaginal Delivery    Staffing  Performing Provider: Nemo Ferrer MD  Authorizing Provider: Bernie Kelly MD    Staffing  Performed by: Nemo Ferrer MD  Authorized by: Bernie Kelly MD        Preanesthetic Checklist  Completed: patient identified, IV checked, site marked, risks and benefits discussed, surgical consent, monitors and equipment checked, pre-op evaluation, timeout performed, anesthesia consent given, hand hygiene performed and patient being monitored  Preparation  Patient position: sitting  Prep: ChloraPrep  Patient monitoring: Pulse Ox  Reason for block: primary anesthetic   Epidural  Skin Anesthetic: lidocaine 1%  Skin Wheal: 3 mL  Administration type: continuous  Approach: midline  Interspace: L3-4    Injection technique: DANI saline  Needle and Epidural Catheter  Needle type: Tuohy   Needle gauge: 17  Needle length: 3.5 inches  Needle insertion depth: 8 cm  Catheter type: SalesGossip  Catheter size: 19 G  Catheter at skin depth: 12 cm  Insertion Attempts: 1  Test dose: 3 mL of lidocaine 1.5% with Epi 1-to-200,000  Additional Documentation: incremental injection, negative aspiration for heme and CSF, no paresthesia on injection, no signs/symptoms of IV or SA injection, no significant pain on injection and no significant complaints from patient  Needle localization: anatomical landmarks  Medications:  Volume per aspiration: 5 mL  Time between aspirations: 5 minutes   Assessment  Ease of block: easy  Patient's tolerance of the procedure: comfortable throughout block and no complaints  Additional Notes  Initial epidural was incidentally pulled out to 8 cm (where loss occurred) and patient had inadequate pain  control. Epidural was removed and replaced successfully.  No inadvertent dural puncture with Tuohy.  Dural puncture performed with spinal needle.

## 2024-07-17 NOTE — ANESTHESIA POSTPROCEDURE EVALUATION
Anesthesia Post Evaluation    Patient: Roxanne Harmon    Procedure(s) Performed: Procedure(s) (LRB):   SECTION (N/A)    Final Anesthesia Type: epidural      Patient location during evaluation: labor & delivery  Patient participation: Yes- Able to Participate  Level of consciousness: awake and alert  Post-procedure vital signs: reviewed and stable  Pain management: adequate  Airway patency: patent  MARLO mitigation strategies: Multimodal analgesia  PONV status at discharge: No PONV  Anesthetic complications: no      Cardiovascular status: stable and blood pressure returned to baseline  Respiratory status: unassisted, spontaneous ventilation and room air  Hydration status: euvolemic  Follow-up not needed.              Vitals Value Taken Time   /62 24 0829   Temp 36.4 °C (97.5 °F) 24 0829   Pulse 72 24 0829   Resp 16 24 0839   SpO2 96 % 24 0829         Event Time   Out of Recovery 2024 00:40:00         Pain/Ramirez Score: Pain Rating Prior to Med Admin: 2 (2024 10:09 AM)  Pain Rating Post Med Admin: 2 (2024 11:11 AM)

## 2024-07-18 PROBLEM — R03.0 ELEVATED BLOOD PRESSURE READING: Status: ACTIVE | Noted: 2024-07-18

## 2024-07-18 PROCEDURE — 11000001 HC ACUTE MED/SURG PRIVATE ROOM

## 2024-07-18 PROCEDURE — 25000003 PHARM REV CODE 250

## 2024-07-18 PROCEDURE — 63600175 PHARM REV CODE 636 W HCPCS

## 2024-07-18 RX ORDER — OXYCODONE AND ACETAMINOPHEN 5; 325 MG/1; MG/1
1 TABLET ORAL EVERY 4 HOURS PRN
Status: DISCONTINUED | OUTPATIENT
Start: 2024-07-18 | End: 2024-07-19 | Stop reason: HOSPADM

## 2024-07-18 RX ORDER — OXYCODONE AND ACETAMINOPHEN 10; 325 MG/1; MG/1
1 TABLET ORAL EVERY 4 HOURS PRN
Status: DISCONTINUED | OUTPATIENT
Start: 2024-07-18 | End: 2024-07-19 | Stop reason: HOSPADM

## 2024-07-18 RX ADMIN — IBUPROFEN 800 MG: 400 TABLET ORAL at 09:07

## 2024-07-18 RX ADMIN — PRENATAL VIT W/ FE FUMARATE-FA TAB 27-0.8 MG 1 TABLET: 27-0.8 TAB at 08:07

## 2024-07-18 RX ADMIN — OXYCODONE HYDROCHLORIDE AND ACETAMINOPHEN 1 TABLET: 10; 325 TABLET ORAL at 07:07

## 2024-07-18 RX ADMIN — IBUPROFEN 800 MG: 400 TABLET ORAL at 05:07

## 2024-07-18 RX ADMIN — LEVOTHYROXINE SODIUM 75 MCG: 25 TABLET ORAL at 05:07

## 2024-07-18 RX ADMIN — OXYCODONE HYDROCHLORIDE AND ACETAMINOPHEN 1 TABLET: 5; 325 TABLET ORAL at 09:07

## 2024-07-18 RX ADMIN — ENOXAPARIN SODIUM 40 MG: 40 INJECTION SUBCUTANEOUS at 06:07

## 2024-07-18 RX ADMIN — ACETAMINOPHEN 650 MG: 325 TABLET, FILM COATED ORAL at 01:07

## 2024-07-18 RX ADMIN — OXYCODONE HYDROCHLORIDE AND ACETAMINOPHEN 1 TABLET: 10; 325 TABLET ORAL at 01:07

## 2024-07-18 RX ADMIN — IBUPROFEN 800 MG: 400 TABLET ORAL at 01:07

## 2024-07-18 RX ADMIN — DOCUSATE SODIUM 200 MG: 100 CAPSULE, LIQUID FILLED ORAL at 09:07

## 2024-07-18 RX ADMIN — DOCUSATE SODIUM 200 MG: 100 CAPSULE, LIQUID FILLED ORAL at 08:07

## 2024-07-18 NOTE — LACTATION NOTE
07/18/24 1030   Breasts WDL   Breast WDL WDL   Breast Pumping   Breast Pumping double electric breast pump utilized   Breast Pumping Interventions post-feed pumping encouraged   Maternal Feeding Assessment   Maternal Emotional State independent   Infant Positioning cross-cradle   Signs of Milk Transfer infant jaw motion present   Pain with Feeding no   Latch Assistance yes   Reproductive Interventions   Breast Care: Breastfeeding open to air   Breastfeeding Assistance feeding cue recognition promoted;feeding on demand promoted;infant latch-on verified;feeding session observed;infant suck/swallow verified   Breastfeeding Support maternal rest encouraged;maternal nutrition promoted;maternal hydration promoted;lactation counseling provided;infant-mother separation minimized;encouragement provided;diary/feeding log utilized     Lactation note: Lactation rounds. Pt currently nursing infant in cross cradle hold. Continues to pump post feeding secondary to history of breast reduction surgery. Latch is wide, good tugs and pulls, rhythmic  and swallows noted.

## 2024-07-18 NOTE — PLAN OF CARE
Patient safety maintained, side rails up x2, bed low and locked position. Pt ambulating and voiding independently.  Pain well controlled with scheduled pain medication. Fundus midline, firm, with light lochia. Patient responding to infant cues.  Hydrocolloid dressing dry, intact, with scant amount of dried drainage. Significant other at bedside and assisting in patient's care. S/p ABX.

## 2024-07-18 NOTE — PLAN OF CARE
Follow basic education. Continue to pump post feeding for stimulation secondary to reduction surgery.

## 2024-07-18 NOTE — DISCHARGE INSTRUCTIONS
Community Resources for Breastfeeding Mothers:   Hospital Breastfeeding Centers/ Lactation Consultants:   [facility]........................................................................................[phone]   [facility]....................................................................................[phone]   [facility]..........................[phone]   [facility].................................................[phone]   [facility].......................................................[phone]   [facility].................................................................[phone]   [facility].......................................................................[phone]   [facility]..................................................[phone]   [facility].....................................................................[phone]      [facility] (Poison Control)...........................................................[phone]    PoisonHelp.org   Free medical advice 24/7 through the Poison Help Line and the online tool      Online Resources:   International Breastfeeding [place] ...............................................................................ibconline.ca   Dr Chidi Lim online resource provides videos, articles, and information sheets.     Coeffective...............................................................................................................coeffective.com   Download the free mobile kevin to help get off to a great start with breastfeeding.   Droplet.....................................................................................................................Sharethrough.com   Global Health  Media...........................................................................................Wizzard Software.org   Videos that teach and empower mothers and caregivers   Infant [facility].............................................................................[phone]      TrueView   Provides up to date information for medication use by moms during pregnancy and while breastfeeding.   Cadence Byrd....................................................................................................................kellymom.com   Provides online information on breastfeeding and parenting      La Leche League........................................................................................ lllalmsla.org   /   llli.org   Mother-to-mother support groups with education, information support, and encouragement    Work and Pump........................................................................................... digedu.com   Information about breastfeeding for working moms     [place] Resources:   [place] Breastfeeding [facility]............................... [phone]    [facility] Centerfeeding.[facility]   Find local breastfeeding support   [place] Breastfeeding Support............................................................ LaBreastfeedingSport.org   Zip code search of breastfeeding resources in your area   Partners for Healthy Babies............................................................[phone]   9425514fvby.org   Connects [place] moms and their families to health and pregnancy resources.  24/7   WIC (Women, Infant, Children)......................................................... [phone]   ldh.la.gov/WIC   Download the registracija vozila kevin, get code from WIC office    [facility] Resources:     Baby Cafe............................................................................................................. babycafeusa.org   Free, drop in, informal  breastfeeding support groups offering professional lactation care and intervention.    [facility] [place] [facility]....................................... birthmarkAHAlife.com.com   Infant feeding drop in clinics, Lactation services, support groups, education programs   Cafe [facility]...............................................[phone]   Naonext.com/groups/[facility]   Free breastfeeding support group for families of color   Mothers Milk Bank [facility]....................................................[phone]                                                             MultiplysSystems Integration.Fitbay/services/[facility]s-milk-bank-[facility]   JANIE Nesting..................................................................................[phone] nolanesting.com   In person and virtual support for families through pregnancy, birth, and early parenthood.       [facility] [place]- Dr. Jessica Castillo.......................[phone]   [address] [place] [address]                                  [email or web address]   [email or web address]   [facility]isto Lactation Care, [facility] (Twyla Gagnon RN, IBCLC) ............................[phone] [email or web address]    Healthy Start [place].....................................[phone] ([place])  [phone] ([place])   [facility].gov/health-department/healthy-start   Serves women of childbearing age and addresses issues for pregnant women and their children from birth  to age two.          La Leche League- [place]............................. Medgenome Labs.Semadic/ Naonext.Semadic/ Baynotejani   In person and virtual mother to mother support groups with education, information support and   encouragement to women who want to breastfeed      [place] Resources:   Breastfeeding Resources- [facility]  Health.....msdh.ms.gov (under womens services)   Find resources and info about planning for breastfeeding, its benefits, and help with breastfeeding  s uccessfully.    Center For Pregnancy Choices- [place]....................................... Mygeni   [phone]   [address] [place], [place]. Call or text 24/7   [facility].......................................................Wowoastbreastfeedingcenter.RestoMesto   [facility] Lactation Consultants sere [facility], including [facility],   [facility], and surrounding areas.    [place] Breastfeeding Coalition...............................................................................msbfc.org   Promotes and supports breastfeeding with families, health providers, and communities.   [facility] breastfeeding Coalition.....................................................................smbfc.org   Find breastfeeding resources and support groups in your area.    WIC Nutrition Program- [facility] of Health.................................... [place].gov

## 2024-07-18 NOTE — MEDICAL/APP STUDENT
"POSTPARTUM PROGRESS NOTE    Subjective:     PPD/POD#: 2   Procedure: Primary LTCS   EGA: 39w6d   N/V: No   F/C: No   Abd Pain: Mild, well-controlled with oral pain medication   Lochia: Mild   Voiding: Yes   Ambulating: Yes   Bowel fnc: Yes   Contraception: Per primary OB     Objective:      Temp:  [97.4 °F (36.3 °C)-98.4 °F (36.9 °C)] 98.1 °F (36.7 °C)  Pulse:  [72-95] 90  Resp:  [16-18] 18  SpO2:  [95 %-97 %] 95 %  BP: ()/(56-74) 98/59    Abdomen: Soft, appropriately tender   Uterus: Firm, no fundal tenderness   Incision: Bandage in place without shadowing     Lab Review    No results for input(s): "NA", "K", "CL", "CO2", "BUN", "CREATININE", "GLU", "PROT", "BILITOT", "ALKPHOS", "ALT", "AST", "MG", "PHOS" in the last 168 hours.    Recent Labs   Lab 07/16/24  0037 07/17/24  0925   WBC 11.13 22.11*   HGB 14.3 11.8*   HCT 41.9 33.6*   MCV 91 90    232         I/O    Intake/Output Summary (Last 24 hours) at 7/18/2024 0620  Last data filed at 7/17/2024 1830  Gross per 24 hour   Intake --   Output 3000 ml   Net -3000 ml        Assessment and Plan:   Postpartum care:  - Patient doing well.  - Continue routine management and advances.    Hypothyroidism  - continue home synthroid     Elevated BPx1  - VS as above  - currently asymptomatic  - will continue to monitor     Rh negative  - Baby: O neg    Chorioamnionitis  - VS as above  - currently afebrile  - no fundal tenderness  - s/p amp and gent    Victoria Sanchez, MS3  [employer]    I have seen and examined the patient and agree with the medical student note above.     Helena Talley MD  Obstetrics & Gynecology, PGY-1     A student assisted me with documenting this service.  I saw the patient and performed the history, physical exam, and medical decision making.  I reviewed and verified all information documented by the student and made modifications to such documentation when appropriate.  -- JOSE Calles M.D.  "

## 2024-07-19 ENCOUNTER — PATIENT MESSAGE (OUTPATIENT)
Dept: LACTATION | Facility: CLINIC | Age: 34
End: 2024-07-19
Payer: COMMERCIAL

## 2024-07-19 VITALS
HEIGHT: 67 IN | WEIGHT: 210.75 LBS | RESPIRATION RATE: 16 BRPM | HEART RATE: 93 BPM | BODY MASS INDEX: 33.08 KG/M2 | DIASTOLIC BLOOD PRESSURE: 74 MMHG | TEMPERATURE: 99 F | SYSTOLIC BLOOD PRESSURE: 109 MMHG | OXYGEN SATURATION: 97 %

## 2024-07-19 PROCEDURE — 25000003 PHARM REV CODE 250

## 2024-07-19 RX ORDER — IBUPROFEN 800 MG/1
800 TABLET ORAL EVERY 8 HOURS
Qty: 30 TABLET | Refills: 1 | Status: SHIPPED | OUTPATIENT
Start: 2024-07-19

## 2024-07-19 RX ORDER — DOCUSATE SODIUM 100 MG/1
200 CAPSULE, LIQUID FILLED ORAL 2 TIMES DAILY
Qty: 60 CAPSULE | Refills: 1 | Status: SHIPPED | OUTPATIENT
Start: 2024-07-19

## 2024-07-19 RX ORDER — DEXTROMETHORPHAN HYDROBROMIDE, GUAIFENESIN 5; 100 MG/5ML; MG/5ML
650 LIQUID ORAL EVERY 8 HOURS
Qty: 30 TABLET | Refills: 1 | Status: SHIPPED | OUTPATIENT
Start: 2024-07-19

## 2024-07-19 RX ORDER — OXYCODONE HYDROCHLORIDE 5 MG/1
5 TABLET ORAL EVERY 4 HOURS PRN
Qty: 15 TABLET | Refills: 0 | Status: SHIPPED | OUTPATIENT
Start: 2024-07-19

## 2024-07-19 RX ADMIN — IBUPROFEN 800 MG: 400 TABLET ORAL at 05:07

## 2024-07-19 RX ADMIN — IBUPROFEN 800 MG: 400 TABLET ORAL at 02:07

## 2024-07-19 RX ADMIN — OXYCODONE HYDROCHLORIDE AND ACETAMINOPHEN 1 TABLET: 5; 325 TABLET ORAL at 11:07

## 2024-07-19 RX ADMIN — DOCUSATE SODIUM 200 MG: 100 CAPSULE, LIQUID FILLED ORAL at 08:07

## 2024-07-19 RX ADMIN — PRENATAL VIT W/ FE FUMARATE-FA TAB 27-0.8 MG 1 TABLET: 27-0.8 TAB at 08:07

## 2024-07-19 RX ADMIN — LEVOTHYROXINE SODIUM 75 MCG: 25 TABLET ORAL at 05:07

## 2024-07-19 NOTE — LACTATION NOTE
07/19/24 1045   Breasts WDL   Breast WDL WDL   Breast Pumping   Breast Pumping double electric breast pump utilized   Breast Pumping Interventions post-feed pumping encouraged   Maternal Feeding Assessment   Maternal Emotional State independent   Infant Positioning cross-cradle   Signs of Milk Transfer infant jaw motion present   Pain with Feeding no   Latch Assistance no   Reproductive Interventions   Breast Care: Breastfeeding open to air   Breastfeeding Assistance feeding cue recognition promoted;feeding on demand promoted;feeding session observed;infant latch-on verified;infant suck/swallow verified;support offered;supplemental feeding provided   Breastfeeding Support maternal rest encouraged;maternal nutrition promoted;maternal hydration promoted;lactation counseling provided;infant-mother separation minimized;encouragement provided;diary/feeding log utilized     Lactation note. Lactation discharge education reviewed. Pt latching infant without difficulty. Continue to nurse, pump and supplement post feeding secondary to 10% weight loss.   [facility] follow scheduled for 7-31-24 for pre and post feed assessment.

## 2024-07-19 NOTE — PLAN OF CARE
Pt VSS. Pain controlled with scheduled and PRN pain medication. Breastfeeding, pumping, and supplementing with donor milk. Fundus firm and midline with light lochia rubra. LTV dressing in place, dry/intact with scant dried drainage. Voiding spontaneously with adequate output. Passing gas. DCT and LCT done. Pt to be discharged home.    Problem:  Fall Injury Risk  Goal: Absence of Fall, Infant Drop and Related Injury  Outcome: Met     Problem: Breastfeeding  Goal: Effective Breastfeeding  Outcome: Met     Problem: Adult Inpatient Plan of Care  Goal: Plan of Care Review  Outcome: Met  Goal: Patient-Specific Goal (Individualized)  Outcome: Met  Goal: Absence of Hospital-Acquired Illness or Injury  Outcome: Met  Goal: Optimal Comfort and Wellbeing  Outcome: Met  Goal: Readiness for Transition of Care  Outcome: Met     Problem: Infection  Goal: Absence of Infection Signs and Symptoms  Outcome: Met     Problem: Skin Injury Risk Increased  Goal: Skin Health and Integrity  Outcome: Met     Problem: Wound  Goal: Optimal Coping  Outcome: Met  Goal: Optimal Functional Ability  Outcome: Met  Goal: Absence of Infection Signs and Symptoms  Outcome: Met  Goal: Improved Oral Intake  Outcome: Met  Goal: Optimal Pain Control and Function  Outcome: Met  Goal: Skin Health and Integrity  Outcome: Met  Goal: Optimal Wound Healing  Outcome: Met     Problem: Postpartum ( Delivery)  Goal: Successful Parent Role Transition  Outcome: Met  Goal: Hemostasis  Outcome: Met  Goal: Effective Bowel Elimination  Outcome: Met  Goal: Fluid and Electrolyte Balance  Outcome: Met  Goal: Absence of Infection Signs and Symptoms  Outcome: Met  Goal: Anesthesia/Sedation Recovery  Outcome: Met  Goal: Optimal Pain Control and Function  Outcome: Met  Goal: Nausea and Vomiting Relief  Outcome: Met  Goal: Effective Urinary Elimination  Outcome: Met  Goal: Effective Oxygenation and Ventilation  Outcome: Met

## 2024-07-19 NOTE — DISCHARGE SUMMARY
Delivery Discharge Summary  Obstetrics      Primary OB Clinician: Sita Schuamcher MD     Admission date: 2024  Discharge date: 2024    Disposition: To home, self care    Discharge Diagnosis List:      Patient Active Problem List   Diagnosis    Bee sting allergy    Acute left ankle pain    Open left ankle fracture, sequela    Amenorrhea    Rh negative status during pregnancy in first trimester    Rh negative status during pregnancy in third trimester    Hypothyroidism    S/P primary low transverse     Elevated blood pressure reading       Procedure: , due to arrest of descent    Hospital Course:  Roxanne Harmon is a 34 y.o. now , POD #3 who was admitted on 2024 at 39w6d for IOL. Patient was subsequently admitted to labor and delivery unit with signed consents. This IUP was complicated by Rh-, hypothyroidism (continued on home synthroid), elevated BP x1.     Labor course was complicated by arrest of descent, and decision was made to proceed with delivery via  which was performed without complications. Patient was diagnosed with an IAI and she is status post 24 hours of ampicillin, gentamicin and clindamycin. She received lovenox in the postpartum period for a  section + IAI.     Please see delivery note for further details. Her postpartum course was otherwise complicated. Patient did not meet criteria for any hypertensive diagnoses in pregnancy. On discharge day, patient's pain is controlled with oral pain medications. Pt is tolerating ambulation without SOB or CP, and regular diet without N/V. Reports lochia is mild. Denies any HA, vision changes, F/C, LE swelling. Denies any breast pain/soreness.    Pt in stable condition and ready for discharge. She has been instructed to start and/or continue medications and follow up with her obstetrics provider as listed below.    Pertinent studies:  CBC  Recent Labs   Lab 24  0037 24  0925   WBC 11.13  "22.11*   HGB 14.3 11.8*   HCT 41.9 33.6*   MCV 91 90    232      Immunization History   Administered Date(s) Administered    COVID-19, MRNA, LN-S, PF (Pfizer) (Purple Cap) 2020, 2021, 2021    Influenza 10/04/2018    Influenza - Quadrivalent - PF *Preferred* (6 months and older) 10/26/2016, 10/26/2017, 10/11/2018, 2019, 11/10/2020, 10/11/2021    Rho (D) Immune Globulin - IM 2024    Tdap 2022, 2024        Delivery:    Episiotomy:     Lacerations:     Repair suture:     Repair # of packets:     Blood loss (ml):       Birth information:  YOB: 2022   Time of birth: 9:31 PM   Sex: male   Delivery type: , Low Transverse   Gestational Age: 39w6d     Measurements    Weight: 3680 g  Weight (lbs): 8 lb 1.8 oz  Length: 52.1 cm  Length (in): 20.5"  Head circumference: 36.5 cm  Chest circumference: 35.5 cm         Delivery Clinician: Delivery Providers    Delivering clinician: Sita Schumacher MD   Provider Role    Rukhsana Cheney MD Resident    Cara Naidu RN Circulator    Stiven Cortez ST Scrub Person             Additional  information:  Forceps:    Vacuum:    Breech:    Observed anomalies      Living?:     Apgars    Living status: Living  Apgar Component Scores:  1 min.:  5 min.:  10 min.:  15 min.:  20 min.:    Skin color:  0  1       Heart rate:  2  2       Reflex irritability:  2  2       Muscle tone:  2  2       Respiratory effort:  2  2       Total:  8  9       Apgars assigned by: NICU         Placenta: Delivered:       appearance    Patient Instructions:   Current Discharge Medication List        START taking these medications    Details   acetaminophen (TYLENOL) 650 MG TbSR Take 1 tablet (650 mg total) by mouth every 8 (eight) hours.  Qty: 30 tablet, Refills: 1      docusate sodium (COLACE) 100 MG capsule Take 2 capsules (200 mg total) by mouth 2 (two) times daily.  Qty: 60 capsule, Refills: 1      ibuprofen (ADVIL,MOTRIN) 800 MG " tablet Take 1 tablet (800 mg total) by mouth every 8 (eight) hours.  Qty: 30 tablet, Refills: 1      oxyCODONE (ROXICODONE) 5 MG immediate release tablet Take 1 tablet (5 mg total) by mouth every 4 (four) hours as needed for Pain.  Qty: 15 tablet, Refills: 0    Comments: Quantity prescribed more than 7 day supply? No           CONTINUE these medications which have NOT CHANGED    Details   EPINEPHrine (EPIPEN 2-BUCK) 0.3 mg/0.3 mL AtIn Inject 0.3 mLs (0.3 mg total) into the muscle once as needed (anaphylaxis).  Qty: 2 each, Refills: 3    Associated Diagnoses: Bee sting allergy      levothyroxine (SYNTHROID) 75 MCG tablet Take 1 tablet (75 mcg total) by mouth before breakfast.  Qty: 90 tablet, Refills: 3    Associated Diagnoses: Subclinical hypothyroidism      -iron fum-folic acid (PRENATAL 19) 29 mg iron- 1 mg Tab Take by mouth.           STOP taking these medications       BABY ASPIRIN ORAL Comments:   Reason for Stopping:               Discharge Procedure Orders   Diet Adult Regular     No driving until:   Order Comments: No driving until not taking narcotic pain medication.     Pelvic Rest   Order Comments: Pelvic rest until 6 weeks after discharge. Nothing in vagina -no sex, tampons, douching, etc.     Notify your health care provider if you experience any of the following:  temperature >100.4     Notify your health care provider if you experience any of the following:  persistent nausea and vomiting or diarrhea     Notify your health care provider if you experience any of the following:  severe uncontrolled pain     Notify your health care provider if you experience any of the following:  redness, tenderness, or signs of infection (pain, swelling, redness, odor or green/yellow discharge around incision site)     Notify your health care provider if you experience any of the following:  difficulty breathing or increased cough     Notify your health care provider if you experience any of the following:   severe persistent headache     Notify your health care provider if you experience any of the following:  worsening rash     Notify your health care provider if you experience any of the following:  persistent dizziness, light-headedness, or visual disturbances     Notify your health care provider if you experience any of the following:  increased confusion or weakness     Notify your health care provider if you experience any of the following:   Order Comments: Heavy vaginal bleeding saturating more than 1 pad per hr for at least consecutive 2 hrs.     Activity as tolerated        Follow-up Information       Sita Schumacher MD Follow up in 6 week(s).    Specialties: Obstetrics and Gynecology, Obstetrics  Why: Post partum visit  Contact information:  [address] [id number] [phone]                            Betty Naqvi MD  Obstetrics and Gynecology - PGY2

## 2024-07-19 NOTE — PLAN OF CARE
Patient safety maintained, side rails up x2, bed low and locked position. Pt ambulating and voiding independently.  Pain well controlled with scheduled and PRN pain medication. Fundus midline, firm, with light lochia. Patient responding to infant cues.  Hydrocolloid dressing dry, intact, with scant drainage.

## 2024-07-23 ENCOUNTER — TELEPHONE (OUTPATIENT)
Facility: CLINIC | Age: 34
End: 2024-07-23
Payer: COMMERCIAL

## 2024-07-31 ENCOUNTER — PATIENT MESSAGE (OUTPATIENT)
Dept: OBSTETRICS AND GYNECOLOGY | Facility: CLINIC | Age: 34
End: 2024-07-31
Payer: COMMERCIAL

## 2024-08-11 ENCOUNTER — OFFICE VISIT (OUTPATIENT)
Dept: URGENT CARE | Facility: CLINIC | Age: 34
End: 2024-08-11
Payer: COMMERCIAL

## 2024-08-11 VITALS
HEIGHT: 67 IN | DIASTOLIC BLOOD PRESSURE: 68 MMHG | TEMPERATURE: 98 F | SYSTOLIC BLOOD PRESSURE: 98 MMHG | HEART RATE: 97 BPM | BODY MASS INDEX: 33.01 KG/M2 | OXYGEN SATURATION: 98 %

## 2024-08-11 DIAGNOSIS — N61.0 MASTITIS: Primary | ICD-10-CM

## 2024-08-11 PROCEDURE — 99213 OFFICE O/P EST LOW 20 MIN: CPT | Mod: ,,, | Performed by: FAMILY MEDICINE

## 2024-08-11 RX ORDER — CEPHALEXIN 500 MG/1
500 CAPSULE ORAL EVERY 6 HOURS
Qty: 28 CAPSULE | Refills: 0 | Status: SHIPPED | OUTPATIENT
Start: 2024-08-11 | End: 2024-08-18

## 2024-08-11 NOTE — PROGRESS NOTES
"Subjective:      Patient ID: Roxanne Harmon is a 34 y.o. female.    Vitals:  height is 5' 7" (1.702 m). Her tympanic temperature is 97.8 °F (36.6 °C). Her blood pressure is 98/68 and her pulse is 97. Her oxygen saturation is 98%.     Chief Complaint: Fever (Mastitis - Entered by patient)    34 y.o female c/o red painful tender left breast. Patient reports that she breastfeeding and noticed that she started running a fever last night. Patient reports that she been taking Advil 800 mg.        Constitution: Positive for fever (responded to ibuprofen). Negative for activity change, appetite change, chills and sweating.   Cardiovascular:  Negative for chest pain and sob on exertion.   Respiratory:  Negative for cough.    Genitourinary:  Negative for dysuria and frequency.      Objective:     Vitals:    08/11/24 1006   BP: 98/68   BP Location: Left arm   Patient Position: Sitting   Pulse: 97   Temp: 97.8 °F (36.6 °C)   TempSrc: Tympanic   SpO2: 98%   Height: 5' 7" (1.702 m)      Physical Exam   Pulmonary/Chest: She exhibits tenderness (red, tender, warm left breast).   Neurological: She is alert.   Psychiatric: Thought content normal.       Assessment:     1. Mastitis        Plan:       Mastitis  -     cephALEXin (KEFLEX) 500 MG capsule; Take 1 capsule (500 mg total) by mouth every 6 (six) hours. for 7 days  Dispense: 28 capsule; Refill: 0         Mastitis Discharge Instructions   About this topic   Mastitis is a problem where the breast tissue is swollen and sore. You may have an infection in the breast tissue. You can have mastitis in one or both breasts. Mastitis most often happens in just one breast. Many women have mastitis during the first 6 weeks of breastfeeding. Even with mastitis, breastfeeding your baby is safe.  What care is needed at home?   Ask your doctor what you need to do when you go home. Make sure you ask questions if you do not understand what you need to do.  Apply heat to the affected breast 15 " to 20 minutes before breastfeeding.  Warm baths may also help.  Apply a cold compress after breastfeeding. The cold compress may help with pain or swelling. Avoid using a cold compress before breastfeeding, because this may slow down milk flow.  Wear a supportive bra.  Use different positions when breastfeeding.  Empty the milk from your breast while breastfeeding. If your baby does not nurse well on that breast you may need to use a breast pump or hand express.  Drink 8 to 10 glasses of water per day.  Rest often.  What follow-up care is needed?   Your doctor may ask you to make visits to the office to check on your progress. Be sure to keep your visits.  What drugs may be needed?   The doctor may order drugs to:  Help with pain and swelling  Fight an infection  Will physical activity be limited?   No, physical activities may not be limited. You may still breastfeed your baby.  What problems could happen?   If the infection is not treated right away, there may be a collection of pus in the breast. In this case, you may be asked to stop breastfeeding from the affected breast for a short time. You can still breastfeed from the unaffected side.  What can be done to prevent this health problem?   Ask your doctor for tips and advice on proper breastfeeding.  Always wash your hands and nipples before breastfeeding.  Start feeding on the left breast one time. Next time, start with the right breast.  Try not to miss any feeding sessions. If you do, use a pump to prevent the breast from overfilling with milk.  When weaning the baby from breastfeeding, wean slowly over a few weeks.  Keep your nipples dry when you are not breastfeeding. Do not use breast pads that stay wet when you are not breastfeeding.  When do I need to call the doctor?   Signs of infection such as a fever of 100.4°F (38°C) or higher, chills.  Breast pain or soreness  Swelling  Redness, sometimes in a wedge shape  Burning feeling during  breastfeeding  Flu-like signs, such as fever, chills, feeling tired, upset stomach, throwing up  Lump or area of hardness in the breast  Nipple changes, such as discharge or numbness  Enlarged and tender lymph nodes in the armpit area on the same side of the mastitis  You are not feeling better in 2 to 3 days or you are feeling worse  Helpful tips   Be sure to keep breastfeeding or pumping during mastitis. Leaving too much milk in the breast can make the infection worse.  Teach Back: Helping You Understand   The Teach Back Method helps you understand the information we are giving you. After you talk with the staff, tell them in your own words what you learned. This helps to make sure the staff has described each thing clearly. It also helps to explain things that may have been confusing. Before going home, make sure you can do these:  I can tell you about my condition.  I can tell you how to care for my breasts.  I can tell you what I will do if I have a fever, discharge from my breast, or my breast or armpit is swollen and tender.  Where can I learn more?   American Academy [employer]  https://familydoctor.org/condition/mastitis/   [facility] Choices  http://www.nhs.uk/conditions/mastitis/pages/introduction.aspx   Last Reviewed Date   2020-07-07  Consumer Information Use and Disclaimer   This information is not specific medical advice and does not replace information you receive from your health care provider. This is only a brief summary of general information. It does NOT include all information about conditions, illnesses, injuries, tests, procedures, treatments, therapies, discharge instructions or life-style choices that may apply to you. You must talk with your health care provider for complete information about your health and treatment options. This information should not be used to decide whether or not to accept your health care providers advice, instructions or recommendations. Only your health care  provider has the knowledge and training to provide advice that is right for you.  Copyright   Copyright © 2021 [employer] and its affiliates and/or licensors. All rights reserved.

## 2024-08-19 ENCOUNTER — PATIENT MESSAGE (OUTPATIENT)
Dept: OBSTETRICS AND GYNECOLOGY | Facility: CLINIC | Age: 34
End: 2024-08-19
Payer: COMMERCIAL

## 2024-08-19 DIAGNOSIS — N63.0 BREAST MASS IN FEMALE: Primary | ICD-10-CM

## 2024-08-29 ENCOUNTER — PATIENT MESSAGE (OUTPATIENT)
Dept: OBSTETRICS AND GYNECOLOGY | Facility: CLINIC | Age: 34
End: 2024-08-29
Payer: COMMERCIAL

## 2024-08-29 ENCOUNTER — HOSPITAL ENCOUNTER (OUTPATIENT)
Dept: RADIOLOGY | Facility: OTHER | Age: 34
Discharge: HOME | End: 2024-08-29
Attending: OBSTETRICS & GYNECOLOGY
Payer: COMMERCIAL

## 2024-08-29 DIAGNOSIS — N63.20 MASS OF LEFT BREAST, UNSPECIFIED QUADRANT: Primary | ICD-10-CM

## 2024-08-29 DIAGNOSIS — N63.0 BREAST MASS IN FEMALE: ICD-10-CM

## 2024-08-29 PROCEDURE — 76642 ULTRASOUND BREAST LIMITED: CPT | Mod: TC,LT

## 2024-09-04 ENCOUNTER — PATIENT MESSAGE (OUTPATIENT)
Dept: OBSTETRICS AND GYNECOLOGY | Facility: CLINIC | Age: 34
End: 2024-09-04
Payer: COMMERCIAL

## 2024-09-06 DIAGNOSIS — E03.8 SUBCLINICAL HYPOTHYROIDISM: ICD-10-CM

## 2024-09-06 RX ORDER — LEVOTHYROXINE SODIUM 75 UG/1
75 TABLET ORAL
Qty: 90 TABLET | Refills: 3 | Status: SHIPPED | OUTPATIENT
Start: 2024-09-06 | End: 2025-09-06

## 2024-09-06 NOTE — TELEPHONE ENCOUNTER
Refill Routing Note   Medication(s) are not appropriate for processing by [facility] for the following reason(s):        Outside of protocol  ED/Hospital Visit since last OV with provider    [facility] action(s):  Route      Medication Therapy Plan: PT IS LACTATING    Extended chart review required: Yes     Appointments  past 12m or future 3m with PCP    Date Provider   Last Visit   6/27/2023 Timoteo Lund MD   Next Visit   10/15/2024 Timoteo Lund MD   ED visits in past 90 days: 0        Note composed:3:32 PM 09/06/2024

## 2024-09-06 NOTE — TELEPHONE ENCOUNTER
No care due was identified.  [facility] Embedded Care Due Messages. Reference number: [id number].   9/06/2024 10:23:20 AM CDT

## 2024-09-10 ENCOUNTER — POSTPARTUM VISIT (OUTPATIENT)
Dept: OBSTETRICS AND GYNECOLOGY | Facility: CLINIC | Age: 34
End: 2024-09-10
Payer: COMMERCIAL

## 2024-09-10 ENCOUNTER — LAB VISIT (OUTPATIENT)
Dept: LAB | Facility: OTHER | Age: 34
End: 2024-09-10
Attending: OBSTETRICS & GYNECOLOGY
Payer: COMMERCIAL

## 2024-09-10 VITALS
SYSTOLIC BLOOD PRESSURE: 118 MMHG | BODY MASS INDEX: 30.04 KG/M2 | HEIGHT: 67 IN | WEIGHT: 191.38 LBS | DIASTOLIC BLOOD PRESSURE: 70 MMHG

## 2024-09-10 DIAGNOSIS — E03.8 OTHER SPECIFIED HYPOTHYROIDISM: ICD-10-CM

## 2024-09-10 LAB
T4 FREE SERPL-MCNC: 1.15 NG/DL (ref 0.71–1.51)
TSH SERPL DL<=0.005 MIU/L-ACNC: 0.28 UIU/ML (ref 0.4–4)

## 2024-09-10 PROCEDURE — 84439 ASSAY OF FREE THYROXINE: CPT | Performed by: OBSTETRICS & GYNECOLOGY

## 2024-09-10 PROCEDURE — 99999 PR PBB SHADOW E&M-EST. PATIENT-LVL III: CPT | Mod: PBBFAC,,, | Performed by: OBSTETRICS & GYNECOLOGY

## 2024-09-10 PROCEDURE — 36415 COLL VENOUS BLD VENIPUNCTURE: CPT | Performed by: OBSTETRICS & GYNECOLOGY

## 2024-09-10 PROCEDURE — 0503F POSTPARTUM CARE VISIT: CPT | Mod: CPTII,S$GLB,, | Performed by: OBSTETRICS & GYNECOLOGY

## 2024-09-10 PROCEDURE — 84443 ASSAY THYROID STIM HORMONE: CPT | Performed by: OBSTETRICS & GYNECOLOGY

## 2024-09-10 NOTE — PROGRESS NOTES
"CC: Post-partum follow-up    Roxanne Harmon is a 34 y.o. female  presents for post-partum visit s/p a , due to arrest of descent .    Delivery Date: 2024  Delivery MD: Sita Schumacher  Gender: male  Birth Weight: 8 pounds 1 ounces  Breast Feeding: NO  Depression: NO  Contraception: no method    Pregnancy was complicated by: None      /70   Ht 5' 7" (1.702 m)   Wt 86.8 kg (191 lb 5.8 oz)   LMP 10/07/2023   Breastfeeding No   BMI 29.97 kg/m²     ROS:  GENERAL: No fever, chills, fatigability or weight loss.  VULVAR: No pain, no lesions and no itching.  VAGINAL: No relaxation, no itching, no discharge, no abnormal bleeding and no lesions.  ABDOMEN: No abdominal pain. Denies nausea. Denies vomiting. No diarrhea. No constipation  BREAST: Denies pain. No lumps. No discharge.  URINARY: No incontinence, no nocturia, no frequency and no dysuria.  CARDIOVASCULAR: No chest pain. No shortness of breath. No leg cramps.  NEUROLOGICAL: No headaches. No vision changes.    PHYSICAL EXAM:  PELVIC: EGBUS within normal limits, normal vagina and vulva  ABDOMEN: soft, NT, ND. Incision well healed.   BREASTS: In left breast, hard mass still present just above the nipple. U/s imaging already done -- nonvascular, hyperechoic material within a ductal system.       Diagnosis:  1. Routine postpartum follow-up    2. Other specified hypothyroidism        Plan:     Orders Placed This Encounter    TSH    T4, Free     - repeat breast imaging in 3 months. If mass still present, will refer to Dr. Ford for surgical removal.   - thyroid labs. Meds dose not changed.     Patient was counseled today on A.C.S. Pap guidelines and recommendations for yearly pelvic exams and monthly self breast exams; to see her PCP for other health maintenance.    FOLLOW UP: in 1 year for routine exam    "

## 2024-09-30 ENCOUNTER — PATIENT MESSAGE (OUTPATIENT)
Dept: OBSTETRICS AND GYNECOLOGY | Facility: CLINIC | Age: 34
End: 2024-09-30
Payer: COMMERCIAL

## 2024-10-02 ENCOUNTER — OFFICE VISIT (OUTPATIENT)
Dept: SURGERY | Facility: CLINIC | Age: 34
End: 2024-10-02
Payer: COMMERCIAL

## 2024-10-02 VITALS
HEIGHT: 67 IN | HEART RATE: 111 BPM | BODY MASS INDEX: 29.98 KG/M2 | DIASTOLIC BLOOD PRESSURE: 86 MMHG | WEIGHT: 191 LBS | SYSTOLIC BLOOD PRESSURE: 127 MMHG

## 2024-10-02 DIAGNOSIS — Z12.39 ENCOUNTER FOR SCREENING BREAST EXAMINATION: ICD-10-CM

## 2024-10-02 DIAGNOSIS — N63.25 BREAST LUMP ON LEFT SIDE AT 12 O'CLOCK POSITION: Primary | ICD-10-CM

## 2024-10-02 PROCEDURE — 99204 OFFICE O/P NEW MOD 45 MIN: CPT | Mod: S$GLB,,, | Performed by: NURSE PRACTITIONER

## 2024-10-02 PROCEDURE — 3008F BODY MASS INDEX DOCD: CPT | Mod: CPTII,S$GLB,, | Performed by: NURSE PRACTITIONER

## 2024-10-02 PROCEDURE — 3074F SYST BP LT 130 MM HG: CPT | Mod: CPTII,S$GLB,, | Performed by: NURSE PRACTITIONER

## 2024-10-02 PROCEDURE — 1159F MED LIST DOCD IN RCRD: CPT | Mod: CPTII,S$GLB,, | Performed by: NURSE PRACTITIONER

## 2024-10-02 PROCEDURE — 99999 PR PBB SHADOW E&M-EST. PATIENT-LVL III: CPT | Mod: PBBFAC,,, | Performed by: NURSE PRACTITIONER

## 2024-10-02 PROCEDURE — 3079F DIAST BP 80-89 MM HG: CPT | Mod: CPTII,S$GLB,, | Performed by: NURSE PRACTITIONER

## 2024-10-02 NOTE — PROGRESS NOTES
[facility]  Department of Surgery      REFERRING PROVIDER:   Timoteo Lund MD  [address]  [address]  [place] [address]    Chief Complaint: New Patient and Breast Lump: After Breast Feeding      Subjective:      Patient ID: Roxanne Harmon is a 34 y.o. female who presents with left breast mass. She reports she had an episode of mastitis with breastfeeding. Completed course of Keflex with improvement. However, left breast lump never fully resolved. She assumed it was a clogged duct so proceeded with massage, pumping,  self expression without resolution. She stopped breastfeeding/pumping 6-7 weeks ago with continued palpable lump. She underwent US on 2024 which revealed suggest lactational changes without fluid collection or concern for abscess. Findings were given BI-RADS 3.     Patient does routinely do self breast exams.  Patient has noted a change on breast exam.  Patient denies nipple discharge. Patient denies to previous breast biopsy. Patient denies a personal history of breast cancer. Patient does have a history of bilateral breast reduction in 2018.     GYN History:  Age of menarche was 15.   Premenopausal   Patient denies hormonal therapy.   Patient is .   Age of first live birth was 34.   Patient did breast feed.    Past Medical History:   Diagnosis Date    Bee sting allergy     Chronic idiopathic urticaria     Thyroid disease      Past Surgical History:   Procedure Laterality Date     SECTION N/A 2024    Procedure:  SECTION;  Surgeon: Sita Schumacher MD;  Location: [facility] L&[facility];  Service: OB/GYN;  Laterality: N/A;    OPEN REDUCTION AND INTERNAL FIXATION (ORIF) OF INJURY OF ANKLE  2022    OPEN REDUCTION AND INTERNAL FIXATION (ORIF) OF INJURY OF WRIST Right 2021    Procedure: ORIF, WRIST scaphoid right;  Surgeon: Akiko Starks MD;  Location: [facility] OR;  Service: Orthopedics;  Laterality: Right;  MAC/Regional    REDUCTION OF  BOTH BREASTS Bilateral 11/01/2018    Procedure: MAMMOPLASTY, REDUCTION, BILATERAL;  Surgeon: Asa Mak MD;  Location: [facility] OR [address] [place] [address];  Service: Plastics;  Laterality: Bilateral;    TOTAL REDUCTION MAMMOPLASTY       Current Outpatient Medications on File Prior to Visit   Medication Sig Dispense Refill    acetaminophen (TYLENOL) 650 MG TbSR Take 1 tablet (650 mg total) by mouth every 8 (eight) hours. 30 tablet 1    ibuprofen (ADVIL,MOTRIN) 800 MG tablet Take 1 tablet (800 mg total) by mouth every 8 (eight) hours. 30 tablet 1    levothyroxine (SYNTHROID) 75 MCG tablet Take 1 tablet (75 mcg total) by mouth before breakfast. 90 tablet 3    oxyCODONE (ROXICODONE) 5 MG immediate release tablet Take 1 tablet (5 mg total) by mouth every 4 (four) hours as needed for Pain. 15 tablet 0    -iron fum-folic acid (PRENATAL 19) 29 mg iron- 1 mg Tab Take by mouth.      docusate sodium (COLACE) 100 MG capsule Take 2 capsules (200 mg total) by mouth 2 (two) times daily. 60 capsule 1    EPINEPHrine (EPIPEN 2-BUCK) 0.3 mg/0.3 mL AtIn Inject 0.3 mLs (0.3 mg total) into the muscle once as needed (anaphylaxis). 2 each 3     No current facility-administered medications on file prior to visit.     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        They have been together since 2015        Engaged to be  date set for 7/2023        No kids        Partner since 10/2015         Social Drivers of Health      Financial Resource Strain: Low Risk  (3/12/2020)    Overall Financial Resource Strain (CARDIA)     Difficulty of Paying Living Expenses: Not hard at all   Food Insecurity: No Food Insecurity (3/12/2020)    Hunger Vital Sign     Worried About Running Out of Food in the Last Year: Never true     Ran Out of Food in the Last Year: Never true   Transportation Needs: No Transportation Needs (3/12/2020)    PRAPARE - Transportation     Lack of Transportation (Medical): No     Lack of Transportation (Non-Medical): No   Physical Activity: Sufficiently Active (3/12/2020)    Exercise Vital Sign     Days of Exercise per Week: 3 days     Minutes of Exercise per Session: 60 min   Stress: No Stress Concern Present (3/12/2020)    [employer] [place] [employer] - Occupational Stress Questionnaire     Feeling of Stress : Not at all     Family History   Problem Relation Name Age of Onset    Hyperlipidemia Mother BRACA neg     Hypothyroidism Mother BRACA neg     Hyperlipidemia Father      Hypertension Father      Skin cancer Father          ?squamous    Ovarian cancer Maternal Grandmother  70    Skin cancer Paternal Grandmother Chelle     Diabetes Paternal Grandmother Chelle     Prostate cancer Paternal Grandfather      Pancreatic cancer Paternal Grandfather  83    Breast cancer Neg Hx      Colon cancer Neg Hx      Stroke Neg Hx      Esophageal cancer Neg Hx      Celiac disease Neg Hx      Cirrhosis Neg Hx      Liver disease Neg Hx      Rectal cancer Neg Hx      Stomach cancer Neg Hx      Liver cancer Neg Hx      Irritable bowel syndrome Neg Hx      Inflammatory bowel disease Neg Hx      Hemochromatosis Neg Hx      Cystic fibrosis Neg Hx      Crohn's disease Neg Hx      Colon polyps Neg Hx      Ulcerative colitis Neg Hx      Lymphoma Neg Hx      Tuberculosis Neg Hx      Des's disease Neg Hx      Scleroderma Neg Hx      Rheum arthritis Neg Hx      Melanoma Neg Hx      Multiple sclerosis Neg Hx      Lupus Neg Hx      Psoriasis  "Neg Hx      Kidney cancer Neg Hx      Bladder Cancer Neg Hx      Uterine cancer Neg Hx          Review of Systems   Constitutional:  Negative for chills, fatigue, fever and unexpected weight change.   Eyes:  Negative for visual disturbance.   Respiratory:  Negative for cough, shortness of breath and wheezing.    Cardiovascular:  Negative for chest pain and palpitations.   Musculoskeletal:  Negative for back pain.   Skin:  Negative for color change, pallor, rash and wound.   Neurological:  Negative for dizziness and headaches.     Objective:   /86   Pulse (!) 111   Ht 5' 7" (1.702 m)   Wt 86.6 kg (191 lb)   LMP 10/07/2023   BMI 29.91 kg/m²     Physical Exam   Vitals reviewed.  Constitutional: She is oriented to person, place, and time.   Eyes: Right eye exhibits no discharge. Left eye exhibits no discharge.   Cardiovascular:  Normal pulses.            Pulmonary/Chest: Effort normal. No respiratory distress. She has no wheezes. Right breast exhibits no inverted nipple, no mass, no nipple discharge, no skin change and no tenderness. Left breast exhibits mass. Left breast exhibits no inverted nipple, no nipple discharge, no skin change and no tenderness.       Abdominal: Normal appearance.   Musculoskeletal: Normal range of motion. Lymphadenopathy:      Cervical: No cervical adenopathy.     Neurological: She is alert and oriented to person, place, and time.   Skin: Skin is warm and dry. No rash noted. No erythema. No pallor.         Radiology review: Images personally reviewed by me in the clinic.     8/29/2024 US Breast Left Limited     History:  Patient is 34 y.o. and is seen for breast mass in female.     Films Compared:  There are no prior studies available for comparison.      Findings:     Targeted sonographic interrogation of the patient's palpable area of concern reveals what appears to be nonvascular, hyperechoic material within a ductal system. The patient is in the process of scaling back " lactation. There are no suspicious findings on this exam. This particular finding is located at the 12 o'clock position of the left breast 3 cm from the nipple and measures roughly 1.5 cm x 1.2 cm x 2.3 cm although the cine images show a larger area affected to approximately the 3.7 cm.         Impression:  Today's findings are suggestive of lactational changes. Three-month ultrasound follow-up is recommended. The patient has been advised to seek additional imaging if there is detrimental change prior to the 3 month follow-up.      BI-RADS Category:   Overall: 3 - Probably Benign        Recommendation:  Short Interval Follow-Up is recommended in 3 months.        Assessment:       1. Breast lump on left side at 12 o'clock position    2. Encounter for screening breast examination        Plan:     Discussed exam findings do not suggest mastitis or residual abscess. Area feels like scar tissue/fat necrosis. Patient does have a history of bilateral breast reduction in 2018 but this finding is new since then. Will proceed with bilateral diagnostic mammogram for further evaluation of this palpable findings.   Can consider breast MRI if unable to correlate finding on mammogram and continued clinical concern       The patient is in agreement with the plan. Questions were encouraged and answered to patient's satisfaction. Roxanne will call our office with any questions or concerns.

## 2024-10-03 ENCOUNTER — HOSPITAL ENCOUNTER (OUTPATIENT)
Dept: RADIOLOGY | Facility: HOSPITAL | Age: 34
Discharge: HOME | End: 2024-10-03
Attending: NURSE PRACTITIONER
Payer: COMMERCIAL

## 2024-10-03 DIAGNOSIS — N63.20 MASS OF LEFT BREAST, UNSPECIFIED QUADRANT: ICD-10-CM

## 2024-10-03 DIAGNOSIS — N63.20 LEFT BREAST MASS: ICD-10-CM

## 2024-10-03 PROCEDURE — 77066 DX MAMMO INCL CAD BI: CPT | Mod: 26,,, | Performed by: RADIOLOGY

## 2024-10-03 PROCEDURE — 76642 ULTRASOUND BREAST LIMITED: CPT | Mod: TC,LT

## 2024-10-03 PROCEDURE — 77062 BREAST TOMOSYNTHESIS BI: CPT | Mod: TC

## 2024-10-03 PROCEDURE — 76642 ULTRASOUND BREAST LIMITED: CPT | Mod: 26,LT,, | Performed by: RADIOLOGY

## 2024-10-03 PROCEDURE — 77062 BREAST TOMOSYNTHESIS BI: CPT | Mod: 26,,, | Performed by: RADIOLOGY

## 2024-10-09 ENCOUNTER — PATIENT MESSAGE (OUTPATIENT)
Dept: OBSTETRICS AND GYNECOLOGY | Facility: CLINIC | Age: 34
End: 2024-10-09
Payer: COMMERCIAL

## 2024-10-15 ENCOUNTER — OFFICE VISIT (OUTPATIENT)
Dept: INTERNAL MEDICINE | Facility: CLINIC | Age: 34
End: 2024-10-15
Payer: COMMERCIAL

## 2024-10-15 ENCOUNTER — LAB VISIT (OUTPATIENT)
Dept: LAB | Facility: OTHER | Age: 34
End: 2024-10-15
Attending: STUDENT IN AN ORGANIZED HEALTH CARE EDUCATION/TRAINING PROGRAM
Payer: COMMERCIAL

## 2024-10-15 VITALS
HEIGHT: 67 IN | DIASTOLIC BLOOD PRESSURE: 82 MMHG | HEART RATE: 77 BPM | SYSTOLIC BLOOD PRESSURE: 102 MMHG | WEIGHT: 196.19 LBS | BODY MASS INDEX: 30.79 KG/M2 | OXYGEN SATURATION: 99 %

## 2024-10-15 DIAGNOSIS — E53.8 LOW SERUM VITAMIN B12: Primary | ICD-10-CM

## 2024-10-15 DIAGNOSIS — Z98.891 S/P PRIMARY LOW TRANSVERSE C-SECTION: ICD-10-CM

## 2024-10-15 DIAGNOSIS — E61.1 IRON DEFICIENCY: ICD-10-CM

## 2024-10-15 DIAGNOSIS — Z00.00 ANNUAL PHYSICAL EXAM: ICD-10-CM

## 2024-10-15 DIAGNOSIS — Z91.030 BEE STING ALLERGY: ICD-10-CM

## 2024-10-15 DIAGNOSIS — Z00.00 ANNUAL PHYSICAL EXAM: Primary | ICD-10-CM

## 2024-10-15 DIAGNOSIS — E03.9 HYPOTHYROIDISM, UNSPECIFIED TYPE: ICD-10-CM

## 2024-10-15 DIAGNOSIS — Z13.6 SCREENING FOR CARDIOVASCULAR CONDITION: ICD-10-CM

## 2024-10-15 PROBLEM — N91.2 AMENORRHEA: Status: RESOLVED | Noted: 2023-11-30 | Resolved: 2024-10-15

## 2024-10-15 PROBLEM — M25.572 ACUTE LEFT ANKLE PAIN: Status: RESOLVED | Noted: 2022-04-12 | Resolved: 2024-10-15

## 2024-10-15 LAB
ALBUMIN SERPL BCP-MCNC: 3.5 G/DL (ref 3.5–5.2)
ALP SERPL-CCNC: 100 U/L (ref 55–135)
ALT SERPL W/O P-5'-P-CCNC: 20 U/L (ref 10–44)
ANION GAP SERPL CALC-SCNC: 7 MMOL/L (ref 8–16)
AST SERPL-CCNC: 18 U/L (ref 10–40)
BASOPHILS # BLD AUTO: 0.05 K/UL (ref 0–0.2)
BASOPHILS NFR BLD: 0.8 % (ref 0–1.9)
BILIRUB SERPL-MCNC: 0.4 MG/DL (ref 0.1–1)
BUN SERPL-MCNC: 10 MG/DL (ref 6–20)
CALCIUM SERPL-MCNC: 9.2 MG/DL (ref 8.7–10.5)
CHLORIDE SERPL-SCNC: 106 MMOL/L (ref 95–110)
CO2 SERPL-SCNC: 25 MMOL/L (ref 23–29)
CREAT SERPL-MCNC: 0.8 MG/DL (ref 0.5–1.4)
DIFFERENTIAL METHOD BLD: ABNORMAL
EOSINOPHIL # BLD AUTO: 0.1 K/UL (ref 0–0.5)
EOSINOPHIL NFR BLD: 2.1 % (ref 0–8)
ERYTHROCYTE [DISTWIDTH] IN BLOOD BY AUTOMATED COUNT: 14.2 % (ref 11.5–14.5)
EST. GFR  (NO RACE VARIABLE): >60 ML/MIN/1.73 M^2
ESTIMATED AVG GLUCOSE: 103 MG/DL (ref 68–131)
FERRITIN SERPL-MCNC: 9 NG/ML (ref 20–300)
FOLATE SERPL-MCNC: 9.6 NG/ML (ref 4–24)
GLUCOSE SERPL-MCNC: 91 MG/DL (ref 70–110)
HBA1C MFR BLD: 5.2 % (ref 4–5.6)
HCT VFR BLD AUTO: 41.1 % (ref 37–48.5)
HGB BLD-MCNC: 12.8 G/DL (ref 12–16)
IMM GRANULOCYTES # BLD AUTO: 0.02 K/UL (ref 0–0.04)
IMM GRANULOCYTES NFR BLD AUTO: 0.3 % (ref 0–0.5)
IRON SERPL-MCNC: 52 UG/DL (ref 30–160)
LYMPHOCYTES # BLD AUTO: 2.2 K/UL (ref 1–4.8)
LYMPHOCYTES NFR BLD: 33.4 % (ref 18–48)
MCH RBC QN AUTO: 26.7 PG (ref 27–31)
MCHC RBC AUTO-ENTMCNC: 31.1 G/DL (ref 32–36)
MCV RBC AUTO: 86 FL (ref 82–98)
MONOCYTES # BLD AUTO: 0.5 K/UL (ref 0.3–1)
MONOCYTES NFR BLD: 7.7 % (ref 4–15)
NEUTROPHILS # BLD AUTO: 3.7 K/UL (ref 1.8–7.7)
NEUTROPHILS NFR BLD: 55.7 % (ref 38–73)
NRBC BLD-RTO: 0 /100 WBC
PLATELET # BLD AUTO: 332 K/UL (ref 150–450)
PMV BLD AUTO: 9.6 FL (ref 9.2–12.9)
POTASSIUM SERPL-SCNC: 3.9 MMOL/L (ref 3.5–5.1)
PROT SERPL-MCNC: 6.9 G/DL (ref 6–8.4)
RBC # BLD AUTO: 4.79 M/UL (ref 4–5.4)
SATURATED IRON: 13 % (ref 20–50)
SODIUM SERPL-SCNC: 138 MMOL/L (ref 136–145)
TOTAL IRON BINDING CAPACITY: 403 UG/DL (ref 250–450)
TRANSFERRIN SERPL-MCNC: 272 MG/DL (ref 200–375)
TSH SERPL DL<=0.005 MIU/L-ACNC: 0.59 UIU/ML (ref 0.4–4)
VIT B12 SERPL-MCNC: 405 PG/ML (ref 210–950)
WBC # BLD AUTO: 6.61 K/UL (ref 3.9–12.7)

## 2024-10-15 PROCEDURE — 83540 ASSAY OF IRON: CPT | Performed by: STUDENT IN AN ORGANIZED HEALTH CARE EDUCATION/TRAINING PROGRAM

## 2024-10-15 PROCEDURE — 1159F MED LIST DOCD IN RCRD: CPT | Mod: CPTII,S$GLB,, | Performed by: STUDENT IN AN ORGANIZED HEALTH CARE EDUCATION/TRAINING PROGRAM

## 2024-10-15 PROCEDURE — 99999 PR PBB SHADOW E&M-EST. PATIENT-LVL III: CPT | Mod: PBBFAC,,, | Performed by: STUDENT IN AN ORGANIZED HEALTH CARE EDUCATION/TRAINING PROGRAM

## 2024-10-15 PROCEDURE — 83036 HEMOGLOBIN GLYCOSYLATED A1C: CPT | Performed by: STUDENT IN AN ORGANIZED HEALTH CARE EDUCATION/TRAINING PROGRAM

## 2024-10-15 PROCEDURE — 82607 VITAMIN B-12: CPT | Performed by: STUDENT IN AN ORGANIZED HEALTH CARE EDUCATION/TRAINING PROGRAM

## 2024-10-15 PROCEDURE — 83695 ASSAY OF LIPOPROTEIN(A): CPT | Performed by: STUDENT IN AN ORGANIZED HEALTH CARE EDUCATION/TRAINING PROGRAM

## 2024-10-15 PROCEDURE — 82728 ASSAY OF FERRITIN: CPT | Performed by: STUDENT IN AN ORGANIZED HEALTH CARE EDUCATION/TRAINING PROGRAM

## 2024-10-15 PROCEDURE — 99395 PREV VISIT EST AGE 18-39: CPT | Mod: S$GLB,,, | Performed by: STUDENT IN AN ORGANIZED HEALTH CARE EDUCATION/TRAINING PROGRAM

## 2024-10-15 PROCEDURE — 85025 COMPLETE CBC W/AUTO DIFF WBC: CPT | Performed by: STUDENT IN AN ORGANIZED HEALTH CARE EDUCATION/TRAINING PROGRAM

## 2024-10-15 PROCEDURE — 80053 COMPREHEN METABOLIC PANEL: CPT | Performed by: STUDENT IN AN ORGANIZED HEALTH CARE EDUCATION/TRAINING PROGRAM

## 2024-10-15 PROCEDURE — 82746 ASSAY OF FOLIC ACID SERUM: CPT | Performed by: STUDENT IN AN ORGANIZED HEALTH CARE EDUCATION/TRAINING PROGRAM

## 2024-10-15 PROCEDURE — 3008F BODY MASS INDEX DOCD: CPT | Mod: CPTII,S$GLB,, | Performed by: STUDENT IN AN ORGANIZED HEALTH CARE EDUCATION/TRAINING PROGRAM

## 2024-10-15 PROCEDURE — 3074F SYST BP LT 130 MM HG: CPT | Mod: CPTII,S$GLB,, | Performed by: STUDENT IN AN ORGANIZED HEALTH CARE EDUCATION/TRAINING PROGRAM

## 2024-10-15 PROCEDURE — 84443 ASSAY THYROID STIM HORMONE: CPT | Performed by: STUDENT IN AN ORGANIZED HEALTH CARE EDUCATION/TRAINING PROGRAM

## 2024-10-15 PROCEDURE — 3079F DIAST BP 80-89 MM HG: CPT | Mod: CPTII,S$GLB,, | Performed by: STUDENT IN AN ORGANIZED HEALTH CARE EDUCATION/TRAINING PROGRAM

## 2024-10-15 RX ORDER — FERROUS GLUCONATE 324(38)MG
324 TABLET ORAL
Qty: 90 TABLET | Refills: 1 | Status: SHIPPED | OUTPATIENT
Start: 2024-10-16

## 2024-10-15 RX ORDER — EPINEPHRINE 0.3 MG/.3ML
1 INJECTION SUBCUTANEOUS ONCE AS NEEDED
Qty: 2 EACH | Refills: 3 | Status: SHIPPED | OUTPATIENT
Start: 2024-10-15 | End: 2024-10-17

## 2024-10-15 NOTE — PROGRESS NOTES
[facility]    Subjective:       Patient ID: Roxanne Harmon is a 34 y.o. female.    Chief Complaint: Annual Exam      History was obtained from the patient and supplemented through chart review.  This patient is known to me.     HPI:    Patient is a 34 y.o. female who presents for annual.    Hypothyroid  Synthroid 75 mcg  Low tsh postpartum, will recheck    Vit D   Taking 5,000 units a day    Taking prenatal and OTC iron, checking    Wt Readings from Last 15 Encounters:   10/15/24 89 kg (196 lb 3.4 oz)   10/02/24 86.6 kg (191 lb)   09/10/24 86.8 kg (191 lb 5.8 oz)   07/16/24 95.6 kg (210 lb 12.2 oz)   07/15/24 95.6 kg (210 lb 12.2 oz)   07/09/24 94.4 kg (208 lb 1.8 oz)   07/03/24 94.9 kg (209 lb 3.5 oz)   06/27/24 93.4 kg (205 lb 14.6 oz)   06/13/24 93 kg (205 lb 0.4 oz)   05/23/24 92 kg (202 lb 13.2 oz)   04/25/24 92.6 kg (204 lb 2.3 oz)   03/28/24 91.4 kg (201 lb 8 oz)   02/08/24 89.1 kg (196 lb 6.9 oz)   01/10/24 88.8 kg (195 lb 12.3 oz)   11/30/23 89.8 kg (197 lb 15.6 oz)        Medical History  Past Medical History:   Diagnosis Date    Bee sting allergy     Chronic idiopathic urticaria     Thyroid disease        Review of Systems   Constitutional:  Negative for activity change and unexpected weight change.   HENT:  Negative for hearing loss, rhinorrhea and trouble swallowing.    Eyes:  Negative for discharge and visual disturbance.   Respiratory:  Negative for chest tightness and wheezing.    Cardiovascular:  Negative for chest pain and palpitations.   Gastrointestinal:  Negative for blood in stool, constipation, diarrhea and vomiting.   Endocrine: Negative for polydipsia and polyuria.   Genitourinary:  Negative for difficulty urinating, dysuria, hematuria and menstrual problem.   Musculoskeletal:  Negative for arthralgias, joint swelling and neck pain.   Neurological:  Negative for weakness and headaches.   Psychiatric/Behavioral:  Negative for confusion and dysphoric mood.       "    Surgical hx, family hx, social hx   Have been reviewed      Current Outpatient Medications:     levothyroxine (SYNTHROID) 75 MCG tablet, Take 1 tablet (75 mcg total) by mouth before breakfast., Disp: 90 tablet, Rfl: 3    acetaminophen (TYLENOL) 650 MG TbSR, Take 1 tablet (650 mg total) by mouth every 8 (eight) hours. (Patient not taking: Reported on 10/15/2024), Disp: 30 tablet, Rfl: 1    docusate sodium (COLACE) 100 MG capsule, Take 2 capsules (200 mg total) by mouth 2 (two) times daily. (Patient not taking: Reported on 10/15/2024), Disp: 60 capsule, Rfl: 1    EPINEPHrine (EPIPEN 2-BUCK) 0.3 mg/0.3 mL AtIn, Inject 0.3 mLs (0.3 mg total) into the muscle once as needed (anaphylaxis)., Disp: 2 each, Rfl: 3    ibuprofen (ADVIL,MOTRIN) 800 MG tablet, Take 1 tablet (800 mg total) by mouth every 8 (eight) hours. (Patient not taking: Reported on 10/15/2024), Disp: 30 tablet, Rfl: 1    oxyCODONE (ROXICODONE) 5 MG immediate release tablet, Take 1 tablet (5 mg total) by mouth every 4 (four) hours as needed for Pain. (Patient not taking: Reported on 10/15/2024), Disp: 15 tablet, Rfl: 0    -iron fum-folic acid (PRENATAL 19) 29 mg iron- 1 mg Tab, Take by mouth. (Patient not taking: Reported on 10/15/2024), Disp: , Rfl:     Objective:        Body mass index is 30.73 kg/m².  Vitals:    10/15/24 0801   BP: 102/82   Pulse: 77   SpO2: 99%   Weight: 89 kg (196 lb 3.4 oz)   Height: 5' 7" (1.702 m)   PainSc: 0-No pain     Physical Exam  Vitals and nursing note reviewed.   Constitutional:       General: She is not in acute distress.     Appearance: Normal appearance. She is not ill-appearing.   HENT:      Head: Normocephalic and atraumatic.   Eyes:      General: No scleral icterus.  Cardiovascular:      Rate and Rhythm: Normal rate and regular rhythm.      Heart sounds: Normal heart sounds.   Pulmonary:      Effort: Pulmonary effort is normal.   Abdominal:      General: There is no distension.   Musculoskeletal:      Cervical " back: Normal range of motion.   Skin:     General: Skin is warm and dry.   Neurological:      Mental Status: She is alert and oriented to person, place, and time.   Psychiatric:         Behavior: Behavior normal.           Lab Results   Component Value Date    WBC 22.11 (H) 2024    HGB 11.8 (L) 2024    HCT 33.6 (L) 2024     2024    CHOL 163 2023    TRIG 60 2023    HDL 68 2023    ALT 21 2023    AST 23 2023     2023    K 3.8 2023     2023    CREATININE 0.7 2023    BUN 8 2023    CO2 23 2023    TSH 0.283 (L) 09/10/2024    INR 1.0 2022    HGBA1C 5.1 2023       The ASCVD Risk score (Bong CORONA, et al., 2019) failed to calculate for the following reasons:    The 2019 ASCVD risk score is only valid for ages 40 to 79    (Imaging have been independently reviewed)    Review mammo    Assessment:         1. Annual physical exam    2. Iron deficiency    3. Screening for cardiovascular condition    4. Hypothyroidism, unspecified type    5. S/P primary low transverse     6. Bee sting allergy              Plan:     Roxanne was seen today for annual exam.    Diagnoses and all orders for this visit:    Annual physical exam  -     Comprehensive Metabolic Panel; Future  -     TSH; Future  -     CBC Auto Differential; Future  -     Hemoglobin A1C; Future    Iron deficiency  -     Ferritin; Future  -     Folate; Future  -     Vitamin B12; Future  -     Iron and TIBC; Future    Screening for cardiovascular condition  -     LIPOPROTEIN A (LPA); Future    Hypothyroidism, unspecified type    S/P primary low transverse     Bee sting allergy  Comments:  discussed medic alert bracelet or necklace and keeping Epi Pen with ehr at all times    Orders:  -     EPINEPHrine (EPIPEN 2-BUCK) 0.3 mg/0.3 mL AtIn; Inject 0.3 mLs (0.3 mg total) into the muscle once as needed (anaphylaxis).            Health Maintenance  -  Lipids:   - A1C:   - Colon Ca Screen: na  - Immunizations: covid vaccinated and boosted    Women's health  - Pap: NILM 5/28/2020, due May 2023   - Mammo: na  - Dexa: na  - Contraception: ocp    Follow up in about 1 year (around 10/15/2025).        All medications were reviewed including potential side effects and risks/benefits.  Pt was counseled to call back if anything worsens or if questions arise.    Timoteo Lund MD  Family Medicine  [facility]  [address] [place] [id number]  Phone [phone]  Fax [phone]

## 2024-10-16 ENCOUNTER — TELEPHONE (OUTPATIENT)
Dept: INTERNAL MEDICINE | Facility: CLINIC | Age: 34
End: 2024-10-16
Payer: COMMERCIAL

## 2024-10-16 NOTE — TELEPHONE ENCOUNTER
----- Message from Timoteo Lund MD sent at 10/15/2024  5:20 PM CDT -----  B12, ferritin, mma 3 months

## 2024-10-16 NOTE — TELEPHONE ENCOUNTER
Called and spoke to Ms. Harmon. She has viewed the message from Dr. Lund. She will get lab drawn around 01/16/25, and will adjust this date if needed.       From Dr. Lund    --B12, ferritin, mma 3 months

## 2024-10-18 LAB — LPA SERPL-MCNC: 32 MG/DL (ref 0–30)

## 2024-11-20 ENCOUNTER — OFFICE VISIT (OUTPATIENT)
Dept: SURGERY | Facility: CLINIC | Age: 34
End: 2024-11-20
Payer: COMMERCIAL

## 2024-11-20 VITALS
SYSTOLIC BLOOD PRESSURE: 112 MMHG | HEART RATE: 75 BPM | HEIGHT: 67 IN | DIASTOLIC BLOOD PRESSURE: 77 MMHG | BODY MASS INDEX: 30.76 KG/M2 | WEIGHT: 196 LBS

## 2024-11-20 DIAGNOSIS — Z09 FOLLOW-UP EXAM: ICD-10-CM

## 2024-11-20 DIAGNOSIS — N63.25 BREAST LUMP ON LEFT SIDE AT 12 O'CLOCK POSITION: Primary | ICD-10-CM

## 2024-11-20 PROCEDURE — 99212 OFFICE O/P EST SF 10 MIN: CPT | Mod: S$GLB,,, | Performed by: NURSE PRACTITIONER

## 2024-11-20 PROCEDURE — 1159F MED LIST DOCD IN RCRD: CPT | Mod: CPTII,S$GLB,, | Performed by: NURSE PRACTITIONER

## 2024-11-20 PROCEDURE — 3078F DIAST BP <80 MM HG: CPT | Mod: CPTII,S$GLB,, | Performed by: NURSE PRACTITIONER

## 2024-11-20 PROCEDURE — 3008F BODY MASS INDEX DOCD: CPT | Mod: CPTII,S$GLB,, | Performed by: NURSE PRACTITIONER

## 2024-11-20 PROCEDURE — 3044F HG A1C LEVEL LT 7.0%: CPT | Mod: CPTII,S$GLB,, | Performed by: NURSE PRACTITIONER

## 2024-11-20 PROCEDURE — 3074F SYST BP LT 130 MM HG: CPT | Mod: CPTII,S$GLB,, | Performed by: NURSE PRACTITIONER

## 2024-11-20 PROCEDURE — 99999 PR PBB SHADOW E&M-EST. PATIENT-LVL III: CPT | Mod: PBBFAC,,, | Performed by: NURSE PRACTITIONER

## 2024-11-20 NOTE — PROGRESS NOTES
[facility]  Department of Surgery      REFERRING PROVIDER:   No referring provider defined for this encounter.    Chief Complaint: Follow-up and Breast Mass      Subjective:      Patient ID: Roxanne Harmon is a 34 y.o. female who presents with left breast mass. She reports she had an episode of mastitis with breastfeeding. Completed course of Keflex with improvement. However, left breast lump never fully resolved. She assumed it was a clogged duct so proceeded with massage, pumping,  self expression without resolution. She stopped breastfeeding/pumping 6-7 weeks ago with continued palpable lump. She underwent US on 2024 which revealed suggest lactational changes without fluid collection or concern for abscess. Findings were given BI-RADS 3.     Patient does routinely do self breast exams.  Patient has noted a change on breast exam.  Patient denies nipple discharge. Patient denies to previous breast biopsy. Patient denies a personal history of breast cancer. Patient does have a history of bilateral breast reduction in 2018.     GYN History:  Age of menarche was 15.   Premenopausal   Patient denies hormonal therapy.   Patient is .   Age of first live birth was 34.   Patient did breast feed.    Interval History:   Patient present for short term follow up. She is doing well. Reports mass is has no increased in size. Does seems to have become smaller. She has stopped breastfeeding.      Past Medical History:   Diagnosis Date    Bee sting allergy     Chronic idiopathic urticaria     Thyroid disease      Past Surgical History:   Procedure Laterality Date     SECTION N/A 2024    Procedure:  SECTION;  Surgeon: Sita Schumacher MD;  Location: [facility];  Service: OB/GYN;  Laterality: N/A;    OPEN REDUCTION AND INTERNAL FIXATION (ORIF) OF INJURY OF ANKLE  2022    OPEN REDUCTION AND INTERNAL FIXATION (ORIF) OF INJURY OF WRIST Right 2021    Procedure: ORIF, WRIST  scaphoid right;  Surgeon: Akiko Starks MD;  Location: [facility];  Service: Orthopedics;  Laterality: Right;  MAC/Regional    REDUCTION OF BOTH BREASTS Bilateral 11/01/2018    Procedure: MAMMOPLASTY, REDUCTION, BILATERAL;  Surgeon: sAa Mak MD;  Location: [address];  Service: Plastics;  Laterality: Bilateral;    TOTAL REDUCTION MAMMOPLASTY       Current Outpatient Medications on File Prior to Visit   Medication Sig Dispense Refill    ferrous gluconate (FERGON) 324 MG tablet Take 1 tablet (324 mg total) by mouth every Mon, Wed, Fri. With food/drink Containing vit c. Limit calcium at the same time to promote absorption. 90 tablet 1    levothyroxine (SYNTHROID) 75 MCG tablet Take 1 tablet (75 mcg total) by mouth before breakfast. 90 tablet 3    -iron fum-folic acid (PRENATAL 19) 29 mg iron- 1 mg Tab Take by mouth.      acetaminophen (TYLENOL) 650 MG TbSR Take 1 tablet (650 mg total) by mouth every 8 (eight) hours. (Patient not taking: Reported on 10/15/2024) 30 tablet 1    docusate sodium (COLACE) 100 MG capsule Take 2 capsules (200 mg total) by mouth 2 (two) times daily. (Patient not taking: Reported on 10/15/2024) 60 capsule 1    EPINEPHrine (EPIPEN 2-BUCK) 0.3 mg/0.3 mL AtIn Inject 0.3 mLs (0.3 mg total) into the muscle once as needed (anaphylaxis). 2 each 3    ibuprofen (ADVIL,MOTRIN) 800 MG tablet Take 1 tablet (800 mg total) by mouth every 8 (eight) hours. (Patient not taking: Reported on 10/15/2024) 30 tablet 1    oxyCODONE (ROXICODONE) 5 MG immediate release tablet Take 1 tablet (5 mg total) by mouth every 4 (four) hours as needed for Pain. (Patient not taking: Reported on 10/15/2024) 15 tablet 0     No current facility-administered medications on file prior to visit.     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        They have been together since 2015        Engaged to be  date set for 7/2023        No kids        Partner since 10/2015         Social Drivers of Health     Financial Resource Strain: Low Risk  (10/14/2024)    Overall Financial Resource Strain (CARDIA)     Difficulty of Paying Living Expenses: Not hard at all   Food Insecurity: No Food Insecurity (10/14/2024)    Hunger Vital Sign     Worried About Running Out of Food in the Last Year: Never true     Ran Out of Food in the Last Year: Never true   Transportation Needs: No Transportation Needs (3/12/2020)    PRAPARE - Transportation     Lack of Transportation (Medical): No     Lack of Transportation (Non-Medical): No   Physical Activity: Sufficiently Active (10/14/2024)    Exercise Vital Sign     Days of Exercise per Week: 4 days     Minutes of Exercise per Session: 40 min   Stress: No Stress Concern Present (10/14/2024)    [employer] - Occupational Stress Questionnaire     Feeling of Stress : Not at all   Housing Stability: Unknown (10/14/2024)    Housing Stability Vital Sign     Unable to Pay for Housing in the Last Year: No     Family History   Problem Relation Name Age of Onset    Hyperlipidemia Mother BRACA neg     Hypothyroidism Mother BRACA neg     Hyperlipidemia Father      Hypertension Father      Skin cancer Father          ?squamous    Ovarian cancer Maternal Grandmother  70    Skin cancer Paternal Grandmother Chelle     Diabetes Paternal Grandmother Chelle     Prostate cancer Paternal Grandfather      Pancreatic cancer Paternal Grandfather  83    Breast cancer Neg Hx    "   Colon cancer Neg Hx      Stroke Neg Hx      Esophageal cancer Neg Hx      Celiac disease Neg Hx      Cirrhosis Neg Hx      Liver disease Neg Hx      Rectal cancer Neg Hx      Stomach cancer Neg Hx      Liver cancer Neg Hx      Irritable bowel syndrome Neg Hx      Inflammatory bowel disease Neg Hx      Hemochromatosis Neg Hx      Cystic fibrosis Neg Hx      Crohn's disease Neg Hx      Colon polyps Neg Hx      Ulcerative colitis Neg Hx      Lymphoma Neg Hx      Tuberculosis Neg Hx      Des's disease Neg Hx      Scleroderma Neg Hx      Rheum arthritis Neg Hx      Melanoma Neg Hx      Multiple sclerosis Neg Hx      Lupus Neg Hx      Psoriasis Neg Hx      Kidney cancer Neg Hx      Bladder Cancer Neg Hx      Uterine cancer Neg Hx          Review of Systems   Constitutional:  Negative for chills, fatigue, fever and unexpected weight change.   Eyes:  Negative for visual disturbance.   Respiratory:  Negative for cough, shortness of breath and wheezing.    Cardiovascular:  Negative for chest pain and palpitations.   Musculoskeletal:  Negative for back pain.   Skin:  Negative for color change, pallor, rash and wound.   Neurological:  Negative for dizziness and headaches.     Objective:   /77   Pulse 75   Ht 5' 7" (1.702 m)   Wt 88.9 kg (196 lb)   BMI 30.70 kg/m²     Physical Exam   Vitals reviewed.  Constitutional: She is oriented to person, place, and time.   Eyes: Right eye exhibits no discharge. Left eye exhibits no discharge.   Cardiovascular:  Normal pulses.            Pulmonary/Chest: Effort normal. No respiratory distress. She has no wheezes. Right breast exhibits no inverted nipple, no mass, no nipple discharge, no skin change and no tenderness. Left breast exhibits mass. Left breast exhibits no inverted nipple, no nipple discharge, no skin change and no tenderness.       Abdominal: Normal appearance.   Musculoskeletal: Normal range of motion. Lymphadenopathy:      Cervical: No cervical adenopathy. "     Neurological: She is alert and oriented to person, place, and time.   Skin: Skin is warm and dry. No rash noted. No erythema. No pallor.         Radiology review: Images personally reviewed by me in the clinic.     8/29/2024 US Breast Left Limited     History:  Patient is 34 y.o. and is seen for breast mass in female.     Films Compared:  There are no prior studies available for comparison.      Findings:     Targeted sonographic interrogation of the patient's palpable area of concern reveals what appears to be nonvascular, hyperechoic material within a ductal system. The patient is in the process of scaling back lactation. There are no suspicious findings on this exam. This particular finding is located at the 12 o'clock position of the left breast 3 cm from the nipple and measures roughly 1.5 cm x 1.2 cm x 2.3 cm although the cine images show a larger area affected to approximately the 3.7 cm.         Impression:  Today's findings are suggestive of lactational changes. Three-month ultrasound follow-up is recommended. The patient has been advised to seek additional imaging if there is detrimental change prior to the 3 month follow-up.      BI-RADS Category:   Overall: 3 - Probably Benign        Recommendation:  Short Interval Follow-Up is recommended in 3 months.        Assessment:       1. Breast lump on left side at 12 o'clock position    2. Follow-up exam          Plan:   Short term follow up today. Doing well. On exam the mass is softer and less prominent compared to previous imaging - consistent with fat necrosis   Will be due for screening mammogram at age 42.   Can follow up as needed for any new or worsening breast concerns     The patient is in agreement with the plan. Questions were encouraged and answered to patient's satisfaction. Roxanne will call our office with any questions or concerns.

## 2025-01-10 ENCOUNTER — PATIENT MESSAGE (OUTPATIENT)
Dept: INTERNAL MEDICINE | Facility: CLINIC | Age: 35
End: 2025-01-10
Payer: COMMERCIAL

## 2025-04-24 ENCOUNTER — PATIENT MESSAGE (OUTPATIENT)
Dept: INTERNAL MEDICINE | Facility: CLINIC | Age: 35
End: 2025-04-24
Payer: COMMERCIAL

## 2025-05-11 ENCOUNTER — PATIENT MESSAGE (OUTPATIENT)
Dept: OPTOMETRY | Facility: CLINIC | Age: 35
End: 2025-05-11
Payer: COMMERCIAL

## 2025-05-13 NOTE — PROGRESS NOTES
Patient ID: Roxanne Harmon is a 34 y.o. White female    Subjective  Chief Complaint: patient presents for medical weight loss management.    Contraindications to GLP-1 receptor agonist therapy:   Denies personal or family history of MTC and personal history of MEN2     Pregnancy Status:   - Pt denies current pregnancy, breastfeeding, or plans to become pregnant.  - Pt denies current use of oral hormonal contraception.     Co-morbidities: none    History of weight loss therapy:  Pt denies previous weight management medication use.     Weight loss history:  Starting weight:    5/13/2025   Recent Readings    Weight (lbs) 192.6 lb    BMI 30.16 BMI      Objective  Lab Results   Component Value Date     10/15/2024     11/30/2023     07/12/2023     Lab Results   Component Value Date    K 3.9 10/15/2024    K 3.8 11/30/2023    K 3.7 07/12/2023     Lab Results   Component Value Date     10/15/2024     11/30/2023     07/12/2023     Lab Results   Component Value Date    CO2 25 10/15/2024    CO2 23 11/30/2023    CO2 24 07/12/2023     Lab Results   Component Value Date    BUN 10 10/15/2024    BUN 8 11/30/2023    BUN 10 07/12/2023     Lab Results   Component Value Date    GLU 91 10/15/2024    GLU 95 11/30/2023    GLU 92 07/12/2023     Lab Results   Component Value Date    CALCIUM 9.2 10/15/2024    CALCIUM 9.8 11/30/2023    CALCIUM 9.6 07/12/2023     Lab Results   Component Value Date    PROT 6.9 10/15/2024    PROT 7.0 07/12/2023    PROT 6.7 11/02/2022     Lab Results   Component Value Date    ALBUMIN 3.5 10/15/2024    ALBUMIN 3.8 07/12/2023    ALBUMIN 3.0 (L) 11/02/2022     Lab Results   Component Value Date    BILITOT 0.4 10/15/2024    BILITOT 0.5 07/12/2023    BILITOT 0.3 11/02/2022     Lab Results   Component Value Date    AST 18 10/15/2024    AST 23 07/12/2023    AST 16 11/02/2022     Lab Results   Component Value Date    ALT 20 10/15/2024    ALT 21 07/12/2023    ALT 18 11/02/2022     Lab  Results   Component Value Date    ANIONGAP 7 (L) 10/15/2024    ANIONGAP 9 11/30/2023    ANIONGAP 10 07/12/2023     Lab Results   Component Value Date    CREATININE 0.8 10/15/2024    CREATININE 0.7 11/30/2023    CREATININE 0.7 07/12/2023     Lab Results   Component Value Date    EGFRNORACEVR >60 10/15/2024    EGFRNORACEVR >60 11/30/2023    EGFRNORACEVR >60 07/12/2023     Assessment/Plan  -Pt qualifies for GLP-1 RA therapy based on BMI greater than or equal to 30 kg/m2  - Initiate Zepbound 2.5 mg SQ weekly x 4 weeks  - Then increase to Zepbound 5 mg SQ weekly  - RTC in 3 months for follow-up evaluation    Patient consented to pharmacist management via collaborative practice.

## 2025-05-14 ENCOUNTER — PATIENT MESSAGE (OUTPATIENT)
Dept: INTERNAL MEDICINE | Facility: CLINIC | Age: 35
End: 2025-05-14

## 2025-05-14 ENCOUNTER — OFFICE VISIT (OUTPATIENT)
Dept: INTERNAL MEDICINE | Facility: CLINIC | Age: 35
End: 2025-05-14
Payer: COMMERCIAL

## 2025-05-14 DIAGNOSIS — E66.811 OBESITY, CLASS I, BMI 30-34.9: Primary | ICD-10-CM

## 2025-05-14 RX ORDER — TIRZEPATIDE 5 MG/.5ML
5 INJECTION, SOLUTION SUBCUTANEOUS
Qty: 2 ML | Refills: 1 | Status: ACTIVE | OUTPATIENT
Start: 2025-05-14

## 2025-05-14 RX ORDER — TIRZEPATIDE 2.5 MG/.5ML
2.5 INJECTION, SOLUTION SUBCUTANEOUS
Qty: 2 ML | Refills: 0 | Status: ACTIVE | OUTPATIENT
Start: 2025-05-14

## 2025-05-16 PROBLEM — E66.811 OBESITY, CLASS I, BMI 30-34.9: Status: ACTIVE | Noted: 2025-05-16

## 2025-05-19 ENCOUNTER — OFFICE VISIT (OUTPATIENT)
Dept: OPTOMETRY | Facility: CLINIC | Age: 35
End: 2025-05-19
Payer: COMMERCIAL

## 2025-05-19 DIAGNOSIS — H52.13 MYOPIA WITH ASTIGMATISM, BILATERAL: Primary | ICD-10-CM

## 2025-05-19 DIAGNOSIS — Z46.0 FITTING AND ADJUSTMENT OF SPECTACLES AND CONTACT LENSES: Primary | ICD-10-CM

## 2025-05-19 DIAGNOSIS — H52.203 MYOPIA WITH ASTIGMATISM, BILATERAL: Primary | ICD-10-CM

## 2025-05-19 PROCEDURE — 92015 DETERMINE REFRACTIVE STATE: CPT | Mod: S$GLB,,, | Performed by: OPTOMETRIST

## 2025-05-19 PROCEDURE — 92310 CONTACT LENS FITTING OU: CPT | Mod: CSM,,, | Performed by: OPTOMETRIST

## 2025-05-19 PROCEDURE — 99999 PR PBB SHADOW E&M-EST. PATIENT-LVL II: CPT | Mod: PBBFAC,,, | Performed by: OPTOMETRIST

## 2025-05-19 PROCEDURE — 92014 COMPRE OPH EXAM EST PT 1/>: CPT | Mod: S$GLB,,, | Performed by: OPTOMETRIST

## 2025-05-19 NOTE — PROGRESS NOTES
OMEGA    LEXIS: 03/24  Chief complaint (CC): Patient is here for annual eye exam today.  Patient   hasn't noticed any vision changes since the last exam.  Glasses and   contacts still seem fine.  Glasses? +  Contacts? +  H/o eye surgery, injections or laser: -  H/o eye injury: -  Known eye conditions? See above  Family h/o eye conditions? -  Eye gtts? -      (-) Flashes (-)  Floaters (-) Mucous   (-)  Tearing (-) Itching (-) Burning   (-) Headaches (-) Eye Pain/discomfort (-) Irritation   (-)  Redness (-) Double vision (-) Blurry vision    Diabetic? -  A1c? -      Last edited by Flores Robles on 5/19/2025  3:44 PM.            Assessment /Plan     For exam results, see Encounter Report.    Myopia with astigmatism, bilateral      CLRx and SRx released to patient. Patient educated on lens options. Normal ocular health. RTC 1 year for routine exam.         Pt's son is 10 mo (Jonel)

## 2025-06-09 ENCOUNTER — PATIENT MESSAGE (OUTPATIENT)
Dept: ADMINISTRATIVE | Facility: OTHER | Age: 35
End: 2025-06-09
Payer: COMMERCIAL

## 2025-06-18 ENCOUNTER — PATIENT MESSAGE (OUTPATIENT)
Dept: OPTOMETRY | Facility: CLINIC | Age: 35
End: 2025-06-18
Payer: COMMERCIAL

## 2025-07-02 ENCOUNTER — OFFICE VISIT (OUTPATIENT)
Dept: SPORTS MEDICINE | Facility: CLINIC | Age: 35
End: 2025-07-02
Payer: COMMERCIAL

## 2025-07-02 ENCOUNTER — PATIENT MESSAGE (OUTPATIENT)
Dept: ADMINISTRATIVE | Facility: OTHER | Age: 35
End: 2025-07-02
Payer: COMMERCIAL

## 2025-07-02 ENCOUNTER — HOSPITAL ENCOUNTER (OUTPATIENT)
Dept: RADIOLOGY | Facility: HOSPITAL | Age: 35
Discharge: HOME | End: 2025-07-02
Attending: SURGERY
Payer: COMMERCIAL

## 2025-07-02 VITALS
WEIGHT: 180.75 LBS | DIASTOLIC BLOOD PRESSURE: 83 MMHG | HEIGHT: 67 IN | SYSTOLIC BLOOD PRESSURE: 115 MMHG | HEART RATE: 77 BPM | BODY MASS INDEX: 28.37 KG/M2

## 2025-07-02 DIAGNOSIS — M79.662 PAIN IN LEFT SHIN: ICD-10-CM

## 2025-07-02 DIAGNOSIS — Z98.890 STATUS POST OPEN REDUCTION WITH INTERNAL FIXATION (ORIF) OF FRACTURE OF ANKLE: ICD-10-CM

## 2025-07-02 DIAGNOSIS — M79.662 PAIN IN LEFT SHIN: Primary | ICD-10-CM

## 2025-07-02 DIAGNOSIS — Z87.81 STATUS POST OPEN REDUCTION WITH INTERNAL FIXATION (ORIF) OF FRACTURE OF ANKLE: ICD-10-CM

## 2025-07-02 PROCEDURE — 73590 X-RAY EXAM OF LOWER LEG: CPT | Mod: 26,50,, | Performed by: RADIOLOGY

## 2025-07-02 PROCEDURE — 99999 PR PBB SHADOW E&M-EST. PATIENT-LVL III: CPT | Mod: PBBFAC,,, | Performed by: SURGERY

## 2025-07-02 PROCEDURE — 73590 X-RAY EXAM OF LOWER LEG: CPT | Mod: TC,50

## 2025-07-02 RX ADMIN — TRIAMCINOLONE ACETONIDE 40 MG: 40 INJECTION, SUSPENSION INTRA-ARTICULAR; INTRAMUSCULAR at 01:07

## 2025-07-03 RX ORDER — TRIAMCINOLONE ACETONIDE 40 MG/ML
40 INJECTION, SUSPENSION INTRA-ARTICULAR; INTRAMUSCULAR
Status: DISCONTINUED | OUTPATIENT
Start: 2025-07-02 | End: 2025-07-03 | Stop reason: HOSPADM

## 2025-07-03 NOTE — PROGRESS NOTES
"SUBJECTIVE:    Ms. Harmon is here today for a follow up visit.  She works as a nurse practitioner.  She has a history of an open trimalleolar ankle fracture which underwent surgery at [facility].  She has recovered well, however she still endorses pain to this area.  She was referred to us by Dr. Wharton.          OBJECTIVE:      Vitals:    07/02/25 1353   BP: 115/83   Pulse: 77   Weight: 82 kg (180 lb 12.4 oz)   Height: 5' 7" (1.702 m)   PainSc:   2       Lower Extremity Exam  Well healed surgical incisions, some tenderness to palpation and pain with range of motion about the ankle, however primarily her pain is at the site of the previous open injury.  She lacks any deformity she has excellent overall motion with roughly 60° of plantar flexion dorsiflexion in appropriate inversion eversion.  She is neurovascularly grossly intact.     DIAGNOSTIC STUDIES:  X-rays of the left tibia and fibula demonstrate she is status post open ORIF of trimalleolar ankle fracture.  Hardware appears in place in appropriate.  He has some joint space narrowing about the ankle however I would describe her arthritis as kl 2.    ASSESSMENT:   1. Left ankle posttraumatic arthritis      PLAN:  Roxanne was seen today for pain.    Diagnoses and all orders for this visit:    Pain in left shin  -     XR Tibia Fibula 2 View Bilateral; Future    Status post open reduction with internal fixation (ORIF) of fracture of ankle  -     CT Ankle (Including Hindfoot) Without Contrast Left; Future        CT ordered to assess for healing and better characterize deformity about the ankle.  Injection performed of the left ankle.  I explained that this is both diagnostic and therapeutic.  We will see if this improves her pain and if so she maybe a candidate for ankle procedures.  If not, she may be better served by hydrodissection and other nonsurgical management of her scar and soft tissues above the ankle in the tibia.  Follow up after CT.  Ian Elliott, MD [facility] " [facility]  Orthopedic Surgery      This note was done with voice recognition software. Please excuse any errors missed in proof reading.

## 2025-07-03 NOTE — PROCEDURES
Intermediate Joint Aspiration/Injection: L ankle    Date/Time: 7/2/2025 1:45 PM    Performed by: Sandor Cam MD  Authorized by: Sandor Cam MD    Consent Done?:  Yes (Verbal)  Indications:  Arthritis    Location:  Ankle    Local anesthesia used?: Yes    Anesthesia:  Local infiltration  Local anesthetic:  Lidocaine 1% without epinephrine  Site:  L ankle  Needle size:  22 G  Approach:  Anteromedial  Medications:  40 mg triamcinolone acetonide 40 mg/mL  Patient tolerance:  Patient tolerated the procedure well with no immediate complications

## 2025-07-09 ENCOUNTER — HOSPITAL ENCOUNTER (OUTPATIENT)
Dept: RADIOLOGY | Facility: HOSPITAL | Age: 35
Discharge: HOME | End: 2025-07-09
Attending: SURGERY
Payer: COMMERCIAL

## 2025-07-09 DIAGNOSIS — Z87.81 STATUS POST OPEN REDUCTION WITH INTERNAL FIXATION (ORIF) OF FRACTURE OF ANKLE: ICD-10-CM

## 2025-07-09 DIAGNOSIS — Z98.890 STATUS POST OPEN REDUCTION WITH INTERNAL FIXATION (ORIF) OF FRACTURE OF ANKLE: ICD-10-CM

## 2025-07-09 PROCEDURE — 73700 CT LOWER EXTREMITY W/O DYE: CPT | Mod: TC,LT

## 2025-07-30 DIAGNOSIS — E66.811 OBESITY, CLASS I, BMI 30-34.9: ICD-10-CM

## 2025-07-30 RX ORDER — TIRZEPATIDE 5 MG/.5ML
5 INJECTION, SOLUTION SUBCUTANEOUS
Qty: 2 ML | Refills: 0 | Status: ACTIVE | OUTPATIENT
Start: 2025-07-30

## 2025-08-13 ENCOUNTER — OFFICE VISIT (OUTPATIENT)
Dept: INTERNAL MEDICINE | Facility: CLINIC | Age: 35
End: 2025-08-13
Payer: COMMERCIAL

## 2025-08-13 ENCOUNTER — PATIENT MESSAGE (OUTPATIENT)
Dept: INTERNAL MEDICINE | Facility: CLINIC | Age: 35
End: 2025-08-13

## 2025-08-13 DIAGNOSIS — E66.3 OVERWEIGHT WITH BODY MASS INDEX (BMI) OF 26 TO 26.9 IN ADULT: Primary | ICD-10-CM

## 2025-08-13 PROCEDURE — 99499 UNLISTED E&M SERVICE: CPT | Mod: 95,,,

## 2025-08-13 RX ORDER — TIRZEPATIDE 5 MG/.5ML
5 INJECTION, SOLUTION SUBCUTANEOUS
Qty: 2 ML | Refills: 2 | Status: ACTIVE | OUTPATIENT
Start: 2025-08-13

## 2025-08-18 ENCOUNTER — OFFICE VISIT (OUTPATIENT)
Dept: DERMATOLOGY | Facility: CLINIC | Age: 35
End: 2025-08-18
Payer: COMMERCIAL

## 2025-08-18 DIAGNOSIS — D22.9 MULTIPLE BENIGN NEVI: ICD-10-CM

## 2025-08-18 DIAGNOSIS — Z12.83 SCREENING EXAM FOR SKIN CANCER: Primary | ICD-10-CM

## 2025-08-18 DIAGNOSIS — L81.4 LENTIGO: ICD-10-CM

## 2025-08-18 DIAGNOSIS — D22.39 FIBROUS PAPULE OF NOSE: ICD-10-CM

## 2025-08-18 PROCEDURE — 99999 PR PBB SHADOW E&M-EST. PATIENT-LVL II: CPT | Mod: PBBFAC,,, | Performed by: DERMATOLOGY

## 2025-08-18 PROCEDURE — 1159F MED LIST DOCD IN RCRD: CPT | Mod: CPTII,S$GLB,, | Performed by: DERMATOLOGY

## 2025-08-18 PROCEDURE — 1160F RVW MEDS BY RX/DR IN RCRD: CPT | Mod: CPTII,S$GLB,, | Performed by: DERMATOLOGY

## 2025-08-18 PROCEDURE — 99213 OFFICE O/P EST LOW 20 MIN: CPT | Mod: S$GLB,,, | Performed by: DERMATOLOGY

## (undated) DEVICE — BIT DRILL ACUTRAK II MINI

## (undated) DEVICE — STAPLER SKIN ROTATING HEAD

## (undated) DEVICE — PAD ABD 8X10 STERILE

## (undated) DEVICE — TRAY FOLEY 16FR INFECTION CONT

## (undated) DEVICE — GLOVE BIOGEL SKINSENSE PI 7.0

## (undated) DEVICE — SPONGE DERMACEA GAUZE 4X4

## (undated) DEVICE — EVACUATOR WOUND BULB 100CC

## (undated) DEVICE — SUT MCRYL PLUS 4-0 PS2 27IN

## (undated) DEVICE — BOVIE SUCTION

## (undated) DEVICE — Device

## (undated) DEVICE — BLADE SURG #15 CARBON STEEL

## (undated) DEVICE — NDL SPINAL SPINOCAN 22GX3.5

## (undated) DEVICE — DRESSING N ADH OIL EMUL 3X8

## (undated) DEVICE — TOURNIQUET SB QC DP 18X4IN

## (undated) DEVICE — SYS PRINEO SKIN CLOSURE

## (undated) DEVICE — SPLINT PLASTER EXT FAST 4X15

## (undated) DEVICE — BANDAGE KERLIX P/P 2.25IN STER

## (undated) DEVICE — BRA STYLE 7 SIZE 44

## (undated) DEVICE — BLADE SURG CARBON STEEL #10

## (undated) DEVICE — PAD CAST SPECIALIST STRL 4

## (undated) DEVICE — SEE MEDLINE ITEM 152622

## (undated) DEVICE — SEE MEDLINE ITEM 152512

## (undated) DEVICE — SUT MONOCRYL 3-0 PS-2 UND

## (undated) DEVICE — SEE MEDLINE ITEM 152515

## (undated) DEVICE — TRAY MINOR GEN SURG

## (undated) DEVICE — SKINMARKER & RULER REGULAR X-F

## (undated) DEVICE — SUT 0 VICRYL / CT-1

## (undated) DEVICE — SEE MEDLINE ITEM 157131

## (undated) DEVICE — K-WIRE SNGL TRCR .045 D 6L N/S
Type: IMPLANTABLE DEVICE | Site: HAND | Status: NON-FUNCTIONAL
Removed: 2021-03-22

## (undated) DEVICE — MANIFOLD 4 PORT

## (undated) DEVICE — SLING ARM SMALL FOAM STRAP

## (undated) DEVICE — DRESSING XEROFORM FOIL PK 1X8

## (undated) DEVICE — SUT VICRYL PLUS 4-0 P3 18IN

## (undated) DEVICE — SPONGE LAP 18X18 PREWASHED

## (undated) DEVICE — DRAIN CHANNEL ROUND 15FR

## (undated) DEVICE — SYR 30CC LUER LOCK

## (undated) DEVICE — ADHESIVE DERMABOND ADVANCED

## (undated) DEVICE — GOWN AERO CHROME W/ TOWEL XL

## (undated) DEVICE — BIT DRILL BONE MINI 18MM LONG

## (undated) DEVICE — ELECTRODE REM PLYHSV RETURN 9

## (undated) DEVICE — SEE MEDLINE ITEM 157128

## (undated) DEVICE — SUT SILK 2-0 PS 18IN BLACK

## (undated) DEVICE — PACK UNIVERSAL SPLIT II

## (undated) DEVICE — SEE MEDLINE ITEM 146417

## (undated) DEVICE — GOWN SURGICAL X-LARGE

## (undated) DEVICE — GAUZE SPONGE 4X4 12PLY

## (undated) DEVICE — SUT CTD VICRYL CT-1 UND BR